# Patient Record
Sex: FEMALE | Race: WHITE | NOT HISPANIC OR LATINO | Employment: FULL TIME | URBAN - METROPOLITAN AREA
[De-identification: names, ages, dates, MRNs, and addresses within clinical notes are randomized per-mention and may not be internally consistent; named-entity substitution may affect disease eponyms.]

---

## 2017-01-03 ENCOUNTER — MYC MEDICAL ADVICE (OUTPATIENT)
Dept: ONCOLOGY | Facility: CLINIC | Age: 42
End: 2017-01-03

## 2017-01-05 DIAGNOSIS — Z01.818 PRE-OPERATIVE EXAMINATION: ICD-10-CM

## 2017-01-05 DIAGNOSIS — Z15.01 BRCA2 GENETIC CARRIER: Primary | ICD-10-CM

## 2017-01-05 DIAGNOSIS — Z15.09 BRCA2 GENETIC CARRIER: Primary | ICD-10-CM

## 2017-01-05 DIAGNOSIS — N39.3 FEMALE STRESS INCONTINENCE: Primary | ICD-10-CM

## 2017-01-05 RX ORDER — CEFAZOLIN SODIUM 1 G/50ML
2 SOLUTION INTRAVENOUS
Status: CANCELLED | OUTPATIENT
Start: 2017-01-05

## 2017-01-05 RX ORDER — PHENAZOPYRIDINE HYDROCHLORIDE 200 MG/1
200 TABLET, FILM COATED ORAL ONCE
Status: CANCELLED | OUTPATIENT
Start: 2017-01-05 | End: 2017-01-05

## 2017-01-05 RX ORDER — CEFAZOLIN SODIUM 1 G/3ML
1 INJECTION, POWDER, FOR SOLUTION INTRAMUSCULAR; INTRAVENOUS SEE ADMIN INSTRUCTIONS
Status: CANCELLED | OUTPATIENT
Start: 2017-01-05

## 2017-01-13 DIAGNOSIS — Z15.01 BRCA POSITIVE: Primary | ICD-10-CM

## 2017-01-13 DIAGNOSIS — Z15.09 BRCA POSITIVE: Primary | ICD-10-CM

## 2017-01-13 RX ORDER — PHENAZOPYRIDINE HYDROCHLORIDE 200 MG/1
200 TABLET, FILM COATED ORAL ONCE
Status: CANCELLED | OUTPATIENT
Start: 2017-01-13 | End: 2017-01-13

## 2017-02-28 RX ORDER — PHENAZOPYRIDINE HYDROCHLORIDE 200 MG/1
200 TABLET, FILM COATED ORAL ONCE
Status: CANCELLED | OUTPATIENT
Start: 2017-02-28 | End: 2017-02-28

## 2017-03-09 DIAGNOSIS — Z12.39 BREAST CANCER SCREENING, HIGH RISK PATIENT: Primary | ICD-10-CM

## 2017-03-09 DIAGNOSIS — Z15.01 BRCA2 POSITIVE: ICD-10-CM

## 2017-03-09 DIAGNOSIS — Z80.0 FAMILY HISTORY OF PANCREATIC CANCER: ICD-10-CM

## 2017-03-09 DIAGNOSIS — Z80.41 FAMILY HISTORY OF MALIGNANT NEOPLASM OF OVARY: ICD-10-CM

## 2017-03-09 DIAGNOSIS — Z80.3 FAMILY HISTORY OF MALIGNANT NEOPLASM OF BREAST: ICD-10-CM

## 2017-03-09 DIAGNOSIS — Z15.09 BRCA2 POSITIVE: ICD-10-CM

## 2017-03-10 ENCOUNTER — ONCOLOGY VISIT (OUTPATIENT)
Dept: ONCOLOGY | Facility: CLINIC | Age: 42
End: 2017-03-10
Attending: CLINICAL NURSE SPECIALIST
Payer: COMMERCIAL

## 2017-03-10 VITALS
OXYGEN SATURATION: 99 % | HEIGHT: 63 IN | DIASTOLIC BLOOD PRESSURE: 82 MMHG | SYSTOLIC BLOOD PRESSURE: 122 MMHG | TEMPERATURE: 98.6 F | WEIGHT: 136.9 LBS | BODY MASS INDEX: 24.26 KG/M2 | HEART RATE: 70 BPM

## 2017-03-10 DIAGNOSIS — Z15.01 BRCA2 POSITIVE: ICD-10-CM

## 2017-03-10 DIAGNOSIS — Z15.09 BRCA2 POSITIVE: ICD-10-CM

## 2017-03-10 DIAGNOSIS — Z15.01 BRCA2 POSITIVE: Primary | ICD-10-CM

## 2017-03-10 DIAGNOSIS — Z80.3 FAMILY HISTORY OF MALIGNANT NEOPLASM OF BREAST: ICD-10-CM

## 2017-03-10 DIAGNOSIS — N83.201 CYST OF RIGHT OVARY: ICD-10-CM

## 2017-03-10 DIAGNOSIS — Z15.09 BRCA2 POSITIVE: Primary | ICD-10-CM

## 2017-03-10 DIAGNOSIS — Z80.41 FAMILY HISTORY OF MALIGNANT NEOPLASM OF OVARY: ICD-10-CM

## 2017-03-10 LAB — CANCER AG125 SERPL-ACNC: 9 U/ML (ref 0–30)

## 2017-03-10 PROCEDURE — 86304 IMMUNOASSAY TUMOR CA 125: CPT | Performed by: CLINICAL NURSE SPECIALIST

## 2017-03-10 PROCEDURE — 36415 COLL VENOUS BLD VENIPUNCTURE: CPT | Performed by: CLINICAL NURSE SPECIALIST

## 2017-03-10 PROCEDURE — 99212 OFFICE O/P EST SF 10 MIN: CPT | Mod: ZF

## 2017-03-10 PROCEDURE — 99214 OFFICE O/P EST MOD 30 MIN: CPT | Mod: ZP | Performed by: CLINICAL NURSE SPECIALIST

## 2017-03-10 NOTE — NURSING NOTE
"Debbie Baker is a 41 year old female who presents for:  Chief Complaint   Patient presents with     Oncology Clinic Visit     BRCA2 Positive        Initial Vitals:  /82  Pulse 70  Temp 98.6  F (37  C) (Oral)  Ht 1.6 m (5' 3\")  Wt 62.1 kg (136 lb 14.4 oz)  SpO2 99%  BMI 24.25 kg/m2 Estimated body mass index is 24.25 kg/(m^2) as calculated from the following:    Height as of this encounter: 1.6 m (5' 3\").    Weight as of this encounter: 62.1 kg (136 lb 14.4 oz).. Body surface area is 1.66 meters squared. BP completed using cuff size: regular  Data Unavailable No LMP recorded. Allergies and medications reviewed.     Medications: Medication refills not needed today.  Pharmacy name entered into [employer]: [id number] - [place], [place] - [address] [place] [address] RD [address] AT [facility] [address]    Comments:     7 minutes for nursing intake (face to face time)   Candie Joy CMA        "

## 2017-03-10 NOTE — LETTER
Cancer Risk Management  Program Locations    [facility]  [facility]  [facility]  [facility]  [facility]  Mailing Address  Cancer Risk Management Program  [facility]  [address]  [facility] [address]  [place] [address]    New patient appointments  [phone]  March 10, 2017    Debbie Baker  [address]  [facility] [address]      Dear Debbie,    It was a pleasure meeting with you today.  Below is a copy of my note from our visit, outlining your surveillance plan.      I look forward to seeing you in the future to coordinate your care and reduce your health risks. Please feel free to contact me if you have any questions or concerns.        Oncology Risk Management Consultation:  Date on this visit: 3/10/2017    Debbie Baker returns to the Cancer Risk Management Program today for high risk  screening and surveillance to minimize her risk of cancer secondary to having a deleterious BRCA2 mutation.  She is considered to be at high risk for hereditary breast and ovarian cancer.    Primary Physician: Aspen Renteria     History Of Present Illness:  Ms. Baker is a 41 year old female who presents with family history of breast and ovarian cancer.    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      Pertinent history:  Menarche at age 11  First child at age 30  6-8 month history of breast feeding  Currently using the Mirena IUD (inserted in 2014)  LMP: unknown  Reports a 15-20 year history of oral contraceptive use  Her only past history with breast problems was an episode of mastitis when she was breastfeeding her children.     Breast screening history:  11/17/2014, Screening, mammogram, BiRads1.   3/11/2016 - Screening mammogram, BiRads1  9/9/2016 - Breast MRI, BiRads1.  No history  of breast biopsy    Ovarian screening history:  3/11/2016 - Pelvic US normal; :10 (normal)  2016 - Pelvic US normal; : 8 (normal)  3/10/2017 - R ovarian cyst - see details below;  pending     At this visit, she states that she feels well and denies nipple discharge, lumps, masses, skin and nipple changes and changes in symmetry. She denies fatigue, constipation, diarrhea, urinary frequency and urgency, abdominal bloating and early satiety    Past Medical/Surgical History:  Past Medical History   Diagnosis Date     At high risk for breast cancer 2016     BRCA2+     At risk for cancer 2016     at risk for ovarian/fallopian tube cancer, BRCA2+     BRCA2 positive 2016     [id number]; single site analysis through [facility]     Mastitis in female      No past surgical history on file.    Allergies:  Allergies as of 03/10/2017 - Avinash as Reviewed 03/10/2017   Allergen Reaction Noted     Dust mites Other (See Comments) 2016     No known drug allergies  2016       Current Medications:  Current Outpatient Prescriptions   Medication Sig Dispense Refill     Multiple Vitamin (M.V.I. ADULT IV) Take 1 tablet by mouth       tretinoin (RETIN-A) 0.05 % cream        zolpidem (AMBIEN) 5 MG tablet Take 5 mg by mouth       buPROPion (WELLBUTRIN) 100 MG tablet        fluticasone (FLONASE) 50 MCG/ACT nasal spray        levonorgestrel (MIRENA) 20 MCG/24HR IUD        Cholecalciferol (VITAMIN D) 2000 UNITS tablet        ibuprofen (ADVIL,MOTRIN) 800 MG tablet Reported on 3/10/2017          Family History:  Family History   Problem Relation Age of Onset     Hereditary Breast and Ovarian Cancer Syndrome Mother      BRCA2+ (same genetic mutation), no cancer     Ovarian Cancer Maternal Grandmother 59      at 61     Pancreatic Cancer Maternal Grandfather 87      at 87     Skin Cancer Paternal Grandmother 88     Bladder Cancer Paternal Grandfather 60      at 80     Hereditary  "Breast and Ovarian Cancer Syndrome Maternal Uncle      BRCA2+, no cancer     Breast Cancer Maternal Aunt 51     BRCA2+     Hereditary Breast and Ovarian Cancer Syndrome Cousin      BRCA2+       Social History:  Social History     Social History     Marital status:      Spouse name: Tavares     Number of children: 2     Years of education: N/A     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[employer]     Social History Main Topics     Smoking status: Never Smoker     Smokeless tobacco: Not on file     Alcohol use 0.0 oz/week      Comment: 1 drink/week     Drug use: No     Sexual activity: Yes     Partners: Male     Birth control/ protection: IUD      Comment: Mirena     Other Topics Concern     Caffeine Concern No     Occupational Exposure No     Hobby Hazards No     Sleep Concern No     Stress Concern Yes     Weight Concern No     Special Diet No     Back Care No     Exercise Yes     works out with , does yoga 3-4x/week     Seat Belt Yes     Self-Exams Yes     Parent/Sibling W/ Cabg, Mi Or Angioplasty Before 65f 55m? No     Social History Narrative       Physical Exam:  /82  Pulse 70  Temp 98.6  F (37  C) (Oral)  Ht 1.6 m (5' 3\")  Wt 62.1 kg (136 lb 14.4 oz)  SpO2 99%  BMI 24.25 kg/m2    GENERAL APPEARANCE: healthy, alert and no distress     HENT: Mouth without ulcers or lesions     NECK: no adenopathy, no asymmetry or masses     LYMPHATICS: No cervical, supraclavicular or axillary lymphadenopathy     RESP: lungs clear to auscultation - no rales, rhonchi or wheezes     CARDIOVASCULAR: regular rate and rhythm, normal S1 S2, no S3 or S4 and no murmur.   BREAST: A multi positional, bilateral breast exam was performed.  Fairly symmetrical -Rss>L. Right breast: no palpable dominant masses, no nipple discharge, no skin changes. Dense tissue.  Right axilla: no palpable adenopathy. Left breast: no palpable dominant masses, no nipple discharge, no skin changes. Left axilla: no palpable " "adenopathy. Dense tissue.        SKIN: no suspicious lesions or rashes    Laboratory/Imaging Studies  Results for orders placed or performed in visit on 03/10/17   US Pelvic Complete with Transvaginal    Narrative    EXAMINATION: Pelvic ultrasound, 3/10/2017 10:26 AM     COMPARISON: Ultrasound 9/9/2016    HISTORY: BRCA2 positive; high-risk for ovarian cancer    TECHNIQUE: The pelvis was scanned in standard fashion with  transabdominal and transvaginal transducer(s) using both grey scale  and color Doppler techniques.    FINDINGS:  The uterus measures 8.7 x 4.9 x 3.9 cm, and there is no evidence of a  focal fibroid.  The endometrium is within normal limits and measures 3  mm. Appropriately positioned IUD. There is no free fluid in the  pelvis.    The right ovary measures 3.1 x 3.5 x 2.2 cm and the left ovary  measures 3.2 x 3.1 x 1.7 cm. In the right ovary, there is a 1.7 x 1.7  x 1.1 cm cyst with heterogeneous avascular internal debris and  peripheral vascularity (aka \"ring of fire\"). There is no adnexal mass.  There is normal blood flow to the ovaries.        Impression    IMPRESSION:   1. Mildly complex cyst in the right ovary, appearance favors corpus  luteum cyst. Otherwise normal pelvic ultrasound.  Recommend short term  follow up ultrasound in 6-8 weeks to assess for stability or  resolution.  2. Appropriately positioned IUD.    I have personally reviewed the examination and initial interpretation  and I agree with the findings.    CARO IGLESIAS MD       ASSESSMENT  We reviewed results of Debbie's pelvis US today, which  revealed a mildly complex cyst of the R ovary, likely a corpus luteum cyst (see above).  I am ordering a follow up transvaginal ultrasound for her in 6 weeks.    At this visit, we also discussed the timing of her prophylactic salpingo oophorectomy.  NCCN makes note that BRCA2 genetic carriers may be able to wait until about age 45 to have a prophylactic oophorectomy.  We discussed that " often family history is taken into consideration when making these decisions.  At this point, Debbie says she has more concerns about the long term quality of life issues that may present if she has an oophorectomy earlier in life (cardiac issues, osteoporosis, dementia, etc.)  She says she feels comfortable waiting a bit longer to have an oophorectomy and intends to do so when she is closer to 44.    Her health practices are very good - her BMI is 24.2; she exercises regularly, manages her stress and her weight and says she feels very well. I encourage her to keep up her health promotion practices.     We discussed signs and symptoms to be watchful for in between screening visits. She verbalized understanding of signs and symptoms to address with her physicians such as unusual discharge, vaginal bleeding, unresolved bloating, pain, tenderness, early satiety and lumps, thickening, swelling, tenderness, nipple discharge or changes in skin of breasts.    INDIVIDUALIZED CANCER RISK MANAGEMENT PLAN:  Individualized Surveillance Plan for women  Hereditary Breast and/or Ovarian Cancer Syndrome   Per NCCN Guidelines Version 2.2017   Recommended screening Test or procedure Last done Next Scheduled    Breast self awareness- starting at age 18. Women should be familiar with their breasts and promptly report changes to their care provider. Periodic, consistent self exam may facilitate breast self awareness.    3/10/2017   Sept. 2017   Breast screening, starting at age 25 Clinical breast exams every 6 -12 months 3/10/2017 Sept. 2017   Breast screening   Age 25-29 Annual breast MRI screening with contrast (preferred) or mammogram if MRI is unavailable or individualized based on family history if a breast cancer diagnosis before age 30 is present.       Breast MRI is performed preferably on day 7-15 of menstrual cycle for premenopausal women.   NA   NA   Breast screening   Age >30-75 years     Annual mammogram and   annual breast  MRI, preferably on day 7-15 of menstrual cycle for premenopausal women.    Age>75 years, management should be considered on an individual basis. 3/11/2016 - [facility] mammogram, BiRads1    9/9/2016 - MRI, BiRads1 NEXT: [facility] Mammogram 3/10/2017 at 1 p.m.    NEXT MRI: 9/10 or later following exam   Ovarian cancer screening, starting at age 30-35 Consider transvaginal ultrasound and  tests. 8/9/2016 - Pelvic US, normal    8/9/2016 -  Pelvic US and   3/10/2017, then Sept. 2017   Recommendation: risk-reducing salpingo-oophorectomy, typically between 35 and 40 years old and  upon completion of child bearing.     Because ovarian cancer onset in patients with BRCA2 mutations is on average of 8-10 years later than in patients with BRCA1 mutations, it is reasonable to delay risk-reducing salpingo-oophorectomy until 40-45 years in patients with BRCA2 mutations who have already maximized their breast cancer prevention (i.e., undergone bilateral mastectomy).    Salpingectomy alone is not the standard of care for risk reduction although clinical trials are ongoing.     Discuss option of risk-reducing mastectomy.    Consider risks and benefits of risk reducing agents, such as tamoxifen and raloxifene for breast and ovarian cancer.    Consider a full body skin and eye exam for melanoma screening for both BRCA1+ and BRCA2+.     NOTE: Women with BRCA mutation who are treated for breast cancer should have screening of the remaining breast tissue with annual mammography and breast MRI.    The International Cancer of the Pancreas Screening (CAPS) Consortium recommends that individuals with a BRCA2 mutation who have at least one first-degree relative with pancreatic cancer should undergo initial screening with endoscopic ultrasonography and/or MRI/magnetic resonance cholangiopancreatography.       I will follow up on her screenings and see her in the Cancer Risk Management clinic in six months.    I spent 25 minutes with the  patient with greater that 50% of it in counseling and coordinating care as documented above.    Meliza Najera, HENRIQUE-CNS, OCN, ANG-BC  Clinical Nurse Specialist  Cancer Risk Management Program  [address]  [place] [address]    phone:  [phone]  Pager: [phone]  fax: [phone]    Cc:  Aspen Renteria MD

## 2017-03-10 NOTE — PROGRESS NOTES
Oncology Risk Management Consultation:  Date on this visit: 3/10/2017    Debbie Baker returns to the Cancer Risk Management Program today for high risk  screening and surveillance to minimize her risk of cancer secondary to having a deleterious BRCA2 mutation.  She is considered to be at high risk for hereditary breast and ovarian cancer.    Primary Physician: Aspen Renteria     History Of Present Illness:  Ms. Baker is a 41 year old female who presents with family history of breast and ovarian cancer.    Genetic testing:  BRCA2 mutation, specifically [id number], found on single site analysis genetic testing through [facility] on 1/12/16.      Pertinent history:  Menarche at age 11  First child at age 30  6-8 month history of breast feeding  Currently using the Mirena IUD (inserted in 2014)  LMP: unknown  Reports a 15-20 year history of oral contraceptive use  Her only past history with breast problems was an episode of mastitis when she was breastfeeding her children.     Breast screening history:  11/17/2014, Screening, mammogram, BiRads1.   3/11/2016 - Screening mammogram, BiRads1  9/9/2016 - Breast MRI, BiRads1.  No history of breast biopsy    Ovarian screening history:  3/11/2016 - Pelvic US normal; :10 (normal)  9/9/2016 - Pelvic US normal; : 8 (normal)  3/10/2017 - R ovarian cyst - see details below;  pending     At this visit, she states that she feels well and denies nipple discharge, lumps, masses, skin and nipple changes and changes in symmetry. She denies fatigue, constipation, diarrhea, urinary frequency and urgency, abdominal bloating and early satiety    Past Medical/Surgical History:  Past Medical History   Diagnosis Date     At high risk for breast cancer 01/12/2016     BRCA2+     At risk for cancer 01/12/2016     at risk for ovarian/fallopian tube cancer, BRCA2+     BRCA2 positive 01/12/2016     [id number]; single site analysis through [facility]     Mastitis in  female      No past surgical history on file.    Allergies:  Allergies as of 03/10/2017 - Avinash as Reviewed 03/10/2017   Allergen Reaction Noted     Dust mites Other (See Comments) 2016     No known drug allergies  2016       Current Medications:  Current Outpatient Prescriptions   Medication Sig Dispense Refill     Multiple Vitamin (M.V.I. ADULT IV) Take 1 tablet by mouth       tretinoin (RETIN-A) 0.05 % cream        zolpidem (AMBIEN) 5 MG tablet Take 5 mg by mouth       buPROPion (WELLBUTRIN) 100 MG tablet        fluticasone (FLONASE) 50 MCG/ACT nasal spray        levonorgestrel (MIRENA) 20 MCG/24HR IUD        Cholecalciferol (VITAMIN D) 2000 UNITS tablet        ibuprofen (ADVIL,MOTRIN) 800 MG tablet Reported on 3/10/2017          Family History:  Family History   Problem Relation Age of Onset     Hereditary Breast and Ovarian Cancer Syndrome Mother      BRCA2+ (same genetic mutation), no cancer     Ovarian Cancer Maternal Grandmother 59      at 61     Pancreatic Cancer Maternal Grandfather 87      at 87     Skin Cancer Paternal Grandmother 88     Bladder Cancer Paternal Grandfather 60      at 80     Hereditary Breast and Ovarian Cancer Syndrome Maternal Uncle      BRCA2+, no cancer     Breast Cancer Maternal Aunt 51     BRCA2+     Hereditary Breast and Ovarian Cancer Syndrome Cousin      BRCA2+       Social History:  Social History     Social History     Marital status:      Spouse name: Tavares     Number of children: 2     Years of education: N/A     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[employer]     Social History Main Topics     Smoking status: Never Smoker     Smokeless tobacco: Not on file     Alcohol use 0.0 oz/week      Comment: 1 drink/week     Drug use: No     Sexual activity: Yes     Partners: Male     Birth control/ protection: IUD      Comment: Mirena     Other Topics Concern     Caffeine Concern No     Occupational Exposure No     Hobby Hazards No      "Sleep Concern No     Stress Concern Yes     Weight Concern No     Special Diet No     Back Care No     Exercise Yes     works out with , does yoga 3-4x/week     Seat Belt Yes     Self-Exams Yes     Parent/Sibling W/ Cabg, Mi Or Angioplasty Before 65f 55m? No     Social History Narrative       Physical Exam:  /82  Pulse 70  Temp 98.6  F (37  C) (Oral)  Ht 1.6 m (5' 3\")  Wt 62.1 kg (136 lb 14.4 oz)  SpO2 99%  BMI 24.25 kg/m2    GENERAL APPEARANCE: healthy, alert and no distress     HENT: Mouth without ulcers or lesions     NECK: no adenopathy, no asymmetry or masses     LYMPHATICS: No cervical, supraclavicular or axillary lymphadenopathy     RESP: lungs clear to auscultation - no rales, rhonchi or wheezes     CARDIOVASCULAR: regular rate and rhythm, normal S1 S2, no S3 or S4 and no murmur.   BREAST: A multi positional, bilateral breast exam was performed.  Fairly symmetrical -Rss>L. Right breast: no palpable dominant masses, no nipple discharge, no skin changes. Dense tissue.  Right axilla: no palpable adenopathy. Left breast: no palpable dominant masses, no nipple discharge, no skin changes. Left axilla: no palpable adenopathy. Dense tissue.        SKIN: no suspicious lesions or rashes    Laboratory/Imaging Studies  Results for orders placed or performed in visit on 03/10/17   US Pelvic Complete with Transvaginal    Narrative    EXAMINATION: Pelvic ultrasound, 3/10/2017 10:26 AM     COMPARISON: Ultrasound 9/9/2016    HISTORY: BRCA2 positive; high-risk for ovarian cancer    TECHNIQUE: The pelvis was scanned in standard fashion with  transabdominal and transvaginal transducer(s) using both grey scale  and color Doppler techniques.    FINDINGS:  The uterus measures 8.7 x 4.9 x 3.9 cm, and there is no evidence of a  focal fibroid.  The endometrium is within normal limits and measures 3  mm. Appropriately positioned IUD. There is no free fluid in the  pelvis.    The right ovary measures 3.1 x 3.5 x " "2.2 cm and the left ovary  measures 3.2 x 3.1 x 1.7 cm. In the right ovary, there is a 1.7 x 1.7  x 1.1 cm cyst with heterogeneous avascular internal debris and  peripheral vascularity (aka \"ring of fire\"). There is no adnexal mass.  There is normal blood flow to the ovaries.        Impression    IMPRESSION:   1. Mildly complex cyst in the right ovary, appearance favors corpus  luteum cyst. Otherwise normal pelvic ultrasound.  Recommend short term  follow up ultrasound in 6-8 weeks to assess for stability or  resolution.  2. Appropriately positioned IUD.    I have personally reviewed the examination and initial interpretation  and I agree with the findings.    CARO IGLESIAS MD       ASSESSMENT  We reviewed results of Debbie's pelvis US today, which  revealed a mildly complex cyst of the R ovary, likely a corpus luteum cyst (see above).  I am ordering a follow up transvaginal ultrasound for her in 6 weeks.    At this visit, we also discussed the timing of her prophylactic salpingo oophorectomy.  NCCN makes note that BRCA2 genetic carriers may be able to wait until about age 45 to have a prophylactic oophorectomy.  We discussed that often family history is taken into consideration when making these decisions.  At this point, Debbie says she has more concerns about the long term quality of life issues that may present if she has an oophorectomy earlier in life (cardiac issues, osteoporosis, dementia, etc.)  She says she feels comfortable waiting a bit longer to have an oophorectomy and intends to do so when she is closer to 44.    Her health practices are very good - her BMI is 24.2; she exercises regularly, manages her stress and her weight and says she feels very well. I encourage her to keep up her health promotion practices.     We discussed signs and symptoms to be watchful for in between screening visits. She verbalized understanding of signs and symptoms to address with her physicians such as unusual discharge, " vaginal bleeding, unresolved bloating, pain, tenderness, early satiety and lumps, thickening, swelling, tenderness, nipple discharge or changes in skin of breasts.    INDIVIDUALIZED CANCER RISK MANAGEMENT PLAN:  Individualized Surveillance Plan for women  Hereditary Breast and/or Ovarian Cancer Syndrome   Per NCCN Guidelines Version 2.2017   Recommended screening Test or procedure Last done Next Scheduled    Breast self awareness- starting at age 18. Women should be familiar with their breasts and promptly report changes to their care provider. Periodic, consistent self exam may facilitate breast self awareness.    3/10/2017   Sept. 2017   Breast screening, starting at age 25 Clinical breast exams every 6 -12 months 3/10/2017 Sept. 2017   Breast screening   Age 25-29 Annual breast MRI screening with contrast (preferred) or mammogram if MRI is unavailable or individualized based on family history if a breast cancer diagnosis before age 30 is present.       Breast MRI is performed preferably on day 7-15 of menstrual cycle for premenopausal women.   NA   NA   Breast screening   Age >30-75 years     Annual mammogram and   annual breast MRI, preferably on day 7-15 of menstrual cycle for premenopausal women.    Age>75 years, management should be considered on an individual basis. 3/11/2016 - [facility] mammogram, BiRads1    9/9/2016 - MRI, BiRads1 NEXT: [facility] Mammogram 3/10/2017 at 1 p.m.    NEXT MRI: 9/10 or later following exam   Ovarian cancer screening, starting at age 30-35 Consider transvaginal ultrasound and  tests. 8/9/2016 - Pelvic US, normal    8/9/2016 -  Pelvic US and   3/10/2017, then Sept. 2017   Recommendation: risk-reducing salpingo-oophorectomy, typically between 35 and 40 years old and  upon completion of child bearing.     Because ovarian cancer onset in patients with BRCA2 mutations is on average of 8-10 years later than in patients with BRCA1 mutations, it is reasonable to delay risk-reducing  salpingo-oophorectomy until 40-45 years in patients with BRCA2 mutations who have already maximized their breast cancer prevention (i.e., undergone bilateral mastectomy).    Salpingectomy alone is not the standard of care for risk reduction although clinical trials are ongoing.     Discuss option of risk-reducing mastectomy.    Consider risks and benefits of risk reducing agents, such as tamoxifen and raloxifene for breast and ovarian cancer.    Consider a full body skin and eye exam for melanoma screening for both BRCA1+ and BRCA2+.     NOTE: Women with BRCA mutation who are treated for breast cancer should have screening of the remaining breast tissue with annual mammography and breast MRI.    The International Cancer of the Pancreas Screening (CAPS) Consortium recommends that individuals with a BRCA2 mutation who have at least one first-degree relative with pancreatic cancer should undergo initial screening with endoscopic ultrasonography and/or MRI/magnetic resonance cholangiopancreatography.       I will follow up on her screenings and see her in the Cancer Risk Management clinic in six months.    I spent 25 minutes with the patient with greater that 50% of it in counseling and coordinating care as documented above.    Meliza Najera, HENRIQUE-CNS, OCN, ANG-BC  Clinical Nurse Specialist  Cancer Risk Management Program  [address]  [place] [address]    phone:  [phone]  Pager: [phone]  fax: [phone]    Cc:  Aspen Renteria MD

## 2017-03-10 NOTE — MR AVS SNAPSHOT
After Visit Summary   3/10/2017    Debbie Baker    MRN: [id number]           Patient Information     Date Of Birth          1975        Visit Information        Provider Department      3/10/2017 11:00 AM Meliza Najera APRN [facility]        Today's Diagnoses     BRCA2 positive    -  1    Family history of malignant neoplasm of breast        Cyst of right ovary          Care Instructions    Individualized Surveillance Plan for women  Hereditary Breast and/or Ovarian Cancer Syndrome   Per NCCN Guidelines Version 2.2017   Recommended screening Test or procedure Last done Next Scheduled    Breast self awareness- starting at age 18. Women should be familiar with their breasts and promptly report changes to their care provider. Periodic, consistent self exam may facilitate breast self awareness.    3/10/2017   Sept. 2017   Breast screening, starting at age 25 Clinical breast exams every 6 -12 months 3/10/2017 Sept. 2017   Breast screening   Age 25-29 Annual breast MRI screening with contrast (preferred) or mammogram if MRI is unavailable or individualized based on family history if a breast cancer diagnosis before age 30 is present.       Breast MRI is performed preferably on day 7-15 of menstrual cycle for premenopausal women.   NA   NA   Breast screening   Age >30-75 years     Annual mammogram and   annual breast MRI, preferably on day 7-15 of menstrual cycle for premenopausal women.    Age>75 years, management should be considered on an individual basis. 3/11/2016 - [facility] mammogram, BiRads1    9/9/2016 - MRI, BiRads1 NEXT: [facility] Mammogram 3/10/2017 at 1 p.m.    NEXT MRI: 9/10 or later following exam   Ovarian cancer screening, starting at age 30-35 Consider transvaginal ultrasound and  tests. 8/9/2016 - Pelvic US, normal    8/9/2016 -  Pelvic US and   3/10/2017, then Sept. 2017   Recommendation: risk-reducing salpingo-oophorectomy, typically between 35  and 40 years old and  upon completion of child bearing.     Because ovarian cancer onset in patients with BRCA2 mutations is on average of 8-10 years later than in patients with BRCA1 mutations, it is reasonable to delay risk-reducing salpingo-oophorectomy until 40-45 years in patients with BRCA2 mutations who have already maximized their breast cancer prevention (i.e., undergone bilateral mastectomy).    Salpingectomy alone is not the standard of care for risk reduction although clinical trials are ongoing.     Discuss option of risk-reducing mastectomy.    Consider risks and benefits of risk reducing agents, such as tamoxifen and raloxifene for breast and ovarian cancer.    Consider a full body skin and eye exam for melanoma screening for both BRCA1+ and BRCA2+.     NOTE: Women with BRCA mutation who are treated for breast cancer should have screening of the remaining breast tissue with annual mammography and breast MRI.    The International Cancer of the Pancreas Screening (CAPS) Consortium recommends that individuals with a BRCA2 mutation who have at least one first-degree relative with pancreatic cancer should undergo initial screening with endoscopic ultrasonography and/or MRI/magnetic resonance cholangiopancreatography.               Follow-ups after your visit        Follow-up notes from your care team     Return in about 6 months (around 9/10/2017) for Physical Exam.      Future tests that were ordered for you today     Open Future Orders        Priority Expected Expires Ordered    US Pelvic Complete with Transvaginal Routine 4/14/2017 3/10/2018 3/10/2017    MR Breast Bilateral w Contrast Routine 9/10/2017 3/10/2018 3/9/2017    US Transvaginal Non OB Routine 9/1/2017 3/10/2018 3/9/2017     Routine 9/1/2017 3/9/2018 3/9/2017            Who to contact     If you have questions or need follow up information about today's clinic visit or your schedule please contact [facility] directly  "at [phone].  Normal or non-critical lab and imaging results will be communicated to you by MyChart, letter or phone within 4 business days after the clinic has received the results. If you do not hear from us within 7 days, please contact the clinic through [employer] or phone. If you have a critical or abnormal lab result, we will notify you by phone as soon as possible.  Submit refill requests through [employer] or call your pharmacy and they will forward the refill request to us. Please allow 3 business days for your refill to be completed.          Additional Information About Your Visit        [employer] Information     [employer] gives you secure access to your electronic health record. If you see a primary care provider, you can also send messages to your care team and make appointments. If you have questions, please call your primary care clinic.  If you do not have a primary care provider, please call [phone] and they will assist you.        Care EveryWhere ID     This is your Care EveryWhere ID. This could be used by other organizations to access your [facility] medical records  [id number]        Your Vitals Were     Pulse Temperature Height Pulse Oximetry BMI (Body Mass Index)       70 98.6  F (37  C) (Oral) 1.6 m (5' 3\") 99% 24.25 kg/m2        Blood Pressure from Last 3 Encounters:   03/10/17 122/82   11/16/16 (!) 132/93   09/29/16 106/58    Weight from Last 3 Encounters:   03/10/17 62.1 kg (136 lb 14.4 oz)   11/16/16 63.5 kg (140 lb)   09/29/16 63.4 kg (139 lb 11.2 oz)               Primary Care Provider Office Phone # Fax #    Aspen Renteria [phone]       [facility] [address] [place] [address]        Thank you!     Thank you for choosing [facility]  for your care. Our goal is always to provide you with excellent care. Hearing back from our patients is one way we can continue to improve our services. Please take a few minutes to complete the " written survey that you may receive in the mail after your visit with us. Thank you!             Your Updated Medication List - Protect others around you: Learn how to safely use, store and throw away your medicines at www.disposemymeds.org.          This list is accurate as of: 3/10/17  2:09 PM.  Always use your most recent med list.                   Brand Name Dispense Instructions for use    buPROPion 100 MG tablet    WELLBUTRIN         fluticasone 50 MCG/ACT spray    FLONASE         ibuprofen 800 MG tablet    ADVIL/MOTRIN     Reported on 3/10/2017       levonorgestrel 20 MCG/24HR IUD    MIRENA         M.V.I. ADULT IV      Take 1 tablet by mouth       tretinoin 0.05 % cream    RETIN-A         vitamin D 2000 UNITS tablet          zolpidem 5 MG tablet    AMBIEN     Take 5 mg by mouth

## 2017-07-22 ENCOUNTER — HEALTH MAINTENANCE LETTER (OUTPATIENT)
Age: 42
End: 2017-07-22

## 2017-09-15 DIAGNOSIS — Z80.41 FAMILY HISTORY OF MALIGNANT NEOPLASM OF OVARY: ICD-10-CM

## 2017-09-15 DIAGNOSIS — Z15.01 BRCA2 POSITIVE: ICD-10-CM

## 2017-09-15 DIAGNOSIS — Z15.09 BRCA2 POSITIVE: ICD-10-CM

## 2017-09-15 DIAGNOSIS — R31.29 MICROHEMATURIA: ICD-10-CM

## 2017-09-15 LAB — CANCER AG125 SERPL-ACNC: 8 U/ML (ref 0–30)

## 2017-09-29 ENCOUNTER — ONCOLOGY VISIT (OUTPATIENT)
Dept: ONCOLOGY | Facility: CLINIC | Age: 42
End: 2017-09-29
Attending: CLINICAL NURSE SPECIALIST
Payer: COMMERCIAL

## 2017-09-29 VITALS
WEIGHT: 137.8 LBS | RESPIRATION RATE: 16 BRPM | DIASTOLIC BLOOD PRESSURE: 80 MMHG | TEMPERATURE: 99.3 F | SYSTOLIC BLOOD PRESSURE: 119 MMHG | HEIGHT: 63 IN | BODY MASS INDEX: 24.41 KG/M2 | HEART RATE: 71 BPM | OXYGEN SATURATION: 96 %

## 2017-09-29 DIAGNOSIS — N83.209 OVARIAN CYST: Primary | ICD-10-CM

## 2017-09-29 DIAGNOSIS — Z15.09 BRCA2 POSITIVE: ICD-10-CM

## 2017-09-29 DIAGNOSIS — Z91.89 AT RISK FOR CANCER: ICD-10-CM

## 2017-09-29 DIAGNOSIS — Z15.01 BRCA2 POSITIVE: ICD-10-CM

## 2017-09-29 DIAGNOSIS — Z80.41 FAMILY HISTORY OF MALIGNANT NEOPLASM OF OVARY: ICD-10-CM

## 2017-09-29 DIAGNOSIS — Z12.39 BREAST CANCER SCREENING, HIGH RISK PATIENT: ICD-10-CM

## 2017-09-29 DIAGNOSIS — Z12.39 ENCOUNTER FOR BREAST CANCER SCREENING OTHER THAN MAMMOGRAM: ICD-10-CM

## 2017-09-29 DIAGNOSIS — Z80.3 FAMILY HISTORY OF MALIGNANT NEOPLASM OF BREAST: ICD-10-CM

## 2017-09-29 PROCEDURE — 99214 OFFICE O/P EST MOD 30 MIN: CPT | Mod: ZP | Performed by: CLINICAL NURSE SPECIALIST

## 2017-09-29 PROCEDURE — 99212 OFFICE O/P EST SF 10 MIN: CPT | Mod: ZF

## 2017-09-29 ASSESSMENT — PAIN SCALES - GENERAL: PAINLEVEL: NO PAIN (0)

## 2017-09-29 NOTE — LETTER
Cancer Risk Management  Program Locations    [facility]  [facility]  [facility]  Mailing Address  Cancer Risk Management Program  [facility]  [address]  [facility] [address]  [place] [address]    New patient appointments  [phone]  2017    Debbie Baker  [address]  [facility] [address]      Dear Debbie,    It was good seeing you doing so well  today.  Below is a copy of my note from our visit, outlining your surveillance plan.  I look forward to following up with you on your screenings.  Please feel free to contact me if you have any questions or concerns.      Oncology Risk Management Consultation:  Date on this visit: 2017    Debbie Baker returns to the [facility] for a follow up oncology risk management consultation. She requires screening and surveillance to minimize her risk of cancer secondary to having a deleterious BRCA2 mutation.  She is considered to be at high risk for hereditary breast and ovarian cancer.    Primary Physician: Aspen Renteria MD    History Of Present Illness:  Ms. Baker is a very pleasant, healthy 42 year old female who presents with family history of breast and ovarian cancer     Genetic testin2016 - POSITIVE for a BRCA2 mutation, specifically [id number], found on single site analysis genetic testing through [employer]. The testing was done because of a known genetic mutation in the family.    Pertinent history:  Menarche at age 11  First child at age 30  Hx of breastfeeding x 6-8 month  Currently using the Mirena IUD (inserted in )  LMP: unknown  Reports a 15-20 year history of oral contraceptive use  Her only past history with breast problems was an episode of mastitis when she was breastfeeding her children  No history of breast biopsy, dysplasia, hyperplasia  or malignancy.    Screening history:  11/17/2014, Screening, mammogram, BiRads1.   3/11/2016 - Screening mammogram, BiRads1  9/9/2016 - Breast MRI, BiRads1.  3/10/2017 - Screening tomosynthesis mammogram, BiRads1.  9/15/2017 - Breast MRI, BiRads1.    Ovarian screening history:  3/11/2016 - Pelvic US normal; :10 (normal)  9/9/2016 - Pelvic US normal; : 8 (normal)  3/10/2017 - R ovarian cyst   4/13/2017 - Pelvic US, R hemorrhagic cyst resolved.   9/15/2017 - Pelvic US, Simple cyst on L ovary,  Measuring up to 4 cm.    At this visit, she states she feels well and denies fatigue, pain, nipple discharge, lumps masses, skin and nipple changes and changes in symmetry. She denies constipation, diarrhea, urinary symptoms, abdominal bloating and early satiety.    Past Medical/Surgical History:  Past Medical History:   Diagnosis Date     At high risk for breast cancer 01/12/2016    BRCA2+     At risk for cancer 01/12/2016    at risk for ovarian/fallopian tube cancer, BRCA2+     BRCA2 positive 01/12/2016    [id number]; single site analysis through [employer]     Mastitis in female 2010     No past surgical history on file.    Allergies:  Allergies as of 09/29/2017 - Avinash as Reviewed 03/10/2017   Allergen Reaction Noted     Dust mites Other (See Comments) 03/04/2016     No known drug allergies  09/09/2016       Current Medications:  Current Outpatient Prescriptions   Medication Sig Dispense Refill     ibuprofen (ADVIL,MOTRIN) 800 MG tablet Reported on 3/10/2017       Multiple Vitamin (M.V.I. ADULT IV) Take 1 tablet by mouth       tretinoin (RETIN-A) 0.05 % cream        zolpidem (AMBIEN) 5 MG tablet Take 5 mg by mouth       buPROPion (WELLBUTRIN) 100 MG tablet        fluticasone (FLONASE) 50 MCG/ACT nasal spray        levonorgestrel (MIRENA) 20 MCG/24HR IUD        Cholecalciferol (VITAMIN D) 2000 UNITS tablet           Family History:  Family History   Problem Relation Age of Onset     Hereditary Breast and  Ovarian Cancer Syndrome Mother      BRCA2+ (same genetic mutation), no cancer     Ovarian Cancer Maternal Grandmother 59      at 61     Pancreatic Cancer Maternal Grandfather 87      at 87     Skin Cancer Paternal Grandmother 88     Bladder Cancer Paternal Grandfather 60      at 80     Hereditary Breast and Ovarian Cancer Syndrome Maternal Uncle      BRCA2+, no cancer     Breast Cancer Maternal Aunt 51     BRCA2+     Hereditary Breast and Ovarian Cancer Syndrome Cousin      BRCA2+       Social History:  Social History     Social History     Marital status:      Spouse name: Tavares     Number of children: 2     Years of education: N/A     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[employer]     Social History Main Topics     Smoking status: Never Smoker     Smokeless tobacco: Not on file     Alcohol use 0.0 oz/week      Comment: 1 drink/week     Drug use: No     Sexual activity: Yes     Partners: Male     Birth control/ protection: IUD      Comment: Mirena     Other Topics Concern     Caffeine Concern No     Occupational Exposure No     Hobby Hazards No     Sleep Concern No     Stress Concern Yes     Weight Concern No     Special Diet No     Back Care No     Exercise Yes     works out with , does yoga 3-4x/week     Seat Belt Yes     Self-Exams Yes     Parent/Sibling W/ Cabg, Mi Or Angioplasty Before 65f 55m? No     Social History Narrative       Physical Exam:  There were no vitals taken for this visit.  Physical exam deferred.    Laboratory/Imaging Studies  Results for orders placed or performed in visit on 09/15/17   MR Breast Bilateral w Contrast    Narrative    Exam: Breast MRI, with and without Contrast, and with color encoding     History/Indication: High risk screening. BRCA2 mutation.    Comparison: 2016    Technique: Precontrast gradient recall axial nonfat sat T1.Precontrast  gradient recall axial fat-sat T1. Precontrast STIR axial fat  sat.Postcontrast  gradient recall axial fat-sat T1 with dynamic  postcontrast acquisitions at 2, 4 and 6 minutes with subtractions.  Delayed high-resolution gradient recall sagittal fat-sat T1. MIP of  subtractions, axial coronal and sagittal. Color encoding of post  contrast dynamic acquisitions. IV contrast: 6mL Gadavist    Breast composition: Scattered fibroglandular tissue    Background parenchymal enhancement 1st post C image: Minimal    Findings: No suspicious enhancement in either breast. No  lymphadenopathy.      Impression    Impression: BI-RADS CATEGORY: 1 -  NEGATIVE.    Recommendation: Annual breast cancer screening.    I have personally reviewed the examination and initial interpretation  and I agree with the findings.    FLORI LUA MD       ASSESSMENT  Because of the larger cyst on her L ovary, I am ordering Debbie a follow up US in mid to late October to reimage.    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  She is managing her stress well and continues to exercise, not smoke, maintain a healthy weight and limit alcohol.     We revisited the estimations of her risk, according to Gloria et al 2007, her risk for breast cancer is about 8% right now and may rise to 25.79% by 59, ultimately at 49.86% by 77 years old. It does decrease with a RRSO, but some estimates show only a 10-20% reduction, while others estimate it to be up to 50%. She recognizes that risk estimations do not take into account health promotion practices or individual phenotypes correlated with specific genetic mutations within BRCA2. At this point, she is planning to review her risk in 2 years and consider when to have an RRSO.      INDIVIDUALIZED CANCER RISK MANAGEMENT PLAN:  Individualized Surveillance Plan for women  Hereditary Breast and/or Ovarian Cancer Syndrome   Per NCCN Guidelines Version 2.2017   Recommended screening Test or procedure Last done Next Scheduled     Breast self awareness- starting at age 18. Women should be familiar with their breasts and promptly report changes to their care provider. Periodic, consistent self exam may facilitate breast self awareness.    9/29/2017 March 2018   Breast screening, starting at age 25 Clinical breast exams every 6 -12 months 3/10/ 2017 March 2018   Breast screening   Age 25-29 Annual breast MRI screening with contrast (preferred) or mammogram if MRI is unavailable or individualized based on family history if a breast cancer diagnosis before age 30 is present.       Breast MRI is performed preferably on day 7-15 of menstrual cycle for premenopausal women.     NA     NA   Breast screening   Age >30-75 years     Annual mammogram and   annual breast MRI, preferably on day 7-15 of menstrual cycle for premenopausal women.    Age>75 years, management should be considered on an individual basis. 3/10/2017 - Screening 3D mammogram, BiRads1    9/15/2017 - Breast MRI, BiRads1 NEXT: Exam in March followed by tomosynthesis mammogram    Next MRI: Sept. 2018   Ovarian cancer screening, starting at age 30-35 Consider transvaginal ultrasound and  tests. 9/15/2017 - Pelvic US, simple L ovarian cyst, 4 cm    9/15/2017 -  (8) normal Next: Follow up pelvic US in mid to late Oct.     Next lab: March 2018   Recommendation: risk-reducing salpingo-oophorectomy, typically between 35 and 40 years old and  upon completion of child bearing.     Because ovarian cancer onset in patients with BRCA2 mutations is on average of 8-10 years later than in patients with BRCA1 mutations, it is reasonable to delay risk-reducing salpingo-oophorectomy until 40-45 years in patients with BRCA2 mutations who have already maximized their breast cancer prevention (i.e., undergone bilateral mastectomy).    Salpingectomy alone is not the standard of care for risk reduction although clinical trials are ongoing.     Discuss option of risk-reducing  mastectomy.    Consider risks and benefits of risk reducing agents, such as tamoxifen and raloxifene for breast and ovarian cancer.    Consider a full body skin and eye exam for melanoma screening for both BRCA1+ and BRCA2+.     NOTE: Women with BRCA mutation who are treated for breast cancer should have screening of the remaining breast tissue with annual mammography and breast MRI.    The International Cancer of the Pancreas Screening (CAPS) Consortium recommends that individuals with a BRCA2 mutation who have at least one first-degree relative with pancreatic cancer should undergo initial screening with endoscopic ultrasonography and/or MRI/magnetic resonance cholangiopancreatography.     I will follow up on her screenings and see her in the Cancer Risk Management clinic in six months.    I spent 27 minutes with the patient with greater that 50% of it in counseling and coordinating care as documented above.    Meliza Najera, HENRIQUE-CNS, OCN, ANG-BC  Clinical Nurse Specialist  Cancer Risk Management Program  [address]  [place] [address]    phone:  [phone]  Pager: [phone]  fax: [phone]    Cc: Aspen Renteria MD

## 2017-09-29 NOTE — PATIENT INSTRUCTIONS
Individualized Surveillance Plan for women  Hereditary Breast and/or Ovarian Cancer Syndrome   Per NCCN Guidelines Version 2.2017   Recommended screening Test or procedure Last done Next Scheduled    Breast self awareness- starting at age 18. Women should be familiar with their breasts and promptly report changes to their care provider. Periodic, consistent self exam may facilitate breast self awareness.    9/29/2017 March 2018   Breast screening, starting at age 25 Clinical breast exams every 6 -12 months 3/10/ 2017 March 2018   Breast screening   Age 25-29 Annual breast MRI screening with contrast (preferred) or mammogram if MRI is unavailable or individualized based on family history if a breast cancer diagnosis before age 30 is present.       Breast MRI is performed preferably on day 7-15 of menstrual cycle for premenopausal women.     NA     NA   Breast screening   Age >30-75 years     Annual mammogram and   annual breast MRI, preferably on day 7-15 of menstrual cycle for premenopausal women.    Age>75 years, management should be considered on an individual basis. 3/10/2017 - Screening 3D mammogram, BiRads1    9/15/2017 - Breast MRI, BiRads1 NEXT: Exam in March followed by tomosynthesis mammogram    Next MRI: Sept. 2018   Ovarian cancer screening, starting at age 30-35 Consider transvaginal ultrasound and  tests. 9/15/2017 - Pelvic US, simple L ovarian cyst, 4 cm    9/15/2017 -  (8) normal Next: Follow up pelvic US in mid to late Oct.     Next lab: March 2018   Recommendation: risk-reducing salpingo-oophorectomy, typically between 35 and 40 years old and  upon completion of child bearing.     Because ovarian cancer onset in patients with BRCA2 mutations is on average of 8-10 years later than in patients with BRCA1 mutations, it is reasonable to delay risk-reducing salpingo-oophorectomy until 40-45 years in patients with BRCA2 mutations who have already maximized their breast cancer prevention (i.e.,  undergone bilateral mastectomy).    Salpingectomy alone is not the standard of care for risk reduction although clinical trials are ongoing.     Discuss option of risk-reducing mastectomy.    Consider risks and benefits of risk reducing agents, such as tamoxifen and raloxifene for breast and ovarian cancer.    Consider a full body skin and eye exam for melanoma screening for both BRCA1+ and BRCA2+.     NOTE: Women with BRCA mutation who are treated for breast cancer should have screening of the remaining breast tissue with annual mammography and breast MRI.    The International Cancer of the Pancreas Screening (CAPS) Consortium recommends that individuals with a BRCA2 mutation who have at least one first-degree relative with pancreatic cancer should undergo initial screening with endoscopic ultrasonography and/or MRI/magnetic resonance cholangiopancreatography.

## 2017-09-29 NOTE — MR AVS SNAPSHOT
After Visit Summary   9/29/2017    Debbie Baker    MRN: [id number]           Patient Information     Date Of Birth          1975        Visit Information        Provider Department      9/29/2017 8:30 AM Meliza Najera APRN [facility]        Today's Diagnoses     Ovarian cyst    -  1    At risk for cancer        BRCA2 positive        Family history of malignant neoplasm of ovary        Breast cancer screening, high risk patient        Family history of malignant neoplasm of breast        Encounter for breast cancer screening other than mammogram          Care Instructions    Individualized Surveillance Plan for women  Hereditary Breast and/or Ovarian Cancer Syndrome   Per NCCN Guidelines Version 2.2017   Recommended screening Test or procedure Last done Next Scheduled    Breast self awareness- starting at age 18. Women should be familiar with their breasts and promptly report changes to their care provider. Periodic, consistent self exam may facilitate breast self awareness.    9/29/2017 March 2018   Breast screening, starting at age 25 Clinical breast exams every 6 -12 months 3/10/ 2017 March 2018   Breast screening   Age 25-29 Annual breast MRI screening with contrast (preferred) or mammogram if MRI is unavailable or individualized based on family history if a breast cancer diagnosis before age 30 is present.       Breast MRI is performed preferably on day 7-15 of menstrual cycle for premenopausal women.     NA     NA   Breast screening   Age >30-75 years     Annual mammogram and   annual breast MRI, preferably on day 7-15 of menstrual cycle for premenopausal women.    Age>75 years, management should be considered on an individual basis. 3/10/2017 - Screening 3D mammogram, BiRads1    9/15/2017 - Breast MRI, BiRads1 NEXT: Exam in March followed by tomosynthesis mammogram    Next MRI: Sept. 2018   Ovarian cancer screening, starting at age 30-35 Consider  transvaginal ultrasound and  tests. 9/15/2017 - Pelvic US, simple L ovarian cyst, 4 cm    9/15/2017 -  (8) normal Next: Follow up pelvic US in mid to late Oct.     Next lab: March 2018   Recommendation: risk-reducing salpingo-oophorectomy, typically between 35 and 40 years old and  upon completion of child bearing.     Because ovarian cancer onset in patients with BRCA2 mutations is on average of 8-10 years later than in patients with BRCA1 mutations, it is reasonable to delay risk-reducing salpingo-oophorectomy until 40-45 years in patients with BRCA2 mutations who have already maximized their breast cancer prevention (i.e., undergone bilateral mastectomy).    Salpingectomy alone is not the standard of care for risk reduction although clinical trials are ongoing.     Discuss option of risk-reducing mastectomy.    Consider risks and benefits of risk reducing agents, such as tamoxifen and raloxifene for breast and ovarian cancer.    Consider a full body skin and eye exam for melanoma screening for both BRCA1+ and BRCA2+.     NOTE: Women with BRCA mutation who are treated for breast cancer should have screening of the remaining breast tissue with annual mammography and breast MRI.    The International Cancer of the Pancreas Screening (CAPS) Consortium recommends that individuals with a BRCA2 mutation who have at least one first-degree relative with pancreatic cancer should undergo initial screening with endoscopic ultrasonography and/or MRI/magnetic resonance cholangiopancreatography.                 Follow-ups after your visit        Follow-up notes from your care team     Return in about 6 months (around 3/29/2018) for Physical Exam, Lab Work.      Your next 10 appointments already scheduled     Oct 27, 2017  8:30 AM CDT   US PELVIC COMPLETE W TRANSVAGINAL with UCUS1   [facility] ([facility])    [address]  [facility] [address]    [phone]           Please bring a list of your medicines (including vitamins, minerals and over-the-counter drugs). Also, tell your doctor about any allergies you may have. Wear comfortable clothes and leave your valuables at home.  Adults: Drink six 8-ounce glasses of fluid one hour before your exam. Do NOT empty your bladder.  If you need to empty your bladder before your exam, try to release only a little bit of urine. Then, drink another 8oz glass of fluid.  Children: Children who are potty trained should drink at least 4 cups (32 oz) of liquid 45 minutes to one hour prior to the exam. The child s bladder must be full in order to achieve a diagnostic exam. If your child is very uncomfortable or has an urgent need to pee, please notify a technologist; they will try to find out how much longer the wait may be and provide instructions to help relieve the pressure. Occasionally it is medically necessary to insert a urinary catheter to fill the bladder.  Please call the Imaging Department at your exam site with any questions.            Mar 30, 2018  8:30 AM CDT   US PELVIC COMPLETE W TRANSVAGINAL with UCUS1   [facility] ([facility])    [address] [address] [phone]           Please bring a list of your medicines (including vitamins, minerals and over-the-counter drugs). Also, tell your doctor about any allergies you may have. Wear comfortable clothes and leave your valuables at home.  Adults: Drink six 8-ounce glasses of fluid one hour before your exam. Do NOT empty your bladder.  If you need to empty your bladder before your exam, try to release only a little bit of urine. Then, drink another 8oz glass of fluid.  Children: Children who are potty trained should drink at least 4 cups (32 oz) of liquid 45 minutes to one hour prior to the exam. The child s bladder must be full in order to achieve a diagnostic exam. If your child  "is very uncomfortable or has an urgent need to pee, please notify a technologist; they will try to find out how much longer the wait may be and provide instructions to help relieve the pressure. Occasionally it is medically necessary to insert a urinary catheter to fill the bladder.  Please call the Imaging Department at your exam site with any questions.            Mar 30, 2018  9:30 AM CDT   Lab with K[facility]ER[facility] LAB   [facility] ([facility])    [address] [place] [address]  1st Floor  [facility] [address]   [phone]            Mar 30, 2018 10:00 AM CDT   (Arrive by 9:45 AM)   Return Visit with HENRIQUE Angela   [facility] ([facility])    [address] [place] [address]   [phone]            Mar 30, 2018 10:30 AM CDT   (Arrive by 10:15 AM)   MA SCREENING BILATERAL W/ CALEB with [email or web address]   [facility] ([facility])    [address] [place] [id number] [phone]           Three-dimensional (3D) mammograms are available at [facility] locations in [facility], [place], [place], [facility], [place], [place], and [place]. [facility] locations include [place] and [facility] & [facility] Center in [place]. Benefits of 3D mammograms include: - Improved rate of cancer detection - Decreases your chance of having to go back for more tests, which means fewer: - \"False-positive\" results (This means that there is an abnormal area but it isn't cancer.) - Invasive testing procedures, such as a biopsy or surgery - Can provide clearer images of the breast if you have dense breast tissue. 3D mammography is an optional exam that anyone can have with a 2D mammogram. It doesn't replace or take the place of a 2D mammogram. 2D mammograms remain an effective screening test for all women.  Not all insurance companies cover the cost of a 3D mammogram. " Check with your insurance.              Future tests that were ordered for you today     Open Standing Orders        Priority Remaining Interval Expires Ordered    US Pelvic Complete with Transvaginal Routine 2/2 6 9/29/2018 9/29/2017          Open Future Orders        Priority Expected Expires Ordered    US Pelvic Complete with Transvaginal Routine  9/29/2018 9/29/2017    US Pelvic Complete with Transvaginal Routine  9/29/2018 9/29/2017    MA Screen Bilateral w/Kemar Routine 3/11/2018 9/30/2018 9/29/2017    MR Breast Bilateral w/o & w Contrast Routine 9/1/2018 9/1/2018 9/29/2017     Routine  9/29/2018 9/29/2017            Who to contact     If you have questions or need follow up information about today's clinic visit or your schedule please contact [facility] directly at [phone].  Normal or non-critical lab and imaging results will be communicated to you by MyChart, letter or phone within 4 business days after the clinic has received the results. If you do not hear from us within 7 days, please contact the clinic through [employer] or phone. If you have a critical or abnormal lab result, we will notify you by phone as soon as possible.  Submit refill requests through [employer] or call your pharmacy and they will forward the refill request to us. Please allow 3 business days for your refill to be completed.          Additional Information About Your Visit        MyChart Information     [employer] gives you secure access to your electronic health record. If you see a primary care provider, you can also send messages to your care team and make appointments. If you have questions, please call your primary care clinic.  If you do not have a primary care provider, please call [phone] and they will assist you.        Care EveryWhere ID     This is your Care EveryWhere ID. This could be used by other organizations to access your [facility] medical records  [id number]        Your Vitals Were      "Pulse Temperature Respirations Height Pulse Oximetry BMI (Body Mass Index)    71 99.3  F (37.4  C) (Oral) 16 1.6 m (5' 2.99\") 96% 24.42 kg/m2       Blood Pressure from Last 3 Encounters:   09/29/17 119/80   03/10/17 122/82   11/16/16 (!) 132/93    Weight from Last 3 Encounters:   09/29/17 62.5 kg (137 lb 12.8 oz)   03/10/17 62.1 kg (136 lb 14.4 oz)   11/16/16 63.5 kg (140 lb)               Primary Care Provider Office Phone # Fax #    Aspen Renteria [phone]       [facility] [address] [place] [address]        Equal Access to Services     AMANDA [facility] : Hadii aad ku hadasho Somarycruz, waaxda luqadaha, qaybta kaalmada adeegyada, priya mccullough . So [facility] [phone].    ATENCIÓN: Si habla español, tiene a mclaughlin disposición servicios gratuitos de asistencia lingüística. Yissel al [phone].    We comply with applicable federal civil rights laws and [place] laws. We do not discriminate on the basis of race, color, national origin, age, disability sex, sexual orientation or gender identity.            Thank you!     Thank you for choosing [facility]  for your care. Our goal is always to provide you with excellent care. Hearing back from our patients is one way we can continue to improve our services. Please take a few minutes to complete the written survey that you may receive in the mail after your visit with us. Thank you!             Your Updated Medication List - Protect others around you: Learn how to safely use, store and throw away your medicines at www.disposemymeds.org.          This list is accurate as of: 9/29/17  9:19 AM.  Always use your most recent med list.                   Brand Name Dispense Instructions for use Diagnosis    buPROPion 100 MG tablet    WELLBUTRIN     Take 100 mg by mouth 2 times daily        fluticasone 50 MCG/ACT spray    FLONASE     Spray 1 spray into both nostrils daily        ibuprofen 800 MG " tablet    ADVIL/MOTRIN     Take by mouth as needed Reported on 3/10/2017        levonorgestrel 20 MCG/24HR IUD    MIRENA          M.V.I. ADULT IV      Take 1 tablet by mouth daily        tretinoin 0.05 % cream    RETIN-A     Apply topically twice a week        vitamin D 2000 UNITS tablet      Take 2,000 Units by mouth daily        zolpidem 5 MG tablet    AMBIEN     Take 5 mg by mouth nightly as needed

## 2017-09-29 NOTE — NURSING NOTE
"Oncology Rooming Note    September 29, 2017 8:38 AM   Debbie Baker is a 42 year old female who presents for:    Chief Complaint   Patient presents with     Oncology Clinic Visit     BRCA2 Positive, F/U Visit.     Initial Vitals: /80  Pulse 71  Temp 99.3  F (37.4  C) (Oral)  Resp 16  Ht 1.6 m (5' 2.99\")  Wt 62.5 kg (137 lb 12.8 oz)  SpO2 96%  BMI 24.42 kg/m2 Estimated body mass index is 24.42 kg/(m^2) as calculated from the following:    Height as of this encounter: 1.6 m (5' 2.99\").    Weight as of this encounter: 62.5 kg (137 lb 12.8 oz). Body surface area is 1.67 meters squared.  No Pain (0) Comment: Data Unavailable   No LMP recorded. Patient is not currently having periods (Reason: IUD).  Allergies reviewed: Yes  Medications reviewed: Yes    Medications: Medication refills not needed today.  Pharmacy name entered into [employer]: [id number] - [place], [place] - [address] AT [facility]    Clinical concerns: nONE Meliza Najera was NOT notified.    7 minutes for nursing intake (face to face time)     Alison Schreiber LPN              "

## 2017-09-29 NOTE — PROGRESS NOTES
Oncology Risk Management Consultation:  Date on this visit: 2017    Debbie Baker returns to the [facility] for a follow up oncology risk management consultation. She requires screening and surveillance to minimize her risk of cancer secondary to having a deleterious BRCA2 mutation.  She is considered to be at high risk for hereditary breast and ovarian cancer.    Primary Physician: Aspen Renteria MD    History Of Present Illness:  Ms. Baker is a very pleasant, healthy 42 year old female who presents with family history of breast and ovarian cancer     Genetic testin2016 - POSITIVE for a BRCA2 mutation, specifically [id number], found on single site analysis genetic testing through [facility]. The testing was done because of a known genetic mutation in the family.    Pertinent history:  Menarche at age 11  First child at age 30  Hx of breastfeeding x 6-8 month  Currently using the Mirena IUD (inserted in )  LMP: unknown  Reports a 15-20 year history of oral contraceptive use  Her only past history with breast problems was an episode of mastitis when she was breastfeeding her children  No history of breast biopsy, dysplasia, hyperplasia or malignancy.    Screening history:  2014, Screening, mammogram, BiRads1.   3/11/2016 - Screening mammogram, BiRads1  2016 - Breast MRI, BiRads1.  3/10/2017 - Screening tomosynthesis mammogram, BiRads1.  9/15/2017 - Breast MRI, BiRads1.    Ovarian screening history:  3/11/2016 - Pelvic US normal; :10 (normal)  2016 - Pelvic US normal; : 8 (normal)  3/10/2017 - R ovarian cyst   2017 - Pelvic US, R hemorrhagic cyst resolved.   9/15/2017 - Pelvic US, Simple cyst on L ovary,  Measuring up to 4 cm.    At this visit, she states she feels well and denies fatigue, pain, nipple discharge, lumps masses, skin and nipple changes and changes in symmetry. She denies constipation, diarrhea, urinary symptoms, abdominal bloating and early  satiety.    Past Medical/Surgical History:  Past Medical History:   Diagnosis Date     At high risk for breast cancer 2016    BRCA2+     At risk for cancer 2016    at risk for ovarian/fallopian tube cancer, BRCA2+     BRCA2 positive 2016    [id number]; single site analysis through [facility]     Mastitis in female      No past surgical history on file.    Allergies:  Allergies as of 2017 - Avinash as Reviewed 03/10/2017   Allergen Reaction Noted     Dust mites Other (See Comments) 2016     No known drug allergies  2016       Current Medications:  Current Outpatient Prescriptions   Medication Sig Dispense Refill     ibuprofen (ADVIL,MOTRIN) 800 MG tablet Reported on 3/10/2017       Multiple Vitamin (M.V.I. ADULT IV) Take 1 tablet by mouth       tretinoin (RETIN-A) 0.05 % cream        zolpidem (AMBIEN) 5 MG tablet Take 5 mg by mouth       buPROPion (WELLBUTRIN) 100 MG tablet        fluticasone (FLONASE) 50 MCG/ACT nasal spray        levonorgestrel (MIRENA) 20 MCG/24HR IUD        Cholecalciferol (VITAMIN D) 2000 UNITS tablet           Family History:  Family History   Problem Relation Age of Onset     Hereditary Breast and Ovarian Cancer Syndrome Mother      BRCA2+ (same genetic mutation), no cancer     Ovarian Cancer Maternal Grandmother 59      at 61     Pancreatic Cancer Maternal Grandfather 87      at 87     Skin Cancer Paternal Grandmother 88     Bladder Cancer Paternal Grandfather 60      at 80     Hereditary Breast and Ovarian Cancer Syndrome Maternal Uncle      BRCA2+, no cancer     Breast Cancer Maternal Aunt 51     BRCA2+     Hereditary Breast and Ovarian Cancer Syndrome Cousin      BRCA2+       Social History:  Social History     Social History     Marital status:      Spouse name: Tavares     Number of children: 2     Years of education: N/A     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[employer]     Social History Main Topics      Smoking status: Never Smoker     Smokeless tobacco: Not on file     Alcohol use 0.0 oz/week      Comment: 1 drink/week     Drug use: No     Sexual activity: Yes     Partners: Male     Birth control/ protection: IUD      Comment: Mirena     Other Topics Concern     Caffeine Concern No     Occupational Exposure No     Hobby Hazards No     Sleep Concern No     Stress Concern Yes     Weight Concern No     Special Diet No     Back Care No     Exercise Yes     works out with , does yoga 3-4x/week     Seat Belt Yes     Self-Exams Yes     Parent/Sibling W/ Cabg, Mi Or Angioplasty Before 65f 55m? No     Social History Narrative       Physical Exam:  There were no vitals taken for this visit.  Physical exam deferred.    Laboratory/Imaging Studies  Results for orders placed or performed in visit on 09/15/17   MR Breast Bilateral w Contrast    Narrative    Exam: Breast MRI, with and without Contrast, and with color encoding     History/Indication: High risk screening. BRCA2 mutation.    Comparison: 9/9/2016    Technique: Precontrast gradient recall axial nonfat sat T1.Precontrast  gradient recall axial fat-sat T1. Precontrast STIR axial fat  sat.Postcontrast gradient recall axial fat-sat T1 with dynamic  postcontrast acquisitions at 2, 4 and 6 minutes with subtractions.  Delayed high-resolution gradient recall sagittal fat-sat T1. MIP of  subtractions, axial coronal and sagittal. Color encoding of post  contrast dynamic acquisitions. IV contrast: 6mL Gadavist    Breast composition: Scattered fibroglandular tissue    Background parenchymal enhancement 1st post C image: Minimal    Findings: No suspicious enhancement in either breast. No  lymphadenopathy.      Impression    Impression: BI-RADS CATEGORY: 1 -  NEGATIVE.    Recommendation: Annual breast cancer screening.    I have personally reviewed the examination and initial interpretation  and I agree with the findings.    FLORI LUA MD       ASSESSMENT  Because  of the larger cyst on her L ovary, I am ordering Debbie a follow up US in mid to late October to blanche.    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  She is managing her stress well and continues to exercise, not smoke, maintain a healthy weight and limit alcohol.     We revisited the estimations of her risk, according to Gloria et al 2007, her risk for breast cancer is about 8% right now and may rise to 25.79% by 59, ultimately at 49.86% by 78 years old. It does decrease with a RRSO, but some estimates show only a 10-20% reduction, while others estimate it to be up to 50%. She recognizes that risk estimations do not take into account health promotion practices or individual phenotypes correlated with specific genetic mutations within BRCA2. At this point, she is planning to review her risk in 2 years and consider when to have an RRSO.      INDIVIDUALIZED CANCER RISK MANAGEMENT PLAN:  Individualized Surveillance Plan for women  Hereditary Breast and/or Ovarian Cancer Syndrome   Per NCCN Guidelines Version 2.2017   Recommended screening Test or procedure Last done Next Scheduled    Breast self awareness- starting at age 18. Women should be familiar with their breasts and promptly report changes to their care provider. Periodic, consistent self exam may facilitate breast self awareness.    9/29/2017 March 2018   Breast screening, starting at age 25 Clinical breast exams every 6 -12 months 3/10/ 2017 March 2018   Breast screening   Age 25-29 Annual breast MRI screening with contrast (preferred) or mammogram if MRI is unavailable or individualized based on family history if a breast cancer diagnosis before age 30 is present.       Breast MRI is performed preferably on day 7-15 of menstrual cycle for premenopausal women.     NA     NA   Breast screening   Age >30-75 years     Annual mammogram and   annual breast MRI, preferably on day  7-15 of menstrual cycle for premenopausal women.    Age>75 years, management should be considered on an individual basis. 3/10/2017 - Screening 3D mammogram, BiRads1    9/15/2017 - Breast MRI, BiRads1 NEXT: Exam in March followed by tomosynthesis mammogram    Next MRI: Sept. 2018   Ovarian cancer screening, starting at age 30-35 Consider transvaginal ultrasound and  tests. 9/15/2017 - Pelvic US, simple L ovarian cyst, 4 cm    9/15/2017 -  (8) normal Next: Follow up pelvic US in mid to late Oct.     Next lab: March 2018   Recommendation: risk-reducing salpingo-oophorectomy, typically between 35 and 40 years old and  upon completion of child bearing.     Because ovarian cancer onset in patients with BRCA2 mutations is on average of 8-10 years later than in patients with BRCA1 mutations, it is reasonable to delay risk-reducing salpingo-oophorectomy until 40-45 years in patients with BRCA2 mutations who have already maximized their breast cancer prevention (i.e., undergone bilateral mastectomy).    Salpingectomy alone is not the standard of care for risk reduction although clinical trials are ongoing.     Discuss option of risk-reducing mastectomy.    Consider risks and benefits of risk reducing agents, such as tamoxifen and raloxifene for breast and ovarian cancer.    Consider a full body skin and eye exam for melanoma screening for both BRCA1+ and BRCA2+.     NOTE: Women with BRCA mutation who are treated for breast cancer should have screening of the remaining breast tissue with annual mammography and breast MRI.    The International Cancer of the Pancreas Screening (CAPS) Consortium recommends that individuals with a BRCA2 mutation who have at least one first-degree relative with pancreatic cancer should undergo initial screening with endoscopic ultrasonography and/or MRI/magnetic resonance cholangiopancreatography.     I will follow up on her screenings and see her in the Cancer Risk Management clinic in  six months.    I spent 27 minutes with the patient with greater that 50% of it in counseling and coordinating care as documented above.    Meliza Najera, APRN-CNS, OCN, ANG-BC  Clinical Nurse Specialist  Cancer Risk Management Program  [address]  [place] [address]    phone:  [phone]  Pager: [phone]  fax: [phone]    Cc: Aspen Renteria MD

## 2017-12-20 ENCOUNTER — RADIANT APPOINTMENT (OUTPATIENT)
Dept: ULTRASOUND IMAGING | Facility: CLINIC | Age: 42
End: 2017-12-20
Attending: CLINICAL NURSE SPECIALIST
Payer: COMMERCIAL

## 2017-12-20 DIAGNOSIS — Z15.01 BRCA2 POSITIVE: ICD-10-CM

## 2017-12-20 DIAGNOSIS — Z15.09 BRCA2 POSITIVE: ICD-10-CM

## 2017-12-20 DIAGNOSIS — Z80.41 FAMILY HISTORY OF MALIGNANT NEOPLASM OF OVARY: ICD-10-CM

## 2017-12-20 DIAGNOSIS — Z91.89 AT RISK FOR CANCER: ICD-10-CM

## 2017-12-20 DIAGNOSIS — N83.209 OVARIAN CYST: ICD-10-CM

## 2018-03-30 ENCOUNTER — RADIANT APPOINTMENT (OUTPATIENT)
Dept: MAMMOGRAPHY | Facility: CLINIC | Age: 43
End: 2018-03-30
Payer: COMMERCIAL

## 2018-03-30 ENCOUNTER — RADIANT APPOINTMENT (OUTPATIENT)
Dept: ULTRASOUND IMAGING | Facility: CLINIC | Age: 43
End: 2018-03-30
Attending: CLINICAL NURSE SPECIALIST
Payer: COMMERCIAL

## 2018-03-30 ENCOUNTER — ONCOLOGY VISIT (OUTPATIENT)
Dept: ONCOLOGY | Facility: CLINIC | Age: 43
End: 2018-03-30
Attending: CLINICAL NURSE SPECIALIST
Payer: COMMERCIAL

## 2018-03-30 VITALS
SYSTOLIC BLOOD PRESSURE: 114 MMHG | TEMPERATURE: 98.2 F | WEIGHT: 142 LBS | RESPIRATION RATE: 16 BRPM | HEIGHT: 63 IN | BODY MASS INDEX: 25.16 KG/M2 | OXYGEN SATURATION: 99 % | HEART RATE: 68 BPM | DIASTOLIC BLOOD PRESSURE: 80 MMHG

## 2018-03-30 DIAGNOSIS — Z15.01 BRCA2 POSITIVE: ICD-10-CM

## 2018-03-30 DIAGNOSIS — Z15.01 BRCA2 POSITIVE: Primary | ICD-10-CM

## 2018-03-30 DIAGNOSIS — Z80.3 FAMILY HISTORY OF MALIGNANT NEOPLASM OF BREAST: ICD-10-CM

## 2018-03-30 DIAGNOSIS — N83.209 OVARIAN CYST: ICD-10-CM

## 2018-03-30 DIAGNOSIS — Z80.41 FAMILY HISTORY OF MALIGNANT NEOPLASM OF OVARY: ICD-10-CM

## 2018-03-30 DIAGNOSIS — Z15.09 BRCA2 POSITIVE: Primary | ICD-10-CM

## 2018-03-30 DIAGNOSIS — Z91.89 AT RISK FOR CANCER: ICD-10-CM

## 2018-03-30 DIAGNOSIS — Z15.09 BRCA2 POSITIVE: ICD-10-CM

## 2018-03-30 DIAGNOSIS — Z12.39 BREAST CANCER SCREENING, HIGH RISK PATIENT: ICD-10-CM

## 2018-03-30 LAB — CANCER AG125 SERPL-ACNC: 6 U/ML (ref 0–30)

## 2018-03-30 PROCEDURE — 36415 COLL VENOUS BLD VENIPUNCTURE: CPT | Performed by: CLINICAL NURSE SPECIALIST

## 2018-03-30 PROCEDURE — 86304 IMMUNOASSAY TUMOR CA 125: CPT | Performed by: CLINICAL NURSE SPECIALIST

## 2018-03-30 PROCEDURE — G0463 HOSPITAL OUTPT CLINIC VISIT: HCPCS | Mod: ZF

## 2018-03-30 PROCEDURE — 99213 OFFICE O/P EST LOW 20 MIN: CPT | Mod: ZP | Performed by: CLINICAL NURSE SPECIALIST

## 2018-03-30 ASSESSMENT — PAIN SCALES - GENERAL: PAINLEVEL: NO PAIN (0)

## 2018-03-30 NOTE — PROGRESS NOTES
Oncology Risk Management Consultation:  Date on this visit: 3/30/2018    Debbie Baker returns to the [facility] today for a follow up oncology risk management consultation. She requires screening and surveillance to minimize her risk of cancer secondary to having a deleterious BRCA2 mutation.  She is considered to be at high risk for hereditary breast and ovarian cancer.    Primary Physician: HENRIQUE Kaye-NP    History Of Present Illness:  Ms. Baker is a very pleasant, healthy 42 year old female who presents with family history of breast and ovarian cancer and a personal diagnosis of a BRCA2 mutation.     Genetic testin2016 - POSITIVE for a BRCA2 mutation, specifically [id number], found on single site analysis genetic testing through [employer] [facility]. The testing was done because of a known genetic mutation in the family.    Pertinent history:  Menarche at age 11  First child at age 30  Hx of breastfeeding x 6-8 month  Currently using the Mirena IUD (inserted in )  LMP: unknown  Reports a 15-20 year history of oral contraceptive use  Her only past history with breast problems was an episode of mastitis when she was breastfeeding her children  No history of breast biopsy, dysplasia, hyperplasia or malignancy.    Screening history:  2014, Screening, mammogram, BiRads1.   3/11/2016 - Screening mammogram, BiRads1  2016 - Breast MRI, BiRads1.  3/10/2017 - Screening tomosynthesis mammogram, BiRads1.  9/15/2017 - Breast MRI, BiRads1.    Ovarian screening history:  3/11/2016 - Pelvic US normal; :10 (normal)  2016 - Pelvic US normal; : 8 (normal)  3/10/ 2017 - R ovarian cyst   2017 - Pelvic US, R hemorrhagic cyst resolved.   9/15/2017 - Pelvic US, Simple cyst on L ovary,  Measuring up to 4 cm.  2017 - Pelvic US, Simple left ovarian cyst measuring up to 2.6 cm reduced in size compared to last examination indicating a physiologic dominant follicle.     At this visit,  she reports feeling well. She denies abdominal pain, bloating, early satiety, unusual vaginal discharge/bleeding, urinary symptoms. She denies breast pain, new fatigue, lumps, masses, thickenings, nipple discharge, dimpling and changes in symmetry or skin of her breasts.    Past Medical/Surgical History:  Past Medical History:   Diagnosis Date     At high risk for breast cancer 2016    BRCA2+     At risk for cancer 2016    at risk for ovarian/fallopian tube cancer, BRCA2+     BRCA2 positive 2016    [id number]; single site analysis through [facility]     Mastitis in female 2010     No past surgical history on file.    Allergies:  Allergies as of 2018 - Avinash as Reviewed 2018   Allergen Reaction Noted     Dust mites Other (See Comments) 2016       Current Medications:  Current Outpatient Prescriptions   Medication Sig Dispense Refill     ibuprofen (ADVIL,MOTRIN) 800 MG tablet Take by mouth as needed Reported on 3/10/2017       Multiple Vitamin (M.V.I. ADULT IV) Take 1 tablet by mouth daily        tretinoin (RETIN-A) 0.05 % cream Apply topically twice a week        buPROPion (WELLBUTRIN) 100 MG tablet Take 100 mg by mouth 2 times daily        fluticasone (FLONASE) 50 MCG/ACT nasal spray Spray 1 spray into both nostrils daily        levonorgestrel (MIRENA) 20 MCG/24HR IUD        Cholecalciferol (VITAMIN D) 2000 UNITS tablet Take 2,000 Units by mouth daily        zolpidem (AMBIEN) 5 MG tablet Take 5 mg by mouth nightly as needed           Family History:  Family History   Problem Relation Age of Onset     Hereditary Breast and Ovarian Cancer Syndrome Mother      BRCA2+ (same genetic mutation), no cancer     Ovarian Cancer Maternal Grandmother 59      at 61     Pancreatic Cancer Maternal Grandfather 87      at 87     Skin Cancer Paternal Grandmother 88     Bladder Cancer Paternal Grandfather 60      at 80     Hereditary Breast and Ovarian Cancer Syndrome Maternal  "Uncle      BRCA2+, no cancer     Breast Cancer Maternal Aunt 51     BRCA2+     Hereditary Breast and Ovarian Cancer Syndrome Cousin      BRCA2+       Social History:  Social History     Social History     Marital status:      Spouse name: Tavares     Number of children: 2     Years of education: N/A     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     Social History Main Topics     Smoking status: Never Smoker     Smokeless tobacco: Never Used     Alcohol use 0.0 oz/week      Comment: 1 drink/week     Drug use: No     Sexual activity: Yes     Partners: Male     Birth control/ protection: IUD      Comment: Mirena     Other Topics Concern     Caffeine Concern No     Occupational Exposure No     Hobby Hazards No     Sleep Concern No     Stress Concern Yes     Weight Concern No     Special Diet No     Back Care No     Exercise Yes     works out with , does yoga 3-4x/week     Seat Belt Yes     Self-Exams Yes     Parent/Sibling W/ Cabg, Mi Or Angioplasty Before 65f 55m? No     Social History Narrative       Physical Exam:  /80  Pulse 68  Temp 98.2  F (36.8  C) (Oral)  Resp 16  Ht 1.6 m (5' 2.99\")  Wt 64.4 kg (142 lb)  SpO2 99%  BMI 25.16 kg/m2  Physical exam deferred.    Laboratory/Imaging Studies  Results for orders placed or performed in visit on 03/30/18   US Pelvic Complete with Transvaginal    Narrative    EXAMINATION: TEMPORARY, 3/30/2018 8:54 AM     COMPARISON: Pelvic ultrasound 12/20/2017    HISTORY: Ovarian cyst, BRCA2 positive    TECHNIQUE: The pelvis was scanned in standard fashion with  transabdominal and transvaginal transducer(s) using both grey scale  and color Doppler techniques.    FINDINGS:  The uterus measures 9.6 x 5.9 x 3.6 cm, and there is no evidence of a  focal fibroid.  Intrauterine device is appropriately positioned in the  endometrial canal. The endometrium is within normal limits and  measures 2.8 mm. There is no free fluid in the " pelvis.    The right ovary measures 2.0 x 4.3 x 0.6 cm and the left ovary  measures 2.2 x 3.3 x 3.4 cm. Unchanged anechoic fluid-filled 2.5 cm  left ovarian cyst. There is no other adnexal mass. There is normal  blood flow to the ovaries.      Impression    IMPRESSION:   1.  Unchanged 2.5 cm left ovarian cyst with no septations, vascularity  or solid components.    2.  Otherwise normal pelvic ultrasound with IUD in appropriate  position.    I have personally reviewed the examination and initial interpretation  and I agree with the findings.    RENUKA MARIA MD       ASSESSMENT  We discussed her interval history and the preliminary results of her mammogram and her pelvic ultrasound (see above). Her preliminary results of her mammogram are normal.    We reviewed her options for a salpingo oophorectomy; her mother is  wanting her to have her ovaries out but she has concerns for long term repercussions of early surgical menopause including cardiac issues, osteoporosis and dementia. We discussed that she could consider the WISP Clinical trial and she was given information about the trial involving a salpingectomy now and deferring an oophorectomy. The ovarian cancers in her families were her grandmother at age 61 and her maternal aunt Aspen in her early 70s.     We updated her family pedigree to add:  One male maternal first cousin once removed (son of her great aunt, Aspen), who  of pancreatic cancer at age 67.    We discussed that as both her maternal grandfather and her maternal second cousin  of pancreatic cancer, she could consider a consultation with Dr. Cheatham when she is closer to age 49 if she is interested in a discussion of pancreatic screening. Excluding the BRCA2+ mutation, she has no other risk factors for pancreatic cancer, such as smoking, alcohol abuse, diabetes, etc. She will consider this at a later point in time.    She did have a visit with a dermatologist during the interval with no  concerns.   I will follow up on her screenings and see her in the Cancer Risk Management clinic in one year.     INDIVIDUALIZED CANCER RISK MANAGEMENT PLAN:  Individualized Surveillance Plan for women  Hereditary Breast and/or Ovarian Cancer Syndrome   Per NCCN Guidelines Version 1.2018   Recommended screening Test or procedure Last done Next Scheduled    Breast self awareness- starting at age 18. Women should be familiar with their breasts and promptly report changes to their care provider. Periodic, consistent self exam may facilitate breast self awareness.    3/30/2018   March 2019   Breast screening, starting at age 25 Clinical breast exams every 6 -12 months 3/30/2018 March 2019   Breast screening   Age 25-29 Annual breast MRI screening with contrast (preferred) or mammogram if MRI is unavailable or individualized based on family history if a breast cancer diagnosis before age 30 is present.       Breast MRI is performed preferably on day 7-15 of menstrual cycle for premenopausal women.     NA     NA   Breast screening   Age >30-75 years     Annual mammogram and   annual breast MRI, preferably on day 7-15 of menstrual cycle for premenopausal women.    Age>75 years, management should be considered on an individual basis. 9/15/2017 - Breast MRI, BiRads1    3/10/2017- Screening tomosynthesis mammogram, BiRads1 NEXT: Tomosynthesis mammogram after appt today    NEXT: Breast MRI in Sept (no sooner than 9/16)   Ovarian cancer screening, starting at age 30-35 Consider transvaginal ultrasound and  tests. 9/15/2017- Pelvic US, simple cyst of left ovary    3/30/2018 - Pelvic US and  today NEXT:  Sept. for both screenings   Recommendation: risk-reducing salpingo-oophorectomy, typically between 35 and 40 years old and  upon completion of child bearing.     Because ovarian cancer onset in patients with BRCA2 mutations is on average of 8-10 years later than in patients with BRCA1 mutations, it is reasonable to delay  risk-reducing salpingo-oophorectomy until 40-45 years in patients with BRCA2 mutations who have already maximized their breast cancer prevention (i.e., undergone bilateral mastectomy).    Salpingectomy alone is not the standard of care for risk reduction although clinical trials are ongoing.     Discuss option of risk-reducing mastectomy.    Consider risks and benefits of risk reducing agents, such as tamoxifen and raloxifene for breast and ovarian cancer.    Consider a full body skin and eye exam for melanoma screening for both BRCA1+ and BRCA2+.     NOTE: Women with BRCA mutation who are treated for breast cancer should have screening of the remaining breast tissue with annual mammography and breast MRI.    The International Cancer of the Pancreas Screening (CAPS) Consortium recommends that individuals with a BRCA2 mutation who have at least one first-degree relative with pancreatic cancer should undergo initial screening with endoscopic ultrasonography and/or MRI/magnetic resonance cholangiopancreatography.   I spent 20 minutes with the patient with greater that 50% of it in counseling and coordinating care as documented above.    HENRIQUE Angela-CNS, OCN, ANG-BC  Clinical Nurse Specialist  Cancer Risk Management Program  [address]  [place] [address]    phone:  [phone]  Pager: [phone]  fax: [phone]    Cc:  SASHA Kaye

## 2018-03-30 NOTE — NURSING NOTE
"Oncology Rooming Note    March 30, 2018 9:56 AM   Debbie Baker is a 42 year old female who presents for:    Chief Complaint   Patient presents with     Oncology Clinic Visit     6 month f/u BRACA2 positive     Initial Vitals: /80  Pulse 68  Temp 98.2  F (36.8  C) (Oral)  Resp 16  Ht 1.6 m (5' 2.99\")  Wt 64.4 kg (142 lb)  SpO2 99%  BMI 25.16 kg/m2 Estimated body mass index is 25.16 kg/(m^2) as calculated from the following:    Height as of this encounter: 1.6 m (5' 2.99\").    Weight as of this encounter: 64.4 kg (142 lb). Body surface area is 1.69 meters squared.  No Pain (0) Comment: Data Unavailable   No LMP recorded. Patient is not currently having periods (Reason: IUD).  Allergies reviewed: Yes  Medications reviewed: Yes    Medications: Medication refills not needed today.  Pharmacy name entered into Pin digital: [id number] - [place], [place] - [address] AT [facility] & [facility]    Clinical concerns: none Meliza was NOT notified.    10 minutes for nursing intake (face to face time)     MARAL LEONE LPN            "

## 2018-03-30 NOTE — LETTER
Cancer Risk Management  Program Locations    [facility]  [facility]  [facility]  [facility]  Mailing Address  Cancer Risk Management Program  [facility]  [address]  [facility] [address]  [place] [address]    New patient appointments  [phone]  2018    Debbie Baker  [address]  [facility] [address]      Dear Debbie,    As always, a pleasure seeing you doing so well today.  Below is a copy of my note from our visit, outlining your surveillance plan.      I look forward to seeing you in the future to coordinate your care and reduce your health risks. Please feel free to contact me if you have any questions or concerns.      Oncology Risk Management Consultation:  Date on this visit: 3/30/2018    Debbie Baker returns to the [facility] today for a follow up oncology risk management consultation. She requires screening and surveillance to minimize her risk of cancer secondary to having a deleterious BRCA2 mutation.  She is considered to be at high risk for hereditary breast and ovarian cancer.    Primary Physician: HENRIQUE Kaye-NP    History Of Present Illness:  Ms. Baker is a very pleasant, healthy 42 year old female who presents with family history of breast and ovarian cancer and a personal diagnosis of a BRCA2 mutation.     Genetic testin2016 - POSITIVE for a BRCA2 mutation, specifically [id number], found on single site analysis genetic testing through [facility]. The testing was done because of a known genetic mutation in the family.    Pertinent history:  Menarche at age 11  First child at age 30  Hx of breastfeeding x 6-8 month  Currently using the Mirena IUD (inserted in )  LMP: unknown  Reports a 15-20 year history of oral contraceptive use  Her only past history with breast problems was an episode of  mastitis when she was breastfeeding her children  No history of breast biopsy, dysplasia, hyperplasia or malignancy.    Screening history:  11/17/2014, Screening, mammogram, [email or web address].   3/11/2016 - Screening mammogram, BiRads1  9/9/2016 - Breast MRI, BiRads1.  3/10/2017 - Screening tomosynthesis mammogram, BiRads1.  9/15/2017 - Breast MRI, BiRads1.    Ovarian screening history:  3/11/2016 - Pelvic US normal; :10 (normal)  9/9/2016 - Pelvic US normal; : 8 (normal)  3/10/ 2017 - R ovarian cyst   4/13/2017 - Pelvic US, R hemorrhagic cyst resolved.   9/15/2017 - Pelvic US, Simple cyst on L ovary,  Measuring up to 4 cm.  12/20/2017 - Pelvic US, Simple left ovarian cyst measuring up to 2.6 cm reduced in size compared to last examination indicating a physiologic dominant follicle.     At this visit, she reports feeling well. She denies abdominal pain, bloating, early satiety, unusual vaginal discharge/bleeding, urinary symptoms. She denies breast pain, new fatigue, lumps, masses, thickenings, nipple discharge, dimpling and changes in symmetry or skin of her breasts.    Past Medical/Surgical History:  Past Medical History:   Diagnosis Date     At high risk for breast cancer 01/12/2016    BRCA2+     At risk for cancer 01/12/2016    at risk for ovarian/fallopian tube cancer, BRCA2+     BRCA2 positive 01/12/2016    [id number]; single site analysis through [facility]     Mastitis in female 2010     No past surgical history on file.    Allergies:  Allergies as of 03/30/2018 - Avinash as Reviewed 03/30/2018   Allergen Reaction Noted     Dust mites Other (See Comments) 03/04/2016       Current Medications:  Current Outpatient Prescriptions   Medication Sig Dispense Refill     ibuprofen (ADVIL,MOTRIN) 800 MG tablet Take by mouth as needed Reported on 3/10/2017       Multiple Vitamin (M.V.I. ADULT IV) Take 1 tablet by mouth daily        tretinoin (RETIN-A) 0.05 % cream Apply topically twice a week        buPROPion  (WELLBUTRIN) 100 MG tablet Take 100 mg by mouth 2 times daily        fluticasone (FLONASE) 50 MCG/ACT nasal spray Spray 1 spray into both nostrils daily        levonorgestrel (MIRENA) 20 MCG/24HR IUD        Cholecalciferol (VITAMIN D) 2000 UNITS tablet Take 2,000 Units by mouth daily        zolpidem (AMBIEN) 5 MG tablet Take 5 mg by mouth nightly as needed           Family History:  Family History   Problem Relation Age of Onset     Hereditary Breast and Ovarian Cancer Syndrome Mother      BRCA2+ (same genetic mutation), no cancer     Ovarian Cancer Maternal Grandmother 59      at 61     Pancreatic Cancer Maternal Grandfather 87      at 87     Skin Cancer Paternal Grandmother 88     Bladder Cancer Paternal Grandfather 60      at 80     Hereditary Breast and Ovarian Cancer Syndrome Maternal Uncle      BRCA2+, no cancer     Breast Cancer Maternal Aunt 51     BRCA2+     Hereditary Breast and Ovarian Cancer Syndrome Cousin      BRCA2+       Social History:  Social History     Social History     Marital status:      Spouse name: Tavares     Number of children: 2     Years of education: N/A     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[employer]     Social History Main Topics     Smoking status: Never Smoker     Smokeless tobacco: Never Used     Alcohol use 0.0 oz/week      Comment: 1 drink/week     Drug use: No     Sexual activity: Yes     Partners: Male     Birth control/ protection: IUD      Comment: Mirena     Other Topics Concern     Caffeine Concern No     Occupational Exposure No     Hobby Hazards No     Sleep Concern No     Stress Concern Yes     Weight Concern No     Special Diet No     Back Care No     Exercise Yes     works out with , does yoga 3-4x/week     Seat Belt Yes     Self-Exams Yes     Parent/Sibling W/ Cabg, Mi Or Angioplasty Before 65f 55m? No     Social History Narrative       Physical Exam:  /80  Pulse 68  Temp 98.2  F (36.8  C) (Oral)  Resp 16   "Ht 1.6 m (5' 2.99\")  Wt 64.4 kg (142 lb)  SpO2 99%  BMI 25.16 kg/m2  Physical exam deferred.    Laboratory/Imaging Studies  Results for orders placed or performed in visit on 03/30/18   US Pelvic Complete with Transvaginal    Narrative    EXAMINATION: TEMPORARY, 3/30/2018 8:54 AM     COMPARISON: Pelvic ultrasound 12/20/2017    HISTORY: Ovarian cyst, BRCA2 positive    TECHNIQUE: The pelvis was scanned in standard fashion with  transabdominal and transvaginal transducer(s) using both grey scale  and color Doppler techniques.    FINDINGS:  The uterus measures 9.6 x 5.9 x 3.6 cm, and there is no evidence of a  focal fibroid.  Intrauterine device is appropriately positioned in the  endometrial canal. The endometrium is within normal limits and  measures 2.8 mm. There is no free fluid in the pelvis.    The right ovary measures 2.0 x 4.3 x 0.6 cm and the left ovary  measures 2.2 x 3.3 x 3.4 cm. Unchanged anechoic fluid-filled 2.5 cm  left ovarian cyst. There is no other adnexal mass. There is normal  blood flow to the ovaries.      Impression    IMPRESSION:   1.  Unchanged 2.5 cm left ovarian cyst with no septations, vascularity  or solid components.    2.  Otherwise normal pelvic ultrasound with IUD in appropriate  position.    I have personally reviewed the examination and initial interpretation  and I agree with the findings.    RENUKA MARIA MD       ASSESSMENT  We discussed her interval history and the preliminary results of her mammogram and her pelvic ultrasound (see above). Her preliminary results of her mammogram are normal.    We reviewed her options for a salpingo oophorectomy; her mother is  wanting her to have her ovaries out but she has concerns for long term repercussions of early surgical menopause including cardiac issues, osteoporosis and dementia. We discussed that she could consider the WISP Clinical trial and she was given information about the trial involving a salpingectomy now and deferring " an oophorectomy. The ovarian cancers in her families were her grandmother at age 61 and her maternal aunt Aspen in her early 70s.     We updated her family pedigree to add:  One male maternal first cousin once removed (son of her great aunt, Aspen), who  of pancreatic cancer at age 67.    We discussed that as both her maternal grandfather and her maternal second cousin  of pancreatic cancer, she could consider a consultation with Dr. Cheatham when she is closer to age 49 if she is interested in a discussion of pancreatic screening. Excluding the BRCA2+ mutation, she has no other risk factors for pancreatic cancer, such as smoking, alcohol abuse, diabetes, etc. She will consider this at a later point in time.    She did have a visit with a dermatologist during the interval with no concerns.   I will follow up on her screenings and see her in the Cancer Risk Management clinic in one year.     INDIVIDUALIZED CANCER RISK MANAGEMENT PLAN:  Individualized Surveillance Plan for women  Hereditary Breast and/or Ovarian Cancer Syndrome   Per NCCN Guidelines Version 1.2018   Recommended screening Test or procedure Last done Next Scheduled    Breast self awareness- starting at age 18. Women should be familiar with their breasts and promptly report changes to their care provider. Periodic, consistent self exam may facilitate breast self awareness.    3/30/2018   2019   Breast screening, starting at age 25 Clinical breast exams every 6 -12 months 3/30/2018 2019   Breast screening   Age 25-29 Annual breast MRI screening with contrast (preferred) or mammogram if MRI is unavailable or individualized based on family history if a breast cancer diagnosis before age 30 is present.       Breast MRI is performed preferably on day 7-15 of menstrual cycle for premenopausal women.     NA     NA   Breast screening   Age >30-75 years     Annual mammogram and   annual breast MRI, preferably on day 7-15 of menstrual cycle for  premenopausal women.    Age>75 years, management should be considered on an individual basis. 9/15/2017 - Breast MRI, BiRads1    3/10/2017- Screening tomosynthesis mammogram, BiRads1 NEXT: Tomosynthesis mammogram after appt today    NEXT: Breast MRI in Sept (no sooner than 9/16)   Ovarian cancer screening, starting at age 30-35 Consider transvaginal ultrasound and  tests. 9/15/2017- Pelvic US, simple cyst of left ovary    3/30/2018 - Pelvic US and  today NEXT:  Sept. for both screenings   Recommendation: risk-reducing salpingo-oophorectomy, typically between 35 and 40 years old and  upon completion of child bearing.     Because ovarian cancer onset in patients with BRCA2 mutations is on average of 8-10 years later than in patients with BRCA1 mutations, it is reasonable to delay risk-reducing salpingo-oophorectomy until 40-45 years in patients with BRCA2 mutations who have already maximized their breast cancer prevention (i.e., undergone bilateral mastectomy).    Salpingectomy alone is not the standard of care for risk reduction although clinical trials are ongoing.     Discuss option of risk-reducing mastectomy.    Consider risks and benefits of risk reducing agents, such as tamoxifen and raloxifene for breast and ovarian cancer.    Consider a full body skin and eye exam for melanoma screening for both BRCA1+ and BRCA2+.     NOTE: Women with BRCA mutation who are treated for breast cancer should have screening of the remaining breast tissue with annual mammography and breast MRI.    The International Cancer of the Pancreas Screening (CAPS) Consortium recommends that individuals with a BRCA2 mutation who have at least one first-degree relative with pancreatic cancer should undergo initial screening with endoscopic ultrasonography and/or MRI/magnetic resonance cholangiopancreatography.   I spent 20 minutes with the patient with greater that 50% of it in counseling and coordinating care as documented  above.    HENRIQUE Angela-CNS, OCN, ANG-BC  Clinical Nurse Specialist  Cancer Risk Management Program  [address]  [place] [address]    phone:  [phone]  Pager: [phone]  fax: [phone]    Cc:  HONORIO KayeNP

## 2018-03-30 NOTE — PATIENT INSTRUCTIONS
Individualized Surveillance Plan for women  Hereditary Breast and/or Ovarian Cancer Syndrome   Per NCCN Guidelines Version 1.2018   Recommended screening Test or procedure Last done Next Scheduled    Breast self awareness- starting at age 18. Women should be familiar with their breasts and promptly report changes to their care provider. Periodic, consistent self exam may facilitate breast self awareness.    3/30/2018   March 2019   Breast screening, starting at age 25 Clinical breast exams every 6 -12 months 3/30/2018 March 2019   Breast screening   Age 25-29 Annual breast MRI screening with contrast (preferred) or mammogram if MRI is unavailable or individualized based on family history if a breast cancer diagnosis before age 30 is present.       Breast MRI is performed preferably on day 7-15 of menstrual cycle for premenopausal women.     NA     NA   Breast screening   Age >30-75 years     Annual mammogram and   annual breast MRI, preferably on day 7-15 of menstrual cycle for premenopausal women.    Age>75 years, management should be considered on an individual basis. 9/15/2017 - Breast MRI, BiRads1    3/10/2017- Screening tomosynthesis mammogram, BiRads1 NEXT: Tomosynthesis mammogram after appt today    NEXT: Breast MRI in Sept (no sooner than 9/16)   Ovarian cancer screening, starting at age 30-35 Consider transvaginal ultrasound and  tests. 9/15/2017- Pelvic US, simple cyst of left ovary    3/30/2018 - Pelvic US and  today NEXT:  Sept. for both screenings   Recommendation: risk-reducing salpingo-oophorectomy, typically between 35 and 40 years old and  upon completion of child bearing.     Because ovarian cancer onset in patients with BRCA2 mutations is on average of 8-10 years later than in patients with BRCA1 mutations, it is reasonable to delay risk-reducing salpingo-oophorectomy until 40-45 years in patients with BRCA2 mutations who have already maximized their breast cancer prevention (i.e.,  undergone bilateral mastectomy).    Salpingectomy alone is not the standard of care for risk reduction although clinical trials are ongoing.     Discuss option of risk-reducing mastectomy.    Consider risks and benefits of risk reducing agents, such as tamoxifen and raloxifene for breast and ovarian cancer.    Consider a full body skin and eye exam for melanoma screening for both BRCA1+ and BRCA2+.     NOTE: Women with BRCA mutation who are treated for breast cancer should have screening of the remaining breast tissue with annual mammography and breast MRI.    The International Cancer of the Pancreas Screening (CAPS) Consortium recommends that individuals with a BRCA2 mutation who have at least one first-degree relative with pancreatic cancer should undergo initial screening with endoscopic ultrasonography and/or MRI/magnetic resonance cholangiopancreatography.

## 2018-03-30 NOTE — MR AVS SNAPSHOT
After Visit Summary   3/30/2018    Debbie Baker    MRN: [id number]           Patient Information     Date Of Birth          1975        Visit Information        Provider Department      3/30/2018 10:00 AM Meliza Najera APRN [facility]        Today's Diagnoses     BRCA2 positive    -  1    Family history of malignant neoplasm of ovary          Care Instructions    Individualized Surveillance Plan for women  Hereditary Breast and/or Ovarian Cancer Syndrome   Per NCCN Guidelines Version 1.2018   Recommended screening Test or procedure Last done Next Scheduled    Breast self awareness- starting at age 18. Women should be familiar with their breasts and promptly report changes to their care provider. Periodic, consistent self exam may facilitate breast self awareness.    3/30/2018   March 2019   Breast screening, starting at age 25 Clinical breast exams every 6 -12 months 3/30/2018 March 2019   Breast screening   Age 25-29 Annual breast MRI screening with contrast (preferred) or mammogram if MRI is unavailable or individualized based on family history if a breast cancer diagnosis before age 30 is present.       Breast MRI is performed preferably on day 7-15 of menstrual cycle for premenopausal women.     NA     NA   Breast screening   Age >30-75 years     Annual mammogram and   annual breast MRI, preferably on day 7-15 of menstrual cycle for premenopausal women.    Age>75 years, management should be considered on an individual basis. 9/15/2017 - Breast MRI, BiRads1    3/10/2017- Screening tomosynthesis mammogram, BiRads1 NEXT: Tomosynthesis mammogram after appt today    NEXT: Breast MRI in Sept (no sooner than 9/16)   Ovarian cancer screening, starting at age 30-35 Consider transvaginal ultrasound and  tests. 9/15/2017- Pelvic US, simple cyst of left ovary    3/30/2018 - Pelvic US and  today NEXT:  Sept. for both screenings   Recommendation: risk-reducing  salpingo-oophorectomy, typically between 35 and 40 years old and  upon completion of child bearing.     Because ovarian cancer onset in patients with BRCA2 mutations is on average of 8-10 years later than in patients with BRCA1 mutations, it is reasonable to delay risk-reducing salpingo-oophorectomy until 40-45 years in patients with BRCA2 mutations who have already maximized their breast cancer prevention (i.e., undergone bilateral mastectomy).    Salpingectomy alone is not the standard of care for risk reduction although clinical trials are ongoing.     Discuss option of risk-reducing mastectomy.    Consider risks and benefits of risk reducing agents, such as tamoxifen and raloxifene for breast and ovarian cancer.    Consider a full body skin and eye exam for melanoma screening for both BRCA1+ and BRCA2+.     NOTE: Women with BRCA mutation who are treated for breast cancer should have screening of the remaining breast tissue with annual mammography and breast MRI.    The International Cancer of the Pancreas Screening (CAPS) Consortium recommends that individuals with a BRCA2 mutation who have at least one first-degree relative with pancreatic cancer should undergo initial screening with endoscopic ultrasonography and/or MRI/magnetic resonance cholangiopancreatography.                 Follow-ups after your visit        Follow-up notes from your care team     Return in about 1 year (around 3/30/2019) for Physical Exam.      Your next 10 appointments already scheduled     Sep 28, 2018  8:15 AM CDT   Lab with  LAB   [facility] ([facility])    [address] [place] [address] [id number] [phone]            Sep 28, 2018  8:30 AM CDT   (Arrive by 8:15 AM)   MR BREAST BILATERAL W/O & W CONTRAST with [facility] ([facility])    [address] [place] [address] [id number] [phone]           Take your  medicines as usual, unless your doctor tells you not to. Bring a list of your current medicines to your exam (including vitamins, minerals and over-the-counter drugs).  The timing of your exam may depend on the start of your last period. If you re in menopause, you may have the exam anytime.  Please bring any previous mammograms or breast MRIs from other facilities to the MRI dept. Do not mail these items to us.   You will have IV contrast for this exam.  You do not need to do anything special to prepare.  The MRI machine uses a strong magnet. Please wear clothes without metal (snaps, zippers). A sweatsuit works well, or we may give you a hospital gown.  Please remove any body piercings and hair extensions before you arrive. You will also remove watches, jewelry, hairpins, wallets, dentures, partial dental plates and hearing aids. You may wear contact lenses, and you may be able to wear your rings. We have a safe place to keep your personal items, but it is safer to leave them at home.  **IMPORTANT** THE INSTRUCTIONS BELOW ARE ONLY FOR THOSE PATIENTS WHO HAVE BEEN PRESCRIBED SEDATION OR GENERAL ANESTHESIA DURING THEIR MRI PROCEDURE:  IF YOUR DOCTOR PRESCRIBED ORAL SEDATION (take medicine to help you relax during your exam):   You must get the medicine from your doctor (oral medication) before you arrive. Bring the medicine to the exam. Do not take it at home. You ll be told when to take it upon arriving for your exam.   Arrive one hour early. Bring someone who can take you home after the test. Your medicine will make you sleepy. After the exam, you may not drive, take a bus or take a taxi by yourself.  IF YOUR DOCTOR PRESCRIBED IV SEDATION:   Arrive one hour early. Bring someone who can take you home after the test. Your medicine will make you sleepy. After the exam, you may not drive, take a bus or take a taxi by yourself.   No eating 6 hours before your exam. You may have clear liquids up until 4 hours before your  exam. (Clear liquids include water, clear tea, black coffee and fruit juice without pulp.)  IF YOUR DOCTOR PRESCRIBED ANESTHESIA (be asleep for your exam):   Arrive 1 1/2 hours early. Bring someone who can take you home after the test. You may not drive, take a bus or take a taxi by yourself.   No eating 8 hours before your exam. You may have clear liquids up until 4 hours before your exam. (Clear liquids include water, clear tea, black coffee and fruit juice without pulp.)   You will spend four to five hours in the recovery room.  If you have any questions, please contact your Imaging Department exam site.            Sep 28, 2018  9:15 AM CDT   US PELVIC COMPLETE W TRANSVAGINAL with UCUS1   [facility]  ([facility])    [address] [address] [phone]           Please bring a list of your medicines (including vitamins, minerals and over-the-counter drugs). Also, tell your doctor about any allergies you may have. Wear comfortable clothes and leave your valuables at home.  Adults: Drink six 8-ounce glasses of fluid one hour before your exam. Do NOT empty your bladder.  If you need to empty your bladder before your exam, try to release only a little bit of urine. Then, drink another 8oz glass of fluid.  Children: Children who are potty trained should drink at least 4 cups (32 oz) of liquid 45 minutes to one hour prior to the exam. The child s bladder must be full in order to achieve a diagnostic exam. If your child is very uncomfortable or has an urgent need to pee, please notify a technologist; they will try to find out how much longer the wait may be and provide instructions to help relieve the pressure. Occasionally it is medically necessary to insert a urinary catheter to fill the bladder.  Please call the Imaging Department at your exam site with any questions.            Mar 29, 2019 10:00 AM CDT   (Arrive by 9:45 AM)   Return Visit  "with HENRIQUE Angela   [facility] ([facility])    [address]  [address]  [facility] [address] [phone]              Future tests that were ordered for you today     Open Standing Orders        Priority Remaining Interval Expires Ordered    US Pelvic Complete with Transvaginal Routine 3/3 6 3/30/2019 3/30/2018            Who to contact     If you have questions or need follow up information about today's clinic visit or your schedule please contact [facility] directly at [phone].  Normal or non-critical lab and imaging results will be communicated to you by [employer], letter or phone within 4 business days after the clinic has received the results. If you do not hear from us within 7 days, please contact the clinic through "[employer]t or phone. If you have a critical or abnormal lab result, we will notify you by phone as soon as possible.  Submit refill requests through [employer] or call your pharmacy and they will forward the refill request to us. Please allow 3 business days for your refill to be completed.          Additional Information About Your Visit        [employer] Information     [employer] gives you secure access to your electronic health record. If you see a primary care provider, you can also send messages to your care team and make appointments. If you have questions, please call your primary care clinic.  If you do not have a primary care provider, please call [phone] and they will assist you.        Care EveryWhere ID     This is your Care EveryWhere ID. This could be used by other organizations to access your [place] medical records  [id number]        Your Vitals Were     Pulse Temperature Respirations Height Pulse Oximetry BMI (Body Mass Index)    68 98.2  F (36.8  C) (Oral) 16 1.6 m (5' 2.99\") 99% 25.16 kg/m2       Blood Pressure from Last 3 Encounters:   03/30/18 114/80   09/29/17 119/80 "   03/10/17 122/82    Weight from Last 3 Encounters:   03/30/18 64.4 kg (142 lb)   09/29/17 62.5 kg (137 lb 12.8 oz)   03/10/17 62.1 kg (136 lb 14.4 oz)               Primary Care Provider Office Phone # Fax #    Aspen Renteria [phone]       [facility] [address] [place] [facility] [address]        Equal Access to Services     AMANDA [place] : Hadii aad ku hadasho Soomaali, waaxda luqadaha, qaybta kaalmada adeegyada, waxay idiin hayaan adeeg kharash latalya kwon. So [facility] [phone].    ATENCIÓN: Si habla español, tiene a mclaughlin disposición servicios gratuitos de asistencia lingüística. [facility] [phone].    We comply with applicable federal civil rights laws and [place] laws. We do not discriminate on the basis of race, color, national origin, age, disability, sex, sexual orientation, or gender identity.            Thank you!     Thank you for choosing [facility]  for your care. Our goal is always to provide you with excellent care. Hearing back from our patients is one way we can continue to improve our services. Please take a few minutes to complete the written survey that you may receive in the mail after your visit with us. Thank you!             Your Updated Medication List - Protect others around you: Learn how to safely use, store and throw away your medicines at www.disposemymeds.org.          This list is accurate as of 3/30/18 10:41 AM.  Always use your most recent med list.                   Brand Name Dispense Instructions for use Diagnosis    buPROPion 100 MG tablet    WELLBUTRIN     Take 100 mg by mouth 2 times daily        fluticasone 50 MCG/ACT spray    FLONASE     Spray 1 spray into both nostrils daily        ibuprofen 800 MG tablet    ADVIL/MOTRIN     Take by mouth as needed Reported on 3/10/2017        levonorgestrel 20 MCG/24HR IUD    MIRENA          M.V.I. ADULT IV      Take 1 tablet by mouth daily        tretinoin 0.05 % cream    RETIN-A     Apply  topically twice a week        vitamin D 2000 UNITS tablet      Take 2,000 Units by mouth daily        zolpidem 5 MG tablet    AMBIEN     Take 5 mg by mouth nightly as needed

## 2018-09-27 DIAGNOSIS — Z15.09 BRCA2 POSITIVE: Primary | ICD-10-CM

## 2018-09-27 DIAGNOSIS — Z80.41 FAMILY HISTORY OF MALIGNANT NEOPLASM OF OVARY: ICD-10-CM

## 2018-09-27 DIAGNOSIS — Z15.01 BRCA2 POSITIVE: Primary | ICD-10-CM

## 2018-09-27 DIAGNOSIS — Z91.89 AT RISK FOR CANCER: ICD-10-CM

## 2018-09-28 ENCOUNTER — RADIANT APPOINTMENT (OUTPATIENT)
Dept: ULTRASOUND IMAGING | Facility: CLINIC | Age: 43
End: 2018-09-28
Attending: CLINICAL NURSE SPECIALIST
Payer: COMMERCIAL

## 2018-09-28 ENCOUNTER — HOSPITAL ENCOUNTER (OUTPATIENT)
Facility: CLINIC | Age: 43
Setting detail: SPECIMEN
Discharge: HOME | End: 2018-09-28
Admitting: CLINICAL NURSE SPECIALIST
Payer: COMMERCIAL

## 2018-09-28 ENCOUNTER — RADIANT APPOINTMENT (OUTPATIENT)
Dept: MRI IMAGING | Facility: CLINIC | Age: 43
End: 2018-09-28
Attending: CLINICAL NURSE SPECIALIST
Payer: COMMERCIAL

## 2018-09-28 DIAGNOSIS — Z80.3 FAMILY HISTORY OF MALIGNANT NEOPLASM OF BREAST: ICD-10-CM

## 2018-09-28 DIAGNOSIS — Z12.39 ENCOUNTER FOR BREAST CANCER SCREENING OTHER THAN MAMMOGRAM: ICD-10-CM

## 2018-09-28 DIAGNOSIS — Z80.41 FAMILY HISTORY OF MALIGNANT NEOPLASM OF OVARY: ICD-10-CM

## 2018-09-28 DIAGNOSIS — Z15.09 BRCA2 POSITIVE: ICD-10-CM

## 2018-09-28 DIAGNOSIS — Z15.01 BRCA2 POSITIVE: ICD-10-CM

## 2018-09-28 DIAGNOSIS — Z91.89 AT RISK FOR CANCER: ICD-10-CM

## 2018-09-28 LAB — CANCER AG125 SERPL-ACNC: 8 U/ML (ref 0–30)

## 2018-09-28 PROCEDURE — 86304 IMMUNOASSAY TUMOR CA 125: CPT | Performed by: CLINICAL NURSE SPECIALIST

## 2018-09-28 PROCEDURE — 36415 COLL VENOUS BLD VENIPUNCTURE: CPT | Performed by: CLINICAL NURSE SPECIALIST

## 2018-09-28 RX ORDER — GADOBUTROL 604.72 MG/ML
7.5 INJECTION INTRAVENOUS ONCE
Status: COMPLETED | OUTPATIENT
Start: 2018-09-28 | End: 2018-09-28

## 2018-09-28 RX ADMIN — GADOBUTROL 7.5 ML: 604.72 INJECTION INTRAVENOUS at 08:53

## 2018-09-28 NOTE — DISCHARGE INSTRUCTIONS
MRI Contrast Discharge Instructions    The IV contrast you received today will pass out of your body in your  urine. This will happen in the next 24 hours. You will not feel this process.  Your urine will not change color.    Drink at least 4 extra glasses of water or juice today (unless your doctor  has restricted your fluids). This reduces the stress on your kidneys.  You may take your regular medicines.    If you are on dialysis: It is best to have dialysis today.    If you have a reaction: Most reactions happen right away. If you have  any new symptoms after leaving the hospital (such as hives or swelling),  call your hospital at the correct number below. Or call your family doctor.  If you have breathing distress or wheezing, call 911.    Special instructions: ***    I have read and understand the above information.    Signature:______________________________________ Date:___________    Staff:__________________________________________ Date:___________     Time:__________    [place] [facility] Departments:    ___[place]: [phone]  ___[facility]: [phone]  ___[place]: [phone] ___[facility]: [phone]  ___[facility]: [phone]  ___[facility]: [phone]  ___[phone]  ___[facility]: [phone]  ___[phone]

## 2018-11-01 DIAGNOSIS — Z15.09 BRCA2 POSITIVE: Primary | ICD-10-CM

## 2018-11-01 DIAGNOSIS — Z12.39 BREAST CANCER SCREENING, HIGH RISK PATIENT: ICD-10-CM

## 2018-11-01 DIAGNOSIS — Z15.01 BRCA2 POSITIVE: Primary | ICD-10-CM

## 2019-04-10 NOTE — PROGRESS NOTES
Oncology Risk Management Consultation:  Date on this visit: 2019    [facility] follow up oncology risk management consultation.     She requires screening and surveillance to minimize her risk of cancer secondary to having a deleterious BRCA2 mutation.  She is considered to be at high risk for hereditary breast and ovarian cancer.    Primary Physician: HENRIQUE Kaye-NP    History Of Present Illness:  Ms. Baker is a very pleasant, healthy 43 year old female who presents with family history of breast and ovarian cancer and a personal diagnosis of a BRCA2 mutation.     Genetic testin2016 - POSITIVE for a BRCA2 mutation, specifically [id number], found on single site analysis genetic testing through [employer]. The testing was done because of a known genetic mutation in the family.     Pertinent history:  Menarche at age 11  First child at age 30  Hx of breastfeeding x 6-8 months  Breast density: heterogeneously fibroglandular tissue.  Currently using the Mirena IUD (inserted in , due for exchange in )  LMP: unknown  Reports a 15-20 year history of oral contraceptive use and short term use of Nuvaring and Provera.  Her only past history with breast problems was an episode of mastitis when she was breastfeeding her children  No history of breast biopsy, dysplasia, hyperplasia or malignancy.    Screening history:  2014, Screening, mammogram, [email or web address].   3/11/2016 - Screening mammogram, BiRads1  2016 - Breast MRI, BiRads1.  3/10/2017 - Screening tomosynthesis mammogram, BiRads1.  9/15/2017 - Breast MRI, BiRads1.  3/30/2018 - Screening tomosynthesis mammogram, BiRads1.  2018 - Breast MRI, BiRads1.    Ovarian screening history:  3/11/2016 - Pelvic US normal; :10 (normal)  2016 - Pelvic US normal; : 8 (normal)  3/10/ 2017 - R ovarian cyst   2017 - Pelvic US, R hemorrhagic cyst resolved.   9/15/2017 - Pelvic US, Simple cyst  on L ovary,  Measuring up to 4 cm.  12/20/2017 - Pelvic US, Simple left ovarian cyst measuring up to 2.6 cm reduced in size compared to last examination indicating a physiologic dominant follicle.   3/30/2018 - Pelvic US, Unchanged 2.5 cm left ovarian cyst with no septations, vascularity or solid components.  Otherwise normal pelvic ultrasound with IUD in appropriate  position.  9/28/2018 - Pelvic US, non enlarged L ovary not well visualized, otherwise, normal.     At this visit, she reports feeling well. She denies abdominal pain, bloating, early satiety, unusual vaginal discharge/bleeding, urinary symptoms. She denies breast pain, new fatigue, lumps, masses, thickenings, nipple discharge, dimpling and changes in symmetry or skin of her breasts.    Past Medical/Surgical History:  Past Medical History:   Diagnosis Date     At high risk for breast cancer 01/12/2016    BRCA2+     At risk for cancer 01/12/2016    at risk for ovarian/fallopian tube cancer, BRCA2+     BRCA2 positive 01/12/2016    [id number]; single site analysis through [employer]     Mastitis in female 2010     History reviewed. No pertinent surgical history.    Allergies:  Allergies as of 04/12/2019 - Reviewed 04/12/2019   Allergen Reaction Noted     Dust mites Other (See Comments) 03/04/2016       Current Medications:  Current Outpatient Medications   Medication Sig Dispense Refill     buPROPion (WELLBUTRIN) 100 MG tablet Take 100 mg by mouth 2 times daily        Cholecalciferol (VITAMIN D) 2000 UNITS tablet Take 2,000 Units by mouth daily        fluticasone (FLONASE) 50 MCG/ACT nasal spray Spray 1 spray into both nostrils daily        ibuprofen (ADVIL,MOTRIN) 800 MG tablet Take by mouth as needed Reported on 3/10/2017       levonorgestrel (MIRENA) 20 MCG/24HR IUD        Multiple Vitamin (M.V.I. ADULT IV) Take 1 tablet by mouth daily        tretinoin (RETIN-A) 0.05 % cream Apply topically twice a week           Family History:  Family  History   Problem Relation Age of Onset     Hereditary Breast and Ovarian Cancer Syndrome Mother         BRCA2+ (same genetic mutation), no cancer     Ovarian Cancer Maternal Grandmother 59         at 61     Pancreatic Cancer Maternal Grandfather 87         at 87     Skin Cancer Paternal Grandmother 88     Bladder Cancer Paternal Grandfather 60         at 80     Hereditary Breast and Ovarian Cancer Syndrome Maternal Uncle         BRCA2+, no cancer     Breast Cancer Maternal Aunt 51        BRCA2+     Hereditary Breast and Ovarian Cancer Syndrome Cousin         BRCA2+       Social History:  Social History     Socioeconomic History     Marital status:      Spouse name: Tavares     Number of children: 2     Years of education: Not on file     Highest education level: Not on file   Occupational History     Occupation: Human resources     Employer: [employer]   Social Needs     Financial resource strain: Not on file     Food insecurity:     Worry: Not on file     Inability: Not on file     Transportation needs:     Medical: Not on file     Non-medical: Not on file   Tobacco Use     Smoking status: Never Smoker     Smokeless tobacco: Never Used   Substance and Sexual Activity     Alcohol use: Yes     Alcohol/week: 0.0 oz     Comment: 1 drink/week     Drug use: No     Sexual activity: Yes     Partners: Male     Birth control/protection: IUD     Comment: Mirena   Lifestyle     Physical activity:     Days per week: Not on file     Minutes per session: Not on file     Stress: Not on file   Relationships     Social connections:     Talks on phone: Not on file     Gets together: Not on file     Attends Worship service: Not on file     Active member of club or organization: Not on file     Attends meetings of clubs or organizations: Not on file     Relationship status: Not on file     Intimate partner violence:     Fear of current or ex partner: Not on file     Emotionally abused: Not on file      "Physically abused: Not on file     Forced sexual activity: Not on file   Other Topics Concern      Service Not Asked     Blood Transfusions Not Asked     Caffeine Concern No     Occupational Exposure No     Hobby Hazards No     Sleep Concern No     Stress Concern Yes     Weight Concern No     Special Diet No     Back Care No     Exercise Yes     Comment: works out with , does yoga 3-4x/week     Bike Helmet Not Asked     Seat Belt Yes     Self-Exams Yes     Parent/sibling w/ CABG, MI or angioplasty before 65F 55M? No   Social History Narrative     Not on file       Physical Exam:  /80 (BP Location: Left arm, Patient Position: Chair, Cuff Size: Adult Regular)   Pulse 66   Temp 98  F (36.7  C) (Oral)   Resp 14   Ht 1.6 m (5' 2.99\")   Wt 64.1 kg (141 lb 4.8 oz)   SpO2 99%   BMI 25.04 kg/m    Physical exam deferred.    Laboratory/Imaging Studies  Results for orders placed or performed in visit on 04/12/19   US Pelvic Complete with Transvaginal    Narrative    EXAMINATION: US PELVIC COMPLETE WITH TRANSVAGINAL, 4/12/2019 9:19 AM     COMPARISON: Ultrasound from 9/28/2018    HISTORY: BRCA Positive    TECHNIQUE: The pelvis was scanned in standard fashion with  transabdominal and transvaginal transducer(s) using both grey scale  and color Doppler techniques.    FINDINGS  The uterus measures 8.9 x 4.6 x 4.5 cm, and there is no evidence of a  focal fibroid.  The endometrium is within normal limits and measures  3.8 mm. IUD in place. There is no free fluid in the pelvis.    The right ovary measures 4.8 x 3.2 x 4.8 and the left ovary measures  1.5 x 1.2 x 1.9. The left ovary is only well visualized on the  transabdominal view likely given the cranial positioning, however is  small in size without visualized mass. There are 2 discrete cysts in  the right ovary, increased from prior, one measuring up to 3.9 cm, and  the other measuring up to 2.7 cm. Previously adjacent cysts in a  similar " location measured up to 1.5, and 1.2 cm, respectively. No  appreciable mural nodularity. There is no adnexal mass. There is  normal blood flow to the ovaries.        Impression    IMPRESSION:   1.  Right ovarian simple appearing cystic structures measuring up to  3.9 cm, potentially dominant follicles/functional cysts, though  adjacent smaller cysts in a similar location were seen on 9/28/2018.  Given this patient's BRCA positivity, follow-up ultrasound in 4-6  weeks should be considered.  2.  Well positioned intrauterine device.    I have personally reviewed the examination and initial interpretation  and I agree with the findings.    CHERY WILLIAM, DO       ASSESSMENT  We reviewed her pelvic ultrasound and looked at her pelvic ultrasound records since 2014 from [facility]. She has a long term history of right ovarian cysts ranging from complex cysts to simple and has had multiple pelvic ultrasounds to follow up. We discussed that she could have a follow up ultrasound in 6 weeks; she declined, and elected to proceed with her standard follow up in October.    We spent the course of our time reviewing the recommendation for a bilateral salpingo oophorectomy, as she will be 44 in September.  She is interested in having the mesh bladder repair procedure in conjunction with an RRSO rather than the option of a salpingectomy at this time and deferring the oophorectomy. She has concerns about mortality and early menopause. Elkin et al 2015 notes that estrogen therapy may have a mitigating effect on the loss of estrogen at least for women through the age of 50, and they did not experience increased risk for dementia.   Edwin et al 2018 reviewed four studies in a meta analysis  relevant to the use of HRT in BRCA1/2 carriers and found that HRT was not associated with a significant risk increase for breast cancer among BRCA carriers who had undergone bilateral salpingo oophorectomy in women who had taken estrogen or  progesterone and estrogen.   We discussed that she could work with Dr. Murillo regarding this and that she could likely have short term use of HRT (until about age 5) but that non-hormonal options may be the most useful first-line therapy for menopausal symptoms and osteoporosis prevention and treatment in women who have undergone risk reducing bilateral salpingo oophorectomy. Hormone replacement is a reasonable option for women who are not adequately treated with non-hormonal approaches and who are willing to accept a potentially increased risk of breast cancer. It may be that she could take hormone replacement therapy after surgery with  a plan  to taper it to stop at age 50- 51 years old, the average age of natural menopause.     At this point, she will proceed with the following screening plan and revisit the timing of an RRSO in the Fall.    INDIVIDUALIZED CANCER RISK MANAGEMENT PLAN:  Individualized Surveillance Plan for women  Hereditary Breast and/or Ovarian Cancer Syndrome   Per NCCN Guidelines Version 3.2019   Recommended screening Test or procedure Last done Next Scheduled    Breast self awareness- starting at age 18. Women should be familiar with their breasts and promptly report changes to their care provider. Periodic, consistent self exam may facilitate breast self awareness. Premenopausal women may find self exams to be most informative when performed at the end of menses.   4/12/2019 April 2020   Breast screening, starting at age 25 Clinical breast exams every 6 -12 months 4/12/2019 April 2020   Breast screening   Age 25-29 Annual breast MRI screening with contrast (or mammogram if MRI is unavailable) or individualized based on family history if a breast cancer diagnosis before age 30 is present.       Breast MRI is performed preferably on day 7-15 of menstrual cycle for premenopausal women. See below See below   Breast screening   Age >30-75 years     Annual mammogram (consider tomosynthesis  mammogram) and annual screening MRI.     Breast MRI is performed preferably on day 7-15 of menstrual cycle for premenopausal women.    Age>75 years, management should be considered on an individual basis. 3/30/2018 - Screening tomosynthesis mammogram, BiRads1.    9/28/2018 - Breast MRI, BiRads1. Mammogram after appt today.     Breast MRI: October 2019    Next exam: April 2020 followed by tomosynthesis mammogram.    Ovarian cancer screening, starting at age 30-35 Consider transvaginal ultrasound and  tests. 4/12/2019 -  Pelvic US and  October 2019, then again in April 2020   Recommendation: risk-reducing salpingo-oophorectomy(RRSO), typically between 35 and 40 years old and  upon completion of child bearing.     Because ovarian cancer onset in patients with BRCA2 mutations is on average of 8-10 years later than in patients with BRCA1 mutations, it is reasonable to delay RRSO until 40-45 years in patients with BRCA2 mutations  unless age at diagnosis in the family warrants earlier age for consideration for prophylactic surgery.    Limited data suggest that there may be a slightly increased risk of serous uterine cancer among women with BRCA1+ pathogenic variants. The provider and the patient should discuss the risks and benefits of a concurrent hysterectomy at the time of RRSO for women with a BRCA1+ pathogenic variant /like pathogenic variant prior to surgery.     Salpingectomy alone is not the standard of care for risk reduction although clinical trials are ongoing.     Discuss option of risk-reducing mastectomy.    Consider risks and benefits of risk reducing agents, such as tamoxifen and raloxifene for breast and ovarian cancer.    Consider a full body skin and eye exam for melanoma screening for both BRCA1+ and BRCA2+.     NOTE: Women with BRCA mutation who are treated for breast cancer should have screening of the remaining breast tissue with annual mammography and breast MRI.    The International  Cancer of the Pancreas Screening (CAPS) Consortium recommends that individuals with a BRCA2 mutation who have at least one first-degree relative with pancreatic cancer should undergo initial screening with endoscopic ultrasonography and/or MRI/magnetic resonance cholangiopancreatography.       I spent 26 minutes with the patient with greater that 50% of it in counseling and coordinating care as documented above.    Meliza Najera, APRN-CNS, OCN, AGN-BC  Clinical Nurse Specialist  Cancer Risk Management Program  [address]  [place] [address]    phone:  [phone]  Pager: [phone]  fax: [phone]    CC:  MD Celeste Kaye MD

## 2019-04-12 ENCOUNTER — ONCOLOGY VISIT (OUTPATIENT)
Dept: ONCOLOGY | Facility: CLINIC | Age: 44
End: 2019-04-12
Attending: CLINICAL NURSE SPECIALIST
Payer: COMMERCIAL

## 2019-04-12 ENCOUNTER — ANCILLARY PROCEDURE (OUTPATIENT)
Dept: MAMMOGRAPHY | Facility: CLINIC | Age: 44
End: 2019-04-12
Attending: CLINICAL NURSE SPECIALIST
Payer: COMMERCIAL

## 2019-04-12 ENCOUNTER — ANCILLARY PROCEDURE (OUTPATIENT)
Dept: ULTRASOUND IMAGING | Facility: CLINIC | Age: 44
End: 2019-04-12
Attending: CLINICAL NURSE SPECIALIST
Payer: COMMERCIAL

## 2019-04-12 ENCOUNTER — APPOINTMENT (OUTPATIENT)
Dept: LAB | Facility: CLINIC | Age: 44
End: 2019-04-12
Attending: CLINICAL NURSE SPECIALIST
Payer: COMMERCIAL

## 2019-04-12 VITALS
RESPIRATION RATE: 14 BRPM | SYSTOLIC BLOOD PRESSURE: 148 MMHG | BODY MASS INDEX: 25.04 KG/M2 | TEMPERATURE: 98 F | WEIGHT: 141.3 LBS | DIASTOLIC BLOOD PRESSURE: 80 MMHG | OXYGEN SATURATION: 99 % | HEART RATE: 66 BPM | HEIGHT: 63 IN

## 2019-04-12 DIAGNOSIS — Z91.89 AT RISK FOR CANCER: ICD-10-CM

## 2019-04-12 DIAGNOSIS — Z15.09 BRCA2 POSITIVE: ICD-10-CM

## 2019-04-12 DIAGNOSIS — R92.30 DENSE BREAST: ICD-10-CM

## 2019-04-12 DIAGNOSIS — N83.201 CYST OF RIGHT OVARY: Primary | ICD-10-CM

## 2019-04-12 DIAGNOSIS — Z15.01 BRCA2 POSITIVE: ICD-10-CM

## 2019-04-12 DIAGNOSIS — Z12.39 BREAST CANCER SCREENING, HIGH RISK PATIENT: ICD-10-CM

## 2019-04-12 DIAGNOSIS — Z80.41 FAMILY HISTORY OF MALIGNANT NEOPLASM OF OVARY: ICD-10-CM

## 2019-04-12 PROBLEM — G56.00 CTS (CARPAL TUNNEL SYNDROME): Status: ACTIVE | Noted: 2018-10-07

## 2019-04-12 LAB — CANCER AG125 SERPL-ACNC: 9 U/ML (ref 0–30)

## 2019-04-12 PROCEDURE — 99214 OFFICE O/P EST MOD 30 MIN: CPT | Mod: ZP | Performed by: CLINICAL NURSE SPECIALIST

## 2019-04-12 PROCEDURE — G0463 HOSPITAL OUTPT CLINIC VISIT: HCPCS | Mod: ZF

## 2019-04-12 PROCEDURE — 86304 IMMUNOASSAY TUMOR CA 125: CPT | Performed by: CLINICAL NURSE SPECIALIST

## 2019-04-12 PROCEDURE — 36415 COLL VENOUS BLD VENIPUNCTURE: CPT

## 2019-04-12 ASSESSMENT — MIFFLIN-ST. JEOR: SCORE: 1259.93

## 2019-04-12 ASSESSMENT — PAIN SCALES - GENERAL: PAINLEVEL: NO PAIN (0)

## 2019-04-12 NOTE — PATIENT INSTRUCTIONS
Individualized Surveillance Plan for women  Hereditary Breast and/or Ovarian Cancer Syndrome   Per NCCN Guidelines Version 3.2019   Recommended screening Test or procedure Last done Next Scheduled    Breast self awareness- starting at age 18. Women should be familiar with their breasts and promptly report changes to their care provider. Periodic, consistent self exam may facilitate breast self awareness. Premenopausal women may find self exams to be most informative when performed at the end of menses.   4/12/2019 April 2020   Breast screening, starting at age 25 Clinical breast exams every 6 -12 months 4/12/2019 April 2020   Breast screening   Age 25-29 Annual breast MRI screening with contrast (or mammogram if MRI is unavailable) or individualized based on family history if a breast cancer diagnosis before age 30 is present.       Breast MRI is performed preferably on day 7-15 of menstrual cycle for premenopausal women. See below See below   Breast screening   Age >30-75 years     Annual mammogram (consider tomosynthesis mammogram) and annual screening MRI.     Breast MRI is performed preferably on day 7-15 of menstrual cycle for premenopausal women.    Age>75 years, management should be considered on an individual basis. 3/30/2018 - Screening tomosynthesis mammogram, [email or web address].    9/28/2018 - Breast MRI, BiRads1. Mammogram after appt today.     Breast MRI: October 2019    Next exam: April 2020 followed by tomosynthesis mammogram.    Ovarian cancer screening, starting at age 30-35 Consider transvaginal ultrasound and  tests. 4/12/2019 -  Pelvic US and  October 2019, then again in April 2020   Recommendation: risk-reducing salpingo-oophorectomy(RRSO), typically between 35 and 40 years old and  upon completion of child bearing.     Because ovarian cancer onset in patients with BRCA2 mutations is on average of 8-10 years later than in patients with BRCA1 mutations, it is reasonable to delay RRSO until 40-45  years in patients with BRCA2 mutations  unless age at diagnosis in the family warrants earlier age for consideration for prophylactic surgery.    Limited data suggest that there may be a slightly increased risk of serous uterine cancer among women with BRCA1+ pathogenic variants. The provider and the patient should discuss the risks and benefits of a concurrent hysterectomy at the time of RRSO for women with a BRCA1+ pathogenic variant /like pathogenic variant prior to surgery.     Salpingectomy alone is not the standard of care for risk reduction although clinical trials are ongoing.     Discuss option of risk-reducing mastectomy.    Consider risks and benefits of risk reducing agents, such as tamoxifen and raloxifene for breast and ovarian cancer.    Consider a full body skin and eye exam for melanoma screening for both BRCA1+ and BRCA2+.     NOTE: Women with BRCA mutation who are treated for breast cancer should have screening of the remaining breast tissue with annual mammography and breast MRI.    The International Cancer of the Pancreas Screening (CAPS) Consortium recommends that individuals with a BRCA2 mutation who have at least one first-degree relative with pancreatic cancer should undergo initial screening with endoscopic ultrasonography and/or MRI/magnetic resonance cholangiopancreatography.

## 2019-04-12 NOTE — LETTER
Cancer Risk Management  Program Locations    [facility]  [facility]  [facility]  [facility]  Mailing Address  Cancer Risk Management Program  [facility]  [address] [facility] [address]  [place] [address]    New patient appointments  [phone]  2019    Debbie Baker  [address]  [facility] [address]      Dear Debbie,    It was a pleasure meeting with you today.  Below is a copy of my note from our visit, outlining your surveillance plan.      I look forward to seeing you in the future to coordinate your care and reduce your health risks. Please feel free to contact me if you have any questions or concerns.      Oncology Risk Management Consultation:  Date on this visit: 2019    Debbie Baker to the [facility] today for a follow up oncology risk management consultation.     She requires screening and surveillance to minimize her risk of cancer secondary to having a deleterious BRCA2 mutation.  She is considered to be at high risk for hereditary breast and ovarian cancer.    Primary Physician: HENRIQUE Kaye-NP    History Of Present Illness:  Ms. Baker is a very pleasant, healthy 43 year old female who presents with family history of breast and ovarian cancer and a personal diagnosis of a BRCA2 mutation.     Genetic testin2016 - POSITIVE for a BRCA2 mutation, specifically [id number], found on single site analysis genetic testing through [employer]. The testing was done because of a known genetic mutation in the family.     Pertinent history:  Menarche at age 11  First child at age 30  Hx of breastfeeding x 6-8 months  Breast density: heterogeneously fibroglandular tissue.  Currently using the Mirena IUD (inserted in , due for exchange in )  LMP: unknown  Reports a 15-20 year history of oral contraceptive use  and short term use of Nuvaring and Provera.  Her only past history with breast problems was an episode of mastitis when she was breastfeeding her children  No history of breast biopsy, dysplasia, hyperplasia or malignancy.    Screening history:  11/17/2014, Screening, mammogram, BiRads1.   3/11/2016 - Screening mammogram, BiRads1  9/9/2016 - Breast MRI, BiRads1.  3/10/2017 - Screening tomosynthesis mammogram, BiRads1.  9/15/2017 - Breast MRI, BiRads1.  3/30/2018 - Screening tomosynthesis mammogram, BiRads1.  9/28/2018 - Breast MRI, BiRads1.    Ovarian screening history:  3/11/2016 - Pelvic US normal; :10 (normal)  9/9/2016 - Pelvic US normal; : 8 (normal)  3/10/ 2017 - R ovarian cyst   4/13/2017 - Pelvic US, R hemorrhagic cyst resolved.   9/15/2017 - Pelvic US, Simple cyst on L ovary,  Measuring up to 4 cm.  12/20/2017 - Pelvic US, Simple left ovarian cyst measuring up to 2.6 cm reduced in size compared to last examination indicating a physiologic dominant follicle.   3/30/2018 - Pelvic US, Unchanged 2.5 cm left ovarian cyst with no septations, vascularity or solid components.  Otherwise normal pelvic ultrasound with IUD in appropriate  position.  9/28/2018 - Pelvic US, non enlarged L ovary not well visualized, otherwise, normal.     At this visit, she reports feeling well. She denies abdominal pain, bloating, early satiety, unusual vaginal discharge/bleeding, urinary symptoms. She denies breast pain, new fatigue, lumps, masses, thickenings, nipple discharge, dimpling and changes in symmetry or skin of her breasts.    Past Medical/Surgical History:  Past Medical History:   Diagnosis Date     At high risk for breast cancer 01/12/2016    BRCA2+     At risk for cancer 01/12/2016    at risk for ovarian/fallopian tube cancer, BRCA2+     BRCA2 positive 01/12/2016    [id number]; single site analysis through [employer]     Mastitis in female 2010     History reviewed. No pertinent surgical  history.    Allergies:  Allergies as of 2019 - Reviewed 2019   Allergen Reaction Noted     Dust mites Other (See Comments) 2016       Current Medications:  Current Outpatient Medications   Medication Sig Dispense Refill     buPROPion (WELLBUTRIN) 100 MG tablet Take 100 mg by mouth 2 times daily        Cholecalciferol (VITAMIN D) 2000 UNITS tablet Take 2,000 Units by mouth daily        fluticasone (FLONASE) 50 MCG/ACT nasal spray Spray 1 spray into both nostrils daily        ibuprofen (ADVIL,MOTRIN) 800 MG tablet Take by mouth as needed Reported on 3/10/2017       levonorgestrel (MIRENA) 20 MCG/24HR IUD        Multiple Vitamin (M.V.I. ADULT IV) Take 1 tablet by mouth daily        tretinoin (RETIN-A) 0.05 % cream Apply topically twice a week           Family History:  Family History   Problem Relation Age of Onset     Hereditary Breast and Ovarian Cancer Syndrome Mother         BRCA2+ (same genetic mutation), no cancer     Ovarian Cancer Maternal Grandmother 59         at 61     Pancreatic Cancer Maternal Grandfather 87         at 87     Skin Cancer Paternal Grandmother 88     Bladder Cancer Paternal Grandfather 60         at 80     Hereditary Breast and Ovarian Cancer Syndrome Maternal Uncle         BRCA2+, no cancer     Breast Cancer Maternal Aunt 51        BRCA2+     Hereditary Breast and Ovarian Cancer Syndrome Cousin         BRCA2+       Social History:  Social History     Socioeconomic History     Marital status:      Spouse name: Tavares     Number of children: 2     Years of education: Not on file     Highest education level: Not on file   Occupational History     Occupation: Human resources     Employer: [employer]   Social Needs     Financial resource strain: Not on file     Food insecurity:     Worry: Not on file     Inability: Not on file     Transportation needs:     Medical: Not on file     Non-medical: Not on file   Tobacco Use     Smoking status: Never  "Smoker     Smokeless tobacco: Never Used   Substance and Sexual Activity     Alcohol use: Yes     Alcohol/week: 0.0 oz     Comment: 1 drink/week     Drug use: No     Sexual activity: Yes     Partners: Male     Birth control/protection: IUD     Comment: Mirena   Lifestyle     Physical activity:     Days per week: Not on file     Minutes per session: Not on file     Stress: Not on file   Relationships     Social connections:     Talks on phone: Not on file     Gets together: Not on file     Attends Advent service: Not on file     Active member of club or organization: Not on file     Attends meetings of clubs or organizations: Not on file     Relationship status: Not on file     Intimate partner violence:     Fear of current or ex partner: Not on file     Emotionally abused: Not on file     Physically abused: Not on file     Forced sexual activity: Not on file   Other Topics Concern      Service Not Asked     Blood Transfusions Not Asked     Caffeine Concern No     Occupational Exposure No     Hobby Hazards No     Sleep Concern No     Stress Concern Yes     Weight Concern No     Special Diet No     Back Care No     Exercise Yes     Comment: works out with , does yoga 3-4x/week     Bike Helmet Not Asked     Seat Belt Yes     Self-Exams Yes     Parent/sibling w/ CABG, MI or angioplasty before 65F 55M? No   Social History Narrative     Not on file       Physical Exam:  /80 (BP Location: Left arm, Patient Position: Chair, Cuff Size: Adult Regular)   Pulse 66   Temp 98  F (36.7  C) (Oral)   Resp 14   Ht 1.6 m (5' 2.99\")   Wt 64.1 kg (141 lb 4.8 oz)   SpO2 99%   BMI 25.04 kg/m     Physical exam deferred.    Laboratory/Imaging Studies  Results for orders placed or performed in visit on 04/12/19   US Pelvic Complete with Transvaginal    Narrative    EXAMINATION: US PELVIC COMPLETE WITH TRANSVAGINAL, 4/12/2019 9:19 AM     COMPARISON: Ultrasound from 9/28/2018    HISTORY: BRCA " Positive    TECHNIQUE: The pelvis was scanned in standard fashion with  transabdominal and transvaginal transducer(s) using both grey scale  and color Doppler techniques.    FINDINGS  The uterus measures 8.9 x 4.6 x 4.5 cm, and there is no evidence of a  focal fibroid.  The endometrium is within normal limits and measures  3.8 mm. IUD in place. There is no free fluid in the pelvis.    The right ovary measures 4.8 x 3.2 x 4.8 and the left ovary measures  1.5 x 1.2 x 1.9. The left ovary is only well visualized on the  transabdominal view likely given the cranial positioning, however is  small in size without visualized mass. There are 2 discrete cysts in  the right ovary, increased from prior, one measuring up to 3.9 cm, and  the other measuring up to 2.7 cm. Previously adjacent cysts in a  similar location measured up to 1.5, and 1.2 cm, respectively. No  appreciable mural nodularity. There is no adnexal mass. There is  normal blood flow to the ovaries.        Impression    IMPRESSION:   1.  Right ovarian simple appearing cystic structures measuring up to  3.9 cm, potentially dominant follicles/functional cysts, though  adjacent smaller cysts in a similar location were seen on 9/28/2018.  Given this patient's BRCA positivity, follow-up ultrasound in 4-6  weeks should be considered.  2.  Well positioned intrauterine device.    I have personally reviewed the examination and initial interpretation  and I agree with the findings.    CHERY WILLIAM, DO       ASSESSMENT  We reviewed her pelvic ultrasound and looked at her pelvic ultrasound records since 2014 from [facility]. She has a long term history of right ovarian cysts ranging from complex cysts to simple and has had multiple pelvic ultrasounds to follow up. We discussed that she could have a follow up ultrasound in 6 weeks; she declined, and elected to proceed with her standard follow up in October.    We spent the course of our time reviewing the  recommendation for a bilateral salpingo oophorectomy, as she will be 44 in September.  She is interested in having the mesh bladder repair procedure in conjunction with an RRSO rather than the option of a salpingectomy at this time and deferring the oophorectomy. She has concerns about mortality and early menopause. Elkin et al 2015 notes that estrogen therapy may have a mitigating effect on the loss of estrogen at least for women through the age of 50, and they did not experience increased risk for dementia.   Edwin et al 2018 reviewed four studies in a meta analysis  relevant to the use of HRT in BRCA1/2 carriers and found that HRT was not associated with a significant risk increase for breast cancer among BRCA carriers who had undergone bilateral salpingo oophorectomy in women who had taken estrogen or progesterone and estrogen.   We discussed that she could work with Dr. Murillo regarding this and that she could likely have short term use of HRT (until about age 5) but that non-hormonal options may be the most useful first-line therapy for menopausal symptoms and osteoporosis prevention and treatment in women who have undergone risk reducing bilateral salpingo oophorectomy. Hormone replacement is a reasonable option for women who are not adequately treated with non-hormonal approaches and who are willing to accept a potentially increased risk of breast cancer. It may be that she could take hormone replacement therapy after surgery with  a plan  to taper it to stop at age 50- 51 years old, the average age of natural menopause.     At this point, she will proceed with the following screening plan and revisit the timing of an RRSO in the Fall.    INDIVIDUALIZED CANCER RISK MANAGEMENT PLAN:  Individualized Surveillance Plan for women  Hereditary Breast and/or Ovarian Cancer Syndrome   Per NCCN Guidelines Version 3.2019   Recommended screening Test or procedure Last done Next Scheduled    Breast self awareness-  starting at age 18. Women should be familiar with their breasts and promptly report changes to their care provider. Periodic, consistent self exam may facilitate breast self awareness. Premenopausal women may find self exams to be most informative when performed at the end of menses.   4/12/2019 April 2020   Breast screening, starting at age 25 Clinical breast exams every 6 -12 months 4/12/2019 April 2020   Breast screening   Age 25-29 Annual breast MRI screening with contrast (or mammogram if MRI is unavailable) or individualized based on family history if a breast cancer diagnosis before age 30 is present.       Breast MRI is performed preferably on day 7-15 of menstrual cycle for premenopausal women. See below See below   Breast screening   Age >30-75 years     Annual mammogram (consider tomosynthesis mammogram) and annual screening MRI.     Breast MRI is performed preferably on day 7-15 of menstrual cycle for premenopausal women.    Age>75 years, management should be considered on an individual basis. 3/30/2018 - Screening tomosynthesis mammogram, BiRads1.    9/28/2018 - Breast MRI, BiRads1. Mammogram after appt today.     Breast MRI: October 2019    Next exam: April 2020 followed by tomosynthesis mammogram.    Ovarian cancer screening, starting at age 30-35 Consider transvaginal ultrasound and  tests. 4/12/2019 -  Pelvic US and  October 2019, then again in April 2020   Recommendation: risk-reducing salpingo-oophorectomy(RRSO), typically between 35 and 40 years old and  upon completion of child bearing.     Because ovarian cancer onset in patients with BRCA2 mutations is on average of 8-10 years later than in patients with BRCA1 mutations, it is reasonable to delay RRSO until 40-45 years in patients with BRCA2 mutations  unless age at diagnosis in the family warrants earlier age for consideration for prophylactic surgery.    Limited data suggest that there may be a slightly increased risk of serous  uterine cancer among women with BRCA1+ pathogenic variants. The provider and the patient should discuss the risks and benefits of a concurrent hysterectomy at the time of RRSO for women with a BRCA1+ pathogenic variant /like pathogenic variant prior to surgery.     Salpingectomy alone is not the standard of care for risk reduction although clinical trials are ongoing.     Discuss option of risk-reducing mastectomy.    Consider risks and benefits of risk reducing agents, such as tamoxifen and raloxifene for breast and ovarian cancer.    Consider a full body skin and eye exam for melanoma screening for both BRCA1+ and BRCA2+.     NOTE: Women with BRCA mutation who are treated for breast cancer should have screening of the remaining breast tissue with annual mammography and breast MRI.    The International Cancer of the Pancreas Screening (CAPS) Consortium recommends that individuals with a BRCA2 mutation who have at least one first-degree relative with pancreatic cancer should undergo initial screening with endoscopic ultrasonography and/or MRI/magnetic resonance cholangiopancreatography.       I spent 26 minutes with the patient with greater that 50% of it in counseling and coordinating care as documented above.    Meliza Najera, APRN-CNS, OCN, AGN-BC  Clinical Nurse Specialist  Cancer Risk Management Program  [address]  [place] [address]    phone:  [phone]  Pager: [phone]  fax: [phone]    CC:  MD Celeste Kaye MD

## 2019-04-12 NOTE — NURSING NOTE
"Oncology Rooming Note    April 12, 2019 10:30 AM   Debbie Baker is a 43 year old female who presents for:    Chief Complaint   Patient presents with     Blood Draw     Vitals taken, Labs drawn- purple and green JIC tubes draw, checked in for next appt by LPN     Oncology Clinic Visit     Breast Ca 2 Pos 1 Yr f\u     Initial Vitals: /80 (BP Location: Left arm, Patient Position: Chair, Cuff Size: Adult Regular)   Pulse 66   Temp 98  F (36.7  C) (Oral)   Resp 14   Ht 1.6 m (5' 2.99\")   Wt 64.1 kg (141 lb 4.8 oz)   SpO2 99%   BMI 25.04 kg/m   Estimated body mass index is 25.04 kg/m  as calculated from the following:    Height as of this encounter: 1.6 m (5' 2.99\").    Weight as of this encounter: 64.1 kg (141 lb 4.8 oz). Body surface area is 1.69 meters squared.  No Pain (0) Comment: Data Unavailable   No LMP recorded. (Menstrual status: IUD).  Allergies reviewed: Yes  Medications reviewed: Yes    Medications: Medication refills not needed today.  Pharmacy name entered into [id number] - [place], [place] - [address] [place] [place] [address] RD NE AT [facility]    Clinical concerns: No concerns  Dania was notified.      Nicole Scott MA              "

## 2019-04-12 NOTE — NURSING NOTE
Vitals taken, labs drawn- green and purple JIC tube, and checked in for next appt by KEIKO.    Orestes Lopez LPN

## 2019-05-21 ENCOUNTER — PRE VISIT (OUTPATIENT)
Dept: UROLOGY | Facility: CLINIC | Age: 44
End: 2019-05-21

## 2019-05-27 ASSESSMENT — ENCOUNTER SYMPTOMS
ABDOMINAL PAIN: 0
HEARTBURN: 0
BOWEL INCONTINENCE: 0
VOMITING: 0
BLOATING: 1
BLOOD IN STOOL: 0
JAUNDICE: 0
DIARRHEA: 0
NAUSEA: 0
CONSTIPATION: 1
RECTAL PAIN: 0

## 2019-05-29 ENCOUNTER — OFFICE VISIT (OUTPATIENT)
Dept: UROLOGY | Facility: CLINIC | Age: 44
End: 2019-05-29
Payer: COMMERCIAL

## 2019-05-29 ENCOUNTER — RESULTS ONLY (OUTPATIENT)
Dept: LAB | Age: 44
End: 2019-05-29

## 2019-05-29 ENCOUNTER — APPOINTMENT (OUTPATIENT)
Dept: LAB | Facility: CLINIC | Age: 44
End: 2019-05-29
Payer: COMMERCIAL

## 2019-05-29 VITALS — BODY MASS INDEX: 24.8 KG/M2 | WEIGHT: 140 LBS | HEIGHT: 63 IN

## 2019-05-29 DIAGNOSIS — N39.3 STRESS INCONTINENCE: Primary | ICD-10-CM

## 2019-05-29 LAB — HCG UR QL: NEGATIVE

## 2019-05-29 ASSESSMENT — PATIENT HEALTH QUESTIONNAIRE - PHQ9
SUM OF ALL RESPONSES TO PHQ QUESTIONS 1-9: 1
SUM OF ALL RESPONSES TO PHQ QUESTIONS 1-9: 1

## 2019-05-29 ASSESSMENT — MIFFLIN-ST. JEOR: SCORE: 1254.01

## 2019-05-29 ASSESSMENT — PAIN SCALES - GENERAL: PAINLEVEL: NO PAIN (0)

## 2019-05-29 NOTE — PROGRESS NOTES
"CC:stress incontinence    HPI:  Debbie Baker is a 41 year old female asked to be seen in consultation by Dr. Molina for urinary incontinence.  This problem has been going on since  and has been getting worse.  It is associated with laughing, sneezing, and any bending.  She has 2x episodes of incontinence per day and has been using a pessary for 2 years, she does not need a pads if she is using a pessary. She not tried Kegel exercises and has not tried pelvic floor PT in the past.     She has nocturia x 1.     The patient denies any frequency, urgency, or urge incontinence.  She denies any dysuria, , hematuria, pyuria, hesitency, intermittency, feeling of incomplete emptying, or any recent hx of UTI's.     The patient has no constipation or splinting.  She takes daily fiber.  She is sexually active and denies any dyspareunia or pelvic pain.   She denies any vaginal bulge.  She has Neurological or balance problems.      Obstetric Hx:  She is .  The weight of her largest baby was 7 lbs 8oz.  Babies were delivered by Vaginal. Vaginal tear in skin with first baby. 30 Hr labor with first baby     Periods are regular. She is on IUD. She is planning for bilateral oopherectomy with Dr. Molina for BRCA 2 positive. She has a family history of breast cancer and ovarian cancer.          Past Medical History:   Diagnosis Date     At high risk for breast cancer 2016    BRCA2+     At risk for cancer 2016    at risk for ovarian/fallopian tube cancer, BRCA2+     BRCA2 positive 2016    [id number]; single site analysis through [employer]     Mastitis in female        History reviewed. No pertinent surgical history.    Meds, Allergies, FHx and SHx reviewed per nurse's intake note.    ROS is below.  All other positive and pertinent information is mentioned in the HPI.    PEx:   Height 1.6 m (5' 2.99\"), weight 63.5 kg (140 lb), not currently breastfeeding.  5' 2.99\", Body mass index is 24.81 " kg/m ., 140 lbs 0 oz  Gen appearance:  Well groomed  HEENT:  EOMI, AT NC  Psych:  Normal Affect  Neuro:  A/O X 3  Skin:  Warm to touch  Resp:  No increased respiratory effort  Vasc:  RRR  lymph:  No LE edema  Abd:  Soft/NT, ND, no palpable masses  Musk:  Full ROM in extremities  :  Normal external genitalia and introitus, no atrophic changes          Urethra with hypermobility          Stress incontinence was not demonstrated with coughing          no cystocele and no rectocele          Pelvic floor muscles are of normal tonicity, non tender          Weakly able to localize pelvic floor muscles.  Perineum WNL.    UA: UA RESULTS:  Recent Labs   Lab Test 11/16/16  1000 11/16/16   COLOR Straw Yellow   APPEARANCE Clear Clear   URINEGLC Negative Neg   URINEBILI Negative Neg   URINEKETONE Negative Neg   SG 1.003 1.005   UBLD Moderate* Small   URINEPH 7.0 6.5   PROTEIN Negative Neg   UROBILINOGEN  --  0.2   NITRITE Negative Neg   LEUKEST Trace* Neg   RBCU 4*  --    WBCU 3*  --        Oncology Visit on 04/12/2019   Component Date Value Ref Range Status      04/12/2019 9  0 - 30 U/mL Final    Assay Method:  Chemiluminescence using Siemens Centaur XP     ASSESSMENT and PLAN:  This is a 43 year old female with stress urinary incontinence.  Different management options were discussed with the patient including observation, PT, pelvital study and midurethral sling.  The patient has elected to proceed with pelvital study  -enroll pt. Into study  -f/u per protocol.    Thank you for allowing me to participate in Ms. Baker's care.  I will keep you updated on her progress.    Pamela Chilel MD    Answers for HPI/ROS submitted by the patient on 5/27/2019   General Symptoms: No  Skin Symptoms: No  HENT Symptoms: No  EYE SYMPTOMS: No  HEART SYMPTOMS: No  LUNG SYMPTOMS: No  INTESTINAL SYMPTOMS: Yes  URINARY SYMPTOMS: No  GYNECOLOGIC SYMPTOMS: No  BREAST SYMPTOMS: No  SKELETAL SYMPTOMS: No  BLOOD SYMPTOMS: No  NERVOUS SYSTEM  SYMPTOMS: No  MENTAL HEALTH SYMPTOMS: No  Heart burn or indigestion: No  Nausea: No  Vomiting: No  Abdominal pain: No  Bloating: Yes  Constipation: Yes  Diarrhea: No  Blood in stool: No  Black stools: No  Rectal or Anal pain: No  Fecal incontinence: No  Yellowing of skin or eyes: No  Vomit with blood: No  Change in stools: No  PHQ9 TOTAL SCORE: 1

## 2019-05-29 NOTE — LETTER
2019       RE: Debbie Baker  [address]  [facility] [address]     Dear Colleague,    Thank you for referring your patient, Debbie Baker, to the [facility] UROLOGY AND INST FOR PROSTATE AND UROLOGIC CANCERS at [facility]. Please see a copy of my visit note below.    CC:stress incontinence    HPI:  Debbie Baker is a 41 year old female asked to be seen in consultation by Dr. Molina for urinary incontinence.  This problem has been going on since  and has been getting worse.  It is associated with laughing, sneezing, and any bending.  She has 2x episodes of incontinence per day and has been using a pessary for 2 years, she does not need a pads if she is using a pessary. She not tried Kegel exercises and has not tried pelvic floor PT in the past.     She has nocturia x 1.     The patient denies any frequency, urgency, or urge incontinence.  She denies any dysuria, , hematuria, pyuria, hesitency, intermittency, feeling of incomplete emptying, or any recent hx of UTI's.     The patient has no constipation or splinting.  She takes daily fiber.  She is sexually active and denies any dyspareunia or pelvic pain.   She denies any vaginal bulge.  She has Neurological or balance problems.      Obstetric Hx:  She is .  The weight of her largest baby was 7 lbs 8oz.  Babies were delivered by Vaginal. Vaginal tear in skin with first baby. 30 Hr labor with first baby     Periods are regular. She is on IUD. She is planning for bilateral oopherectomy with Dr. Molina for BRCA 2 positive. She has a family history of breast cancer and ovarian cancer.          Past Medical History:   Diagnosis Date     At high risk for breast cancer 2016    BRCA2+     At risk for cancer 2016    at risk for ovarian/fallopian tube cancer, BRCA2+     BRCA2 positive 2016    c.[id number]; single site analysis through [facility]     Mastitis in female        History reviewed. No  "pertinent surgical history.    Meds, Allergies, FHx and SHx reviewed per nurse's intake note.    ROS is below.  All other positive and pertinent information is mentioned in the HPI.    PEx:   Height 1.6 m (5' 2.99\"), weight 63.5 kg (140 lb), not currently breastfeeding.  5' 2.99\", Body mass index is 24.81 kg/m ., 140 lbs 0 oz  Gen appearance:  Well groomed  HEENT:  EOMI, AT NC  Psych:  Normal Affect  Neuro:  A/O X 3  Skin:  Warm to touch  Resp:  No increased respiratory effort  Vasc:  RRR  lymph:  No LE edema  Abd:  Soft/NT, ND, no palpable masses  Musk:  Full ROM in extremities  :  Normal external genitalia and introitus, no atrophic changes          Urethra with hypermobility          Stress incontinence was not demonstrated with coughing          no cystocele and no rectocele          Pelvic floor muscles are of normal tonicity, non tender          Weakly able to localize pelvic floor muscles.  Perineum WNL.    UA: UA RESULTS:  Recent Labs   Lab Test 11/16/16  1000 11/16/16   COLOR Straw Yellow   APPEARANCE Clear Clear   URINEGLC Negative Neg   URINEBILI Negative Neg   URINEKETONE Negative Neg   SG 1.003 1.005   UBLD Moderate* Small   URINEPH 7.0 6.5   PROTEIN Negative Neg   UROBILINOGEN  --  0.2   NITRITE Negative Neg   LEUKEST Trace* Neg   RBCU 4*  --    WBCU 3*  --        Oncology Visit on 04/12/2019   Component Date Value Ref Range Status      04/12/2019 9  0 - 30 U/mL Final    Assay Method:  Chemiluminescence using Siemens Centaur XP     ASSESSMENT and PLAN:  This is a 43 year old female with stress urinary incontinence.  Different management options were discussed with the patient including observation, PT, pelvital study and midurethral sling.  The patient has elected to proceed with pelvital study  -enroll pt. Into study  -f/u per protocol.    Thank you for allowing me to participate in Ms. Baker's care.  I will keep you updated on her progress.    Pamela Chilel MD    Answers for HPI/ROS " submitted by the patient on 5/27/2019   General Symptoms: No  Skin Symptoms: No  HENT Symptoms: No  EYE SYMPTOMS: No  HEART SYMPTOMS: No  LUNG SYMPTOMS: No  INTESTINAL SYMPTOMS: Yes  URINARY SYMPTOMS: No  GYNECOLOGIC SYMPTOMS: No  BREAST SYMPTOMS: No  SKELETAL SYMPTOMS: No  BLOOD SYMPTOMS: No  NERVOUS SYSTEM SYMPTOMS: No  MENTAL HEALTH SYMPTOMS: No  Heart burn or indigestion: No  Nausea: No  Vomiting: No  Abdominal pain: No  Bloating: Yes  Constipation: Yes  Diarrhea: No  Blood in stool: No  Black stools: No  Rectal or Anal pain: No  Fecal incontinence: No  Yellowing of skin or eyes: No  Vomit with blood: No  Change in stools: No  PHQ9 TOTAL SCORE: 1      Again, thank you for allowing me to participate in the care of your patient.      Sincerely,    Pamela Chilel MD

## 2019-05-29 NOTE — NURSING NOTE
"Chief Complaint   Patient presents with     Consult     LIZETH, Pelvital study       Height 1.6 m (5' 2.99\"), weight 63.5 kg (140 lb), not currently breastfeeding. Body mass index is 24.81 kg/m .    Patient Active Problem List   Diagnosis     Family history of malignant neoplasm of breast     BRCA2 positive     Allergic rhinitis     Seasonal affective disorder (H)     Family history of ovarian cancer     Family history of pancreatic cancer     At high risk for breast cancer     At risk for cancer     Depressive disorder     CTS (carpal tunnel syndrome)       Allergies   Allergen Reactions     Dust Mites Other (See Comments)       Current Outpatient Medications   Medication Sig Dispense Refill     buPROPion (WELLBUTRIN) 100 MG tablet Take 100 mg by mouth 2 times daily        Cholecalciferol (VITAMIN D) 2000 UNITS tablet Take 2,000 Units by mouth daily        fluticasone (FLONASE) 50 MCG/ACT nasal spray Spray 1 spray into both nostrils daily        ibuprofen (ADVIL,MOTRIN) 800 MG tablet Take by mouth as needed Reported on 3/10/2017       levonorgestrel (MIRENA) 20 MCG/24HR IUD        Multiple Vitamin (M.V.I. ADULT IV) Take 1 tablet by mouth daily        tretinoin (RETIN-A) 0.05 % cream Apply topically twice a week          Social History     Tobacco Use     Smoking status: Never Smoker     Smokeless tobacco: Never Used   Substance Use Topics     Alcohol use: Yes     Alcohol/week: 0.0 oz     Comment: 1 drink/week     Drug use: No       Fabiola Maher LPN  5/29/2019  2:10 PM  "

## 2019-05-30 ASSESSMENT — PATIENT HEALTH QUESTIONNAIRE - PHQ9: SUM OF ALL RESPONSES TO PHQ QUESTIONS 1-9: 1

## 2019-06-03 ENCOUNTER — APPOINTMENT (OUTPATIENT)
Dept: UROLOGY | Facility: CLINIC | Age: 44
End: 2019-06-03
Payer: COMMERCIAL

## 2019-06-17 ENCOUNTER — APPOINTMENT (OUTPATIENT)
Dept: UROLOGY | Facility: CLINIC | Age: 44
End: 2019-06-17

## 2019-07-01 ENCOUNTER — APPOINTMENT (OUTPATIENT)
Dept: UROLOGY | Facility: CLINIC | Age: 44
End: 2019-07-01

## 2019-07-11 ENCOUNTER — PRE VISIT (OUTPATIENT)
Dept: UROLOGY | Facility: CLINIC | Age: 44
End: 2019-07-11

## 2019-07-11 NOTE — TELEPHONE ENCOUNTER
Reason for Visit: Pelvital study follow up    Diagnosis: LIZETH    Orders/Procedures/Records: n/a    Contact Patient: n/a    Rooming Requirements: normal      Fabiola Maher LPN  07/11/19  2:27 PM

## 2019-07-15 ENCOUNTER — APPOINTMENT (OUTPATIENT)
Dept: UROLOGY | Facility: CLINIC | Age: 44
End: 2019-07-15
Payer: COMMERCIAL

## 2019-07-29 ENCOUNTER — APPOINTMENT (OUTPATIENT)
Dept: UROLOGY | Facility: CLINIC | Age: 44
End: 2019-07-29

## 2019-08-12 ENCOUNTER — APPOINTMENT (OUTPATIENT)
Dept: UROLOGY | Facility: CLINIC | Age: 44
End: 2019-08-12

## 2019-08-26 ENCOUNTER — APPOINTMENT (OUTPATIENT)
Dept: UROLOGY | Facility: CLINIC | Age: 44
End: 2019-08-26
Payer: COMMERCIAL

## 2019-09-24 ENCOUNTER — PRE VISIT (OUTPATIENT)
Dept: UROLOGY | Facility: CLINIC | Age: 44
End: 2019-09-24

## 2019-09-24 NOTE — TELEPHONE ENCOUNTER
Reason for Visit: Pelvital study follow up     Diagnosis: LIZETH     Orders/Procedures/Records: n/a     Contact Patient: n/a     Rooming Requirements: normal    Fabiola Maher LPN  09/24/19  11:36 AM

## 2019-10-02 ENCOUNTER — VIRTUAL VISIT (OUTPATIENT)
Dept: UROLOGY | Facility: CLINIC | Age: 44
End: 2019-10-02
Payer: COMMERCIAL

## 2019-10-02 DIAGNOSIS — N39.3 STRESS INCONTINENCE: Primary | ICD-10-CM

## 2019-10-24 ENCOUNTER — ANCILLARY PROCEDURE (OUTPATIENT)
Dept: ULTRASOUND IMAGING | Facility: CLINIC | Age: 44
End: 2019-10-24
Attending: CLINICAL NURSE SPECIALIST
Payer: COMMERCIAL

## 2019-10-24 ENCOUNTER — ANCILLARY PROCEDURE (OUTPATIENT)
Dept: MRI IMAGING | Facility: CLINIC | Age: 44
End: 2019-10-24
Attending: CLINICAL NURSE SPECIALIST
Payer: COMMERCIAL

## 2019-10-24 ENCOUNTER — HOSPITAL ENCOUNTER (OUTPATIENT)
Facility: CLINIC | Age: 44
Setting detail: SPECIMEN
Discharge: HOME | End: 2019-10-24
Admitting: CLINICAL NURSE SPECIALIST
Payer: COMMERCIAL

## 2019-10-24 DIAGNOSIS — Z15.09 BRCA2 POSITIVE: ICD-10-CM

## 2019-10-24 DIAGNOSIS — Z91.89 AT RISK FOR CANCER: ICD-10-CM

## 2019-10-24 DIAGNOSIS — R92.30 DENSE BREAST: ICD-10-CM

## 2019-10-24 DIAGNOSIS — Z15.01 BRCA2 POSITIVE: ICD-10-CM

## 2019-10-24 DIAGNOSIS — Z80.41 FAMILY HISTORY OF MALIGNANT NEOPLASM OF OVARY: ICD-10-CM

## 2019-10-24 DIAGNOSIS — N83.201 CYST OF RIGHT OVARY: ICD-10-CM

## 2019-10-24 DIAGNOSIS — Z12.39 BREAST CANCER SCREENING, HIGH RISK PATIENT: ICD-10-CM

## 2019-10-24 LAB — CANCER AG125 SERPL-ACNC: 39 U/ML (ref 0–30)

## 2019-10-24 PROCEDURE — 86304 IMMUNOASSAY TUMOR CA 125: CPT | Performed by: CLINICAL NURSE SPECIALIST

## 2019-10-24 PROCEDURE — 36415 COLL VENOUS BLD VENIPUNCTURE: CPT | Performed by: CLINICAL NURSE SPECIALIST

## 2019-10-24 RX ORDER — GADOBUTROL 604.72 MG/ML
7.5 INJECTION INTRAVENOUS ONCE
Status: COMPLETED | OUTPATIENT
Start: 2019-10-24 | End: 2019-10-24

## 2019-10-24 RX ADMIN — GADOBUTROL 6 ML: 604.72 INJECTION INTRAVENOUS at 16:30

## 2019-10-24 NOTE — DISCHARGE INSTRUCTIONS
MRI Contrast Discharge Instructions    The IV contrast you received today will pass out of your body in your  urine. This will happen in the next 24 hours. You will not feel this process.  Your urine will not change color.    Drink at least 4 extra glasses of water or juice today (unless your doctor  has restricted your fluids). This reduces the stress on your kidneys.  You may take your regular medicines.    If you are on dialysis: It is best to have dialysis today.    If you have a reaction: Most reactions happen right away. If you have  any new symptoms after leaving the hospital (such as hives or swelling),  call your hospital at the correct number below. Or call your family doctor.  If you have breathing distress or wheezing, call 911.    Special instructions: ***    I have read and understand the above information.    Signature:______________________________________ Date:___________    Staff:__________________________________________ Date:___________     Time:__________    [place] [facility] Departments:    ___[place]: [phone]  ___[facility]: [phone]  ___[place]: [phone] ___[facility]: [phone]  ___[facility]: [phone]  ___[facility]: [phone]  ___[phone]  ___[facility]: [phone]  ___[phone]

## 2019-10-25 DIAGNOSIS — Z15.09 BRCA2 POSITIVE: Primary | ICD-10-CM

## 2019-10-25 DIAGNOSIS — Z15.01 BRCA2 POSITIVE: Primary | ICD-10-CM

## 2019-10-28 ENCOUNTER — TELEPHONE (OUTPATIENT)
Dept: MAMMOGRAPHY | Facility: CLINIC | Age: 44
End: 2019-10-28

## 2019-10-28 NOTE — TELEPHONE ENCOUNTER
Left a message for Debbie regarding the need for a follow up imaging from her breast MRI done October 24th, 2019.  Awaiting return phone call.  Call back number left was [phone].

## 2019-10-29 ENCOUNTER — TELEPHONE (OUTPATIENT)
Dept: ONCOLOGY | Facility: CLINIC | Age: 44
End: 2019-10-29

## 2019-10-31 ENCOUNTER — ANCILLARY PROCEDURE (OUTPATIENT)
Dept: MAMMOGRAPHY | Facility: CLINIC | Age: 44
End: 2019-10-31
Attending: CLINICAL NURSE SPECIALIST
Payer: COMMERCIAL

## 2019-10-31 DIAGNOSIS — N63.0 BREAST MASS: Primary | ICD-10-CM

## 2019-10-31 DIAGNOSIS — R92.8 ABNORMAL FINDING ON BREAST IMAGING: ICD-10-CM

## 2019-10-31 RX ORDER — LIDOCAINE HYDROCHLORIDE 10 MG/ML
10 INJECTION, SOLUTION EPIDURAL; INFILTRATION; INTRACAUDAL; PERINEURAL ONCE
Status: COMPLETED | OUTPATIENT
Start: 2019-10-31 | End: 2019-10-31

## 2019-10-31 RX ADMIN — LIDOCAINE HYDROCHLORIDE 10 ML: 10 INJECTION, SOLUTION EPIDURAL; INFILTRATION; INTRACAUDAL; PERINEURAL at 09:20

## 2019-11-01 ENCOUNTER — TELEPHONE (OUTPATIENT)
Dept: GENERAL RADIOLOGY | Facility: CLINIC | Age: 44
End: 2019-11-01

## 2019-11-01 LAB — COPATH REPORT: NORMAL

## 2019-11-01 NOTE — TELEPHONE ENCOUNTER
Spoke with patient regarding right breast biopsy results, which indicate a benign fibroadenoma. Notified pt that the radiologist recommendation is a six month follow-up breast MRI. Pt verbalized understanding of these results and all questions answered to her satisfaction.

## 2019-11-03 DIAGNOSIS — Z15.01 BRCA2 POSITIVE: ICD-10-CM

## 2019-11-03 DIAGNOSIS — Z15.09 BRCA2 POSITIVE: ICD-10-CM

## 2019-11-03 DIAGNOSIS — R92.8 ABNORMAL FINDING ON BREAST IMAGING: Primary | ICD-10-CM

## 2019-11-04 ENCOUNTER — HEALTH MAINTENANCE LETTER (OUTPATIENT)
Age: 44
End: 2019-11-04

## 2019-12-06 DIAGNOSIS — Z15.09 BRCA2 POSITIVE: ICD-10-CM

## 2019-12-06 DIAGNOSIS — Z15.01 BRCA2 POSITIVE: ICD-10-CM

## 2019-12-06 LAB — CANCER AG125 SERPL-ACNC: 6 U/ML (ref 0–30)

## 2020-01-21 DIAGNOSIS — Z91.89 AT RISK FOR CANCER: ICD-10-CM

## 2020-01-21 DIAGNOSIS — Z15.01 BRCA2 POSITIVE: Primary | ICD-10-CM

## 2020-01-21 DIAGNOSIS — Z80.41 FAMILY HISTORY OF MALIGNANT NEOPLASM OF OVARY: ICD-10-CM

## 2020-01-21 DIAGNOSIS — Z15.09 BRCA2 POSITIVE: Primary | ICD-10-CM

## 2020-05-28 ENCOUNTER — DOCUMENTATION ONLY (OUTPATIENT)
Dept: ONCOLOGY | Facility: CLINIC | Age: 45
End: 2020-05-28

## 2020-05-28 DIAGNOSIS — Z11.59 ENCOUNTER FOR SCREENING FOR OTHER VIRAL DISEASES: Primary | ICD-10-CM

## 2020-05-28 DIAGNOSIS — Z15.02 BRCA2 GENE MUTATION POSITIVE IN FEMALE: Primary | ICD-10-CM

## 2020-05-28 DIAGNOSIS — Z15.01 BRCA2 GENE MUTATION POSITIVE IN FEMALE: Primary | ICD-10-CM

## 2020-05-28 DIAGNOSIS — Z15.09 BRCA2 GENE MUTATION POSITIVE IN FEMALE: Primary | ICD-10-CM

## 2020-05-28 RX ORDER — ACETAMINOPHEN 325 MG/1
975 TABLET ORAL ONCE
Status: CANCELLED | OUTPATIENT
Start: 2020-05-28 | End: 2020-05-28

## 2020-05-28 NOTE — PROGRESS NOTES
Surgery is scheduled with Dr. Murillo and Dr. Chilel on 6/23 at [place].  Scheduled per availability.    Pt has an appointment with Dr. Murillo on 6/15 to discuss further.    Per communication - I did not need to call the patient to provide any extra additional information. I will follow up if anything changes.

## 2020-06-01 ENCOUNTER — TELEPHONE (OUTPATIENT)
Dept: UROLOGY | Facility: CLINIC | Age: 45
End: 2020-06-01

## 2020-06-01 NOTE — TELEPHONE ENCOUNTER
Left message for pt to call and schedule a virtual visit with Dr. Chilel to discuss surgery, next available.

## 2020-06-01 NOTE — TELEPHONE ENCOUNTER
----- Message from Linh Arriaza RN sent at 6/1/2020  4:17 PM CDT -----  Regarding: FW: orders  Please schedule a virtual visit with Ranjana to discuss next step, next available  Please call to schedule   thanks  ----- Message -----  From: Pamela Chilel MD  Sent: 6/1/2020  12:51 PM CDT  To: Linh Arriaza RN, Elva Arias  Subject: RE: orders                                       We should arrange for a visit so I can talk to her about the surgery.  It can be virtual.  ----- Message -----  From: Elva Arias  Sent: 5/28/2020  11:47 AM CDT  To: Pamela Chilel MD, Linh Arriaza RN  Subject: orders                                           Dr Chilel    Are you doing a joint surgery with Dr Murillo?  You saw this patient in October. They are wanting to schedule a joint surgery on 6/23/20. Do you need to see patient prior to surgery?    Thanks  Elva

## 2020-06-08 ENCOUNTER — TRANSFERRED RECORDS (OUTPATIENT)
Dept: MULTI SPECIALTY CLINIC | Facility: CLINIC | Age: 45
End: 2020-06-08

## 2020-06-08 LAB
HPV ABSTRACT: NORMAL
PAP-ABSTRACT: NORMAL

## 2020-06-15 ENCOUNTER — VIRTUAL VISIT (OUTPATIENT)
Dept: ONCOLOGY | Facility: CLINIC | Age: 45
End: 2020-06-15
Attending: OBSTETRICS & GYNECOLOGY
Payer: COMMERCIAL

## 2020-06-15 ENCOUNTER — PRE VISIT (OUTPATIENT)
Dept: UROLOGY | Facility: CLINIC | Age: 45
End: 2020-06-15

## 2020-06-15 DIAGNOSIS — Z15.09 BRCA2 GENE MUTATION POSITIVE IN FEMALE: Primary | ICD-10-CM

## 2020-06-15 DIAGNOSIS — Z15.02 BRCA2 GENE MUTATION POSITIVE IN FEMALE: Primary | ICD-10-CM

## 2020-06-15 DIAGNOSIS — Z15.01 BRCA2 GENE MUTATION POSITIVE IN FEMALE: Primary | ICD-10-CM

## 2020-06-15 PROCEDURE — 40000114 ZZH STATISTIC NO CHARGE CLINIC VISIT

## 2020-06-15 PROCEDURE — 99213 OFFICE O/P EST LOW 20 MIN: CPT | Mod: GT | Performed by: OBSTETRICS & GYNECOLOGY

## 2020-06-15 NOTE — LETTER
"    6/15/2020         RE: Debbie Baker  [address]  [facility] [address]        Dear Colleague,    Thank you for referring your patient, Debbie Baker, to the [facility] CANCER CLINIC. Please see a copy of my visit note below.    Debbie Baker is a 44 year old female who is being evaluated via a billable video visit.      The patient has been notified of following:     \"This telephone visit will be conducted via a video between you and your physician/provider. We have found that certain health care needs can be provided without the need for a physical exam.  This service lets us provide the care you need with a short phone conversation.  If a prescription is necessary we can send it directly to your pharmacy.  If lab work is needed we can place an order for that and you can then stop by our lab to have the test done at a later time.    Telephone visits are billed at different rates depending on your insurance coverage. During this emergency period, for some insurers they may be billed the same as an in-person visit.  Please reach out to your insurance provider with any questions.    If during the course of the call the physician/provider feels a telephone visit is not appropriate, you will not be charged for this service.\"    Patient has given verbal consent for video visit?  Yes    What phone number would you like to be contacted at? [phone]    How would you like to obtain your AVS? Opal     I have reviewed and updated the patient's allergies and medication list.    Concerns: Questions about poss. Surgical plan moving forward.  She also has questions about attending a  in the near future.    Refills: None         ABIOLA Ross            Video call duration: 20 minutes                                       Follow up Notes on Referred Patient    Date: Sahra 15, 2020         Dr. Meliza Najera APRN CNS  [facility]  [address] [place] [address] "       RE: Debbie Baker  : 1975  ANDI: Sahra 15, 2020      Dear Dr. Meliza Najera:    I had the pleasure of seeing your patient Debbie Baker here at the Gynecologic Cancer Clinic at the [facility] on Sahra 15, 2020.  As you know she is a very pleasant 44 year old woman with a recent diagnosis of  BRCA 2+-found out in 2016.        Has been seen every 6 mos. Had a breast cyst that had a clip placed. Had IUD removed last week at annual exam-was at end of expiration date.  After removal bled x 2-3 days unsure if this was a normal period. When had her IUD, was about q 28 to 20 days-very minimal bleeding. GM had a stroke last week and they expect her to pass away. Does not want to go through surgery as to much for her mental health at this point. Wants to delay until August or Sept after possible trip as well.     HPI:  Ms. Baker is a very pleasant, healthy 41 year old female who presents with family history of breast and ovarian cancer and a personal diagnosis of a BRCA2 mutation, specifically [id number], found on single site analysis genetic testing through [facility] on 16.     Pertinent history:  Menarche at age 11  First child at age 30  6-8 month history of breast feeding  Currently using the Mirena IUD (inserted in )  Reports a 15-20 year history of oral contraceptive use  Her only past history with breast problems was an episode of mastitis when she was breastfeeding her children.   x 2    Breast screening history:  2014, Screening, mammogram,  BiRads1.    3/11/2016 - Screening mammogram, BiRads1  2016 - Breast MRI, Unremarkable bilateral breast MRI. BI-RADS CATEGORY: 1 -    NEGATIVE.     No history of breast biopsy    2016 Pelvic US  IMPRESSION:    1.  Negative pelvic ultrasound.  2.  IUD in appropriate position.     2016: -8    10/24/2019 pelvic US  IMPRESSION:   Two adjacent anechoic cysts in the right ovary appear smaller on  today's exam  compared to 4/12/2019. The left ovary is unremarkable.  Consider follow-up ultrasound in 6 months.       Date Value Ref Range Status   12/06/2019 6 0 - 30 U/mL Final     Comment:     Assay Method:  Chemiluminescence using Siemens Centaur XP   10/24/2019 39 (H) 0 - 30 U/mL Final     Comment:     Assay Method:  Chemiluminescence using Siemens Centaur XP   04/12/2019 9 0 - 30 U/mL Final     Comment:     Assay Method:  Chemiluminescence using Siemens Centaur XP   09/28/2018 8 0 - 30 U/mL Final     Comment:     Assay Method:  Chemiluminescence using Siemens Centaur XP   03/30/2018 6 0 - 30 U/mL Final     Comment:     Assay Method:  Chemiluminescence using Siemens Centaur XP   09/15/2017 8 0 - 30 U/mL Final     Comment:     Assay Method:  Chemiluminescence using Siemens Centaur XP   03/10/2017 9 0 - 30 U/mL Final     Comment:     Assay Method:  Chemiluminescence using Siemens Centaur XP   09/09/2016 8 0 - 30 U/mL Final     Comment:     Assay Method:  Chemiluminescence using Siemens Centaur XP   03/11/2016 10 0 - 30 U/mL Final         Review of Systems:    I have reviewed the pertinent positive findings in the review of symptoms with the patient.      Past Medical History:    Past Medical History:   Diagnosis Date     At high risk for breast cancer 01/12/2016    BRCA2+     At risk for cancer 01/12/2016    at risk for ovarian/fallopian tube cancer, BRCA2+     BRCA2 positive 01/12/2016    [id number]; single site analysis through [facility]     Mastitis in female 2010         Past Surgical History:    No past surgical history on file.  [place] teeth removed    Health Maintenance:  Health Maintenance Due   Topic Date Due     PREVENTIVE CARE VISIT  1975     DEPRESSION ACTION PLAN  1975     HIV SCREENING  09/01/1989     PAP  09/01/1995     DTAP/TDAP/TD IMMUNIZATION (1 - Tdap) 09/01/1999     LIPID  09/01/2019     PHQ-9  11/29/2019       Last Pap Smear: 6/2020              Result: pending  She has not  had a history of abnormal Pap smears.    Last Mammogram: 2020             Result: required biospy    She has not had a history of abnormal mammograms.    Last Colonoscopy: x1              Result: normal                        Current Medications:     has a current medication list which includes the following prescription(s): bupropion, vitamin d3, fluticasone, ibuprofen, levonorgestrel, multiple vitamin, and tretinoin.       Allergies:     Allergies   Allergen Reactions     Dust Mites Other (See Comments)            Social History:     Social History     Tobacco Use     Smoking status: Never Smoker     Smokeless tobacco: Never Used   Substance Use Topics     Alcohol use: Yes     Alcohol/week: 0.0 standard drinks     Comment: 1 drink/week       History   Drug Use No           Family History:     The patient's family history is notable for the following.    Family History   Problem Relation Age of Onset     Hereditary Breast and Ovarian Cancer Syndrome Mother         BRCA2+ (same genetic mutation), no cancer     Hypertension Mother      Hypertension Father      Hearing Loss Father      Ovarian Cancer Maternal Grandmother 59         at 61     Cancer Maternal Grandmother         Ovarian Cancer     Pancreatic Cancer Maternal Grandfather 87         at 87     Cancer Maternal Grandfather         Pancreatic Cancer     Skin Cancer Paternal Grandmother 88     Bladder Cancer Paternal Grandfather 60         at 80     Cancer Paternal Grandfather         Bladder     Hereditary Breast and Ovarian Cancer Syndrome Maternal Uncle         BRCA2+, no cancer     Breast Cancer Maternal Aunt 51        BRCA2+     Hereditary Breast and Ovarian Cancer Syndrome Cousin         BRCA2+     Mother had ovaries out.  Maternal GM sister with pancreatic cancer  Maternal aunt breast cancer 50's or late 40's  Mat gm sister had primary peritoneal cancer late 60's 70's    Physical Exam:     No physical exam or VS done due to virtual visit  because of COVID 19.       Psychiatric: appropriate mood and affect                          Assessment:    Debbie Baker is a 44 year old woman with a new diagnosis of BRCA2+. Here to discuss prophylactic surgery.      Plan:     1.)    Recommend prophylactic removal of FT's and ovaries and visit with urology-with Dr. Chilel due to urinary incontinence issues.  Desires pelvic sling. Patient wants to delay until August and Sept, not employed but unsure she wants to do now due to recent stroke in  and planned trip. Was supposed to go to [facility]-July 24 which would be about a month post op. Discussed risks, benefits and alternatives to removal of ovaries and or uterus. Patient very concerned re hot flashes, bone loss, etc. I explained that ususally patients with BRCA 2+ develop ovarian cancer at later age if they are to develop it. We discussed this in detail and patient unsure right now of when she wants to do procedure but will think about it. I explained we have no good screening test for ovarian cancer but would recommend q 6 mos US and  levels. Needs q 6 mos screening mammogram followed by MRI's. She has arranged this with Meliza Najera. Declined consult re mastectomy at this point.      Wants to put off for her mental health,  is dying-emotionally not ready to proceed. Wants to do August or September now instead. Has US scheduled July 24 (6 mos visit already planned) and will have  done at that time too.      2.) Genetic risk factors were assessed - BRCA 2+    3.) Labs and/or tests ordered include:  none.     4.) Health maintenance issues addressed today include q 6 mos US and  levels and breast imaging q 6 months-already scheduled for July 2020.        Thank you for allowing us to participate in the care of your patient.         Sincerely,    Celeste Murillo MD    Department of Ob/Gyn and Women's Health  Division of Gynecologic Oncology  [facility]  [place]  [phone]    Of a 20 minute appointment, the entire time was spent counseling the patient via video visit due to COVID 19 to decrease the patient and provider risk to exposure.      CC  Patient Care Team:  Aspen Renteria as PCP - General (Nurse Practitioner)  Marianela Davis MD (OB/Gyn)  Bel Jo MD as MD (Urology)  Celeste Murillo MD as Referring Physician (Oncology)  Pamela Chilel MD as MD (Urology)  Linh Arriaza, RN as Specialty Care Coordinator (Urology)  Aspen Renteria as Referring Physician (Nurse Practitioner)  SWATI GAMINO
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
## 2020-06-15 NOTE — TELEPHONE ENCOUNTER
Reason for Visit: Discuss surgery    Diagnosis: LIZETH    Orders/Procedures/Records: in system    Contact Patient: n/a    Rooming Requirements: Virtual Visit      Fabiola Maher LPN  06/15/20  11:16 AM

## 2020-06-16 NOTE — PATIENT INSTRUCTIONS
Reschedule surgery to end of august or September  Pre operative visit with primary care or PAC clinic

## 2020-06-17 ENCOUNTER — VIRTUAL VISIT (OUTPATIENT)
Dept: UROLOGY | Facility: CLINIC | Age: 45
End: 2020-06-17
Payer: COMMERCIAL

## 2020-06-17 DIAGNOSIS — N39.3 FEMALE STRESS INCONTINENCE: Primary | ICD-10-CM

## 2020-06-17 RX ORDER — CEFAZOLIN SODIUM 1 G/50ML
1 INJECTION, SOLUTION INTRAVENOUS SEE ADMIN INSTRUCTIONS
Status: CANCELLED | OUTPATIENT
Start: 2020-06-17

## 2020-06-17 RX ORDER — CEFAZOLIN SODIUM 2 G/50ML
2 SOLUTION INTRAVENOUS
Status: CANCELLED | OUTPATIENT
Start: 2020-06-17

## 2020-06-17 NOTE — LETTER
"6/17/2020       RE: Debbie Baker  [address]  [facility] [address]     Dear Colleague,    Thank you for referring your patient, Debbie Baker, to the [facility] UROLOGY AND INST FOR PROSTATE AND UROLOGIC CANCERS at [facility]. Please see a copy of my visit note below.    Debbie Baker is a 44 year old female who is being evaluated via a billable telephone visit.      The patient has been notified of following:     \"This telephone visit will be conducted via a call between you and your physician/provider. We have found that certain health care needs can be provided without the need for a physical exam.  This service lets us provide the care you need with a short phone conversation.  If a prescription is necessary we can send it directly to your pharmacy.  If lab work is needed we can place an order for that and you can then stop by our lab to have the test done at a later time.    Telephone visits are billed at different rates depending on your insurance coverage. During this emergency period, for some insurers they may be billed the same as an in-person visit.  Please reach out to your insurance provider with any questions.    If during the course of the call the physician/provider feels a telephone visit is not appropriate, you will not be charged for this service.\"    Patient has given verbal consent for Telephone visit?  Yes    What phone number would you like to be contacted at? [phone]    How would you like to obtain your AVS? Opal Chilel MD        Reason for call:  F/u on stress incontinence.    Clinical Data:  Ms. Baker is a 43 y/o female with hx of stress urinary incontinence.  She was in the treatment arm of Pelvital trial for 12 weeks.  She reports at least a 50% improvement.  She feels that she has more control.  When she runs she also feels that she has had an improvement and has had less incontinence.  However she continues to have " significant stress incontinence and would like to proceed with the surgery.    A/P:  43 y/o female with stress urinary incontinence that was only partially treated with pelvital device.  We discussed further treatment including PT and midurethral sling.  She would like to proceed with a midurethral sling in conjunction with an oophorectomy by Dr. Murillo.  However her grandmother recently passed and she would like to postpone until end of summer.  We discussed the use of mesh in surgery and the risks which include but are not limited to infection, bleeding, exposure of mesh, vaginal pain, injury to the bladder/urethra/rectum or any structures in the abdomen or pelvis, as well as risk of incontinence or urinary retention. There is also risk of need for catheterization and possible further surgery.   We discussed the FDA public health notification at length.  The pt. Verbalized understanding and had a chance to ask her questions which were all answered.  -schedule midurethal sling in August.    Thank you for allowing me to participate in the care of  Ms. Debbie Baker and I will keep you updated on her progress.    Pamela Chilel MD     12 min spent with pt on the phone in discussion.

## 2020-06-17 NOTE — PROGRESS NOTES
Reason for call:  F/u on stress incontinence.    Clinical Data:  Ms. Baker is a 45 y/o female with hx of stress urinary incontinence.  She was in the treatment arm of Pelvital trial for 12 weeks.  She reports at least a 50% improvement.  She feels that she has more control.  When she runs she also feels that she has had an improvement and has had less incontinence.  However she continues to have significant stress incontinence and would like to proceed with the surgery.    A/P:  45 y/o female with stress urinary incontinence that was only partially treated with pelvital device.  We discussed further treatment including PT and midurethral sling.  She would like to proceed with a midurethral sling in conjunction with an oophorectomy by Dr. Murillo.  However her grandmother recently passed and she would like to postpone until end of summer.  We discussed the use of mesh in surgery and the risks which include but are not limited to infection, bleeding, exposure of mesh, vaginal pain, injury to the bladder/urethra/rectum or any structures in the abdomen or pelvis, as well as risk of incontinence or urinary retention. There is also risk of need for catheterization and possible further surgery.   We discussed the FDA public health notification at length.  The pt. Verbalized understanding and had a chance to ask her questions which were all answered.  -schedule midurethal sling in August.    Thank you for allowing me to participate in the care of  Ms. Debbie Baker and I will keep you updated on her progress.    Pamela Chilel MD     12 min spent with pt on the phone in discussion.

## 2020-06-17 NOTE — PROGRESS NOTES
"Debbie Baker is a 44 year old female who is being evaluated via a billable telephone visit.      The patient has been notified of following:     \"This telephone visit will be conducted via a call between you and your physician/provider. We have found that certain health care needs can be provided without the need for a physical exam.  This service lets us provide the care you need with a short phone conversation.  If a prescription is necessary we can send it directly to your pharmacy.  If lab work is needed we can place an order for that and you can then stop by our lab to have the test done at a later time.    Telephone visits are billed at different rates depending on your insurance coverage. During this emergency period, for some insurers they may be billed the same as an in-person visit.  Please reach out to your insurance provider with any questions.    If during the course of the call the physician/provider feels a telephone visit is not appropriate, you will not be charged for this service.\"    Patient has given verbal consent for Telephone visit?  Yes    What phone number would you like to be contacted at? [phone]    How would you like to obtain your AVS? Opal Chilel MD      "

## 2020-06-17 NOTE — NURSING NOTE
Chief Complaint   Patient presents with     RECHECK     Incontinence follow up.  Discuss upcoming surgery     Marielle Gong CMA

## 2020-07-15 ENCOUNTER — DOCUMENTATION ONLY (OUTPATIENT)
Dept: ONCOLOGY | Facility: CLINIC | Age: 45
End: 2020-07-15

## 2020-07-15 DIAGNOSIS — Z11.59 ENCOUNTER FOR SCREENING FOR OTHER VIRAL DISEASES: Primary | ICD-10-CM

## 2020-07-15 NOTE — PROGRESS NOTES
Surgery is scheduled with Dr. Chilel and Dr. Murillo on 9/15 at [place].  Scheduled per orders.    The RN completed the education regarding the surgery.     The surgery packet was provided that contains surgical instructions and a map.    Pt is aware that they will be contacted to schedule a COVID-19 test within 3 days of surgery.     They are aware that they will receive a call  ~2 days prior to the scheduled procedure and will be given an exact arrival/start time.    Per communication - I did not need to call the patient to provide any extra additional information. I will follow up if anything changes.

## 2020-09-02 ENCOUNTER — PRE VISIT (OUTPATIENT)
Dept: SURGERY | Facility: CLINIC | Age: 45
End: 2020-09-02

## 2020-09-02 NOTE — TELEPHONE ENCOUNTER
FUTURE VISIT INFORMATION      SURGERY INFORMATION:    Date: 9/15/20    Location: [facility] OR    Surgeon:  Celeste Murillo MD Nakib, Nissrine A, MD     Anesthesia Type:  General    Procedure: laparoscopic bilateral salpingo-oophorectomy possible open abdomen, possible hysterectomy and cancer staging Midurethral sling and cystoscopy     Consult: Virtual visit 20    RECORDS REQUESTED FROM:       Primary Care Provider: Beh, Merritt M, MD  - [facility]    Most recent EKG+ Tracin20- [facility]

## 2020-09-08 ENCOUNTER — VIRTUAL VISIT (OUTPATIENT)
Dept: SURGERY | Facility: CLINIC | Age: 45
End: 2020-09-08
Payer: COMMERCIAL

## 2020-09-08 ENCOUNTER — TELEPHONE (OUTPATIENT)
Dept: UROLOGY | Facility: CLINIC | Age: 45
End: 2020-09-08

## 2020-09-08 ENCOUNTER — ANESTHESIA EVENT (OUTPATIENT)
Dept: SURGERY | Facility: CLINIC | Age: 45
End: 2020-09-08
Payer: COMMERCIAL

## 2020-09-08 ENCOUNTER — ALLIED HEALTH/NURSE VISIT (OUTPATIENT)
Dept: UROLOGY | Facility: CLINIC | Age: 45
End: 2020-09-08
Payer: COMMERCIAL

## 2020-09-08 VITALS — TEMPERATURE: 98.4 F

## 2020-09-08 DIAGNOSIS — R32 URINARY INCONTINENCE, UNSPECIFIED TYPE: ICD-10-CM

## 2020-09-08 DIAGNOSIS — Z15.01 BRCA2 GENE MUTATION POSITIVE: ICD-10-CM

## 2020-09-08 DIAGNOSIS — N39.0 URINARY TRACT INFECTION WITHOUT HEMATURIA, SITE UNSPECIFIED: Primary | ICD-10-CM

## 2020-09-08 DIAGNOSIS — N39.0 URINARY TRACT INFECTION WITHOUT HEMATURIA, SITE UNSPECIFIED: ICD-10-CM

## 2020-09-08 DIAGNOSIS — R30.0 DYSURIA: Primary | ICD-10-CM

## 2020-09-08 DIAGNOSIS — Z01.818 PREOP EXAMINATION: Primary | ICD-10-CM

## 2020-09-08 DIAGNOSIS — Z01.818 PREOP EXAMINATION: ICD-10-CM

## 2020-09-08 DIAGNOSIS — Z15.09 BRCA2 GENE MUTATION POSITIVE: ICD-10-CM

## 2020-09-08 LAB
ABO + RH BLD: NORMAL
ABO + RH BLD: NORMAL
ALBUMIN UR-MCNC: NEGATIVE MG/DL
ANION GAP SERPL CALCULATED.3IONS-SCNC: 6 MMOL/L (ref 3–14)
APPEARANCE UR: CLEAR
BILIRUB UR QL STRIP: NEGATIVE
BLD GP AB SCN SERPL QL: NORMAL
BLOOD BANK CMNT PATIENT-IMP: NORMAL
BUN SERPL-MCNC: 12 MG/DL (ref 7–30)
CALCIUM SERPL-MCNC: 9.4 MG/DL (ref 8.5–10.1)
CHLORIDE SERPL-SCNC: 107 MMOL/L (ref 94–109)
CO2 SERPL-SCNC: 26 MMOL/L (ref 20–32)
COLOR UR AUTO: NORMAL
CREAT SERPL-MCNC: 0.85 MG/DL (ref 0.52–1.04)
ERYTHROCYTE [DISTWIDTH] IN BLOOD BY AUTOMATED COUNT: 12.1 % (ref 10–15)
GFR SERPL CREATININE-BSD FRML MDRD: 82 ML/MIN/{1.73_M2}
GLUCOSE SERPL-MCNC: 89 MG/DL (ref 70–99)
GLUCOSE UR STRIP-MCNC: NEGATIVE MG/DL
HCT VFR BLD AUTO: 44 % (ref 35–47)
HGB BLD-MCNC: 14.9 G/DL (ref 11.7–15.7)
HGB UR QL STRIP: NEGATIVE
KETONES UR STRIP-MCNC: NEGATIVE MG/DL
LEUKOCYTE ESTERASE UR QL STRIP: NEGATIVE
MCH RBC QN AUTO: 31.6 PG (ref 26.5–33)
MCHC RBC AUTO-ENTMCNC: 33.9 G/DL (ref 31.5–36.5)
MCV RBC AUTO: 93 FL (ref 78–100)
NITRATE UR QL: NEGATIVE
PH UR STRIP: 7 PH (ref 5–7)
PLATELET # BLD AUTO: 259 10E9/L (ref 150–450)
POTASSIUM SERPL-SCNC: 4.2 MMOL/L (ref 3.4–5.3)
RBC # BLD AUTO: 4.71 10E12/L (ref 3.8–5.2)
SODIUM SERPL-SCNC: 138 MMOL/L (ref 133–144)
SOURCE: NORMAL
SP GR UR STRIP: 1 (ref 1–1.03)
SPECIMEN EXP DATE BLD: NORMAL
UROBILINOGEN UR STRIP-MCNC: 0 MG/DL (ref 0–2)
WBC # BLD AUTO: 6.5 10E9/L (ref 4–11)

## 2020-09-08 ASSESSMENT — PAIN SCALES - GENERAL: PAINLEVEL: NO PAIN (0)

## 2020-09-08 ASSESSMENT — COPD QUESTIONNAIRES: COPD: 0

## 2020-09-08 ASSESSMENT — LIFESTYLE VARIABLES: TOBACCO_USE: 0

## 2020-09-08 NOTE — PROGRESS NOTES
Patient came in today to sign surgery consent for upcoming surgery with Dr. Chilel. Sent to scanning.

## 2020-09-08 NOTE — H&P
Pre-Operative H & P     CC:  Preoperative exam to assess for increased cardiopulmonary risk while undergoing surgery and anesthesia.        Video-Visit Details    Type of service:  Video Visit    Patient verbally consented to video service today: YES      Video Start Time: 1001  Video End Time (time video stopped): 1016  Citrix disconnection occurred at 1016, so visit was completed on phone with completion of total visit at 1028.    Originating Location (pt. Location): Home    Distant Location (provider location):  [facility] PREOPERATIVE ASSESSMENT [facility]ENTER     Mode of Communication:  Video Conference via Grokr        Date of Encounter: 9/8/2020  Primary Care Physician:  Aspen Renteria  Associated diagnosis: urinary incontinence and BRCA2 gene mutation    HPI  Debbie Baker is a 45 year old female who presents for pre-operative H & P in preparation for a laparoscopic bilateral salpingo-oophorectomy (Bilateral Abdomen) possible open abdomen, possible hysterectomy and cancer staging (Bilateral Abdomen) Midurethral sling and cystoscopy on 9/15/20 by Iris Murillo and Ranjana  at [facility].     Debbie Baker is a 45 year old female with allergic rhinitis, depression and seasonal affective disorder who has urinary incontinence and BRCA2 gene mutation.  She has a family history of breast and ovarian cancer.  She had testing done in 2016 and found out she had the BRCA2 gene mutation.  She consulted with Dr. Murillo with interest in prophylactic surgery.  She also notes that she has had gradually worsening urinary incontinence since the birth of her last child 11 years ago.  She consulted with Dr. Chilel regarding possible surgical management.  The above combined surgery has now been recommended.      History is obtained from the patient and the medical record.     Past Medical History  Past Medical History:   Diagnosis Date     Allergic rhinitis      At high risk for breast cancer  01/12/2016    BRCA2+     At risk for cancer 01/12/2016    at risk for ovarian/fallopian tube cancer, BRCA2+     BRCA2 positive 01/12/2016    [id number]; single site analysis through [employer]     Depression      Mastitis in female 2010       Past Surgical History  Past Surgical History:   Procedure Laterality Date     CARPAL TUNNEL RELEASE RT/LT Bilateral     2018     wisdom teeth         Hx of Blood transfusions/reactions: none    Hx of abnormal bleeding or anti-platelet use: none    Menstrual history: Patient's last menstrual period was 08/24/2020 (approximate).:     Steroid use in the last year: none    Personal or FH with difficulty with Anesthesia:  none    Prior to Admission Medications  Current Outpatient Medications   Medication Sig Dispense Refill     buPROPion (WELLBUTRIN) 100 MG tablet Take 100 mg by mouth 2 times daily        Cholecalciferol (VITAMIN D) 2000 UNITS tablet Take 2,000 Units by mouth every morning        fluticasone (FLONASE) 50 MCG/ACT nasal spray Spray 1 spray into both nostrils every morning        Multiple Vitamin (M.V.I. ADULT IV) Take 1 tablet by mouth every morning        tretinoin (RETIN-A) 0.05 % cream Apply topically as needed          Allergies  Allergies   Allergen Reactions     Dust Mites Other (See Comments)       Social History  Social History     Socioeconomic History     Marital status:      Spouse name: Tavares     Number of children: 2     Years of education: Not on file     Highest education level: Not on file   Occupational History     Occupation: unemployed   Social Needs     Financial resource strain: Not on file     Food insecurity     Worry: Not on file     Inability: Not on file     Transportation needs     Medical: Not on file     Non-medical: Not on file   Tobacco Use     Smoking status: Never Smoker     Smokeless tobacco: Never Used   Substance and Sexual Activity     Alcohol use: Yes     Alcohol/week: 0.0 standard drinks     Comment: 1 drink/week      Drug use: No     Sexual activity: Yes     Partners: Male     Birth control/protection: I.U.D.     Comment: Mirena   Lifestyle     Physical activity     Days per week: Not on file     Minutes per session: Not on file     Stress: Not on file   Relationships     Social connections     Talks on phone: Not on file     Gets together: Not on file     Attends Christianity service: Not on file     Active member of club or organization: Not on file     Attends meetings of clubs or organizations: Not on file     Relationship status: Not on file     Intimate partner violence     Fear of current or ex partner: Not on file     Emotionally abused: Not on file     Physically abused: Not on file     Forced sexual activity: Not on file   Other Topics Concern      Service Not Asked     Blood Transfusions Not Asked     Caffeine Concern No     Occupational Exposure No     Hobby Hazards No     Sleep Concern No     Stress Concern Yes     Weight Concern No     Special Diet No     Back Care No     Exercise Yes     Comment: works out with , does yoga 3-4x/week     Bike Helmet Not Asked     Seat Belt Yes     Self-Exams Yes     Parent/sibling w/ CABG, MI or angioplasty before 65F 55M? No   Social History Narrative     Not on file       Family History  Family History   Problem Relation Age of Onset     Hypertension Mother      Hypertension Father      Hearing Loss Father      Valvular heart disease Father         s/p mitral valve replacement     Cerebrovascular Disease Father         TIAs     Ovarian Cancer Maternal Grandmother 59         at 61     Cancer Maternal Grandmother         Ovarian Cancer     Pancreatic Cancer Maternal Grandfather 87         at 87     Cancer Maternal Grandfather         Pancreatic Cancer     Skin Cancer Paternal Grandmother 88     Bladder Cancer Paternal Grandfather 60         at 80     Cancer Paternal Grandfather         Bladder     Breast Cancer Maternal Aunt 51        BRCA2+      No Known Problems Sister      No Known Problems Brother                ROS/MED HX    The complete review of systems is negative other than noted in the HPI or here.   ENT/Pulmonary:  - neg pulmonary ROS    (-) tobacco use, asthma, COPD and recent URI   Neurologic:  - neg neurologic ROS     Cardiovascular:  - neg cardiovascular ROS   (+) ----. : . . . :. . Previous cardiac testing date:results:date: results:ECG reviewed date:4/2020 results:NSR date: results:          METS/Exercise Tolerance:  >4 METS   Hematologic:  - neg hematologic  ROS      (-) history of blood clots and History of Transfusion   Musculoskeletal:   (+)  other musculoskeletal- possible right elbow tendonitis      GI/Hepatic:  - neg GI/hepatic ROS       Renal/Genitourinary:     (+) Other Renal/ Genitourinary, urinary incontinence      Endo:  - neg endo ROS       Psychiatric:     (+) psychiatric history depression      Infectious Disease:  - neg infectious disease ROS      (-) Recent Fever   Malignancy:      - no malignancy   Other:    (+) No chance of pregnancy no H/O Chronic Pain,  - neg other ROS             Temp: 98.4  F (36.9  C) Temp src: Tympanic                   0 lbs 0 oz  Data Unavailable   There is no height or weight on file to calculate BMI.       Physical Exam - virtual visit  Constitutional: Awake, alert, cooperative, no apparent distress, and appears stated age.  Neurologic: Awake, alert, oriented to name, place and time.   Neuropsychiatric: Calm, cooperative. Normal affect.     Labs: (personally reviewed)   Component      Latest Ref Rng & Units 9/8/2020   Sodium      133 - 144 mmol/L 138   Potassium      3.4 - 5.3 mmol/L 4.2   Chloride      94 - 109 mmol/L 107   Carbon Dioxide      20 - 32 mmol/L 26   Anion Gap      3 - 14 mmol/L 6   Glucose      70 - 99 mg/dL 89   Urea Nitrogen      7 - 30 mg/dL 12   Creatinine      0.52 - 1.04 mg/dL 0.85   GFR Estimate      >60 mL/min/1.73:m2 82   GFR Estimate If Black      >60 mL/min/1.73:m2 >90    Calcium      8.5 - 10.1 mg/dL 9.4   WBC      4.0 - 11.0 10e9/L 6.5   RBC Count      3.8 - 5.2 10e12/L 4.71   Hemoglobin      11.7 - 15.7 g/dL 14.9   Hematocrit      35.0 - 47.0 % 44.0   MCV      78 - 100 fl 93   MCH      26.5 - 33.0 pg 31.6   MCHC      31.5 - 36.5 g/dL 33.9   RDW      10.0 - 15.0 % 12.1   Platelet Count      150 - 450 10e9/L 259     EK20  NSR (patient doesn't recall this happening, but is visible in Care Everywhere through [facility])      Outside records reviewed from: Care Everywhere    ASSESSMENT and PLAN    Debbie Baker is a 45 year old female scheduled for a laparoscopic bilateral salpingo-oophorectomy (Bilateral Abdomen) possible open abdomen, possible hysterectomy and cancer staging (Bilateral Abdomen) Midurethral sling and cystoscopy on 9/15/20 by Iris Murillo and Ranjana in treatment/management of urinary incontinence and BRCA2 gene mutation.  PAC referral for risk assessment and optimization for anesthesia with comorbid conditions of: allergic rhinitis, depression and seasonal affective disorder.      Pre-operative considerations:  1.  Cardiac:  Functional status- METS >4.  She runs 3-4 days per week and walks 3-4 days per week for exercise.  She has no known cardiac conditions.   Intermediate risk surgery with 0.9% risk of major adverse cardiac event.   2.  Pulm:  Airway feasible.  PANCHITO risk: low.  3.  GI:  Risk of PONV score = 3.  If > 2, anti-emetic intervention recommended.  4. Musculoskeletal:  She feels she may have right elbow tendonitis.  Consider cautious positioning.       VTE risk: 0.26%      Virtual visit - Please refer to the physical examination documented by the anesthesiologist in the anesthesia record on the day of surgery      Patient is optimized and is acceptable candidate for the proposed procedure.  No further diagnostic evaluation is needed.         Sienna Salmon DNP, RN, APRN  Preoperative Assessment Center  [facility]  Clinic and  Surgery Center  Phone: [phone]  Fax: [phone]

## 2020-09-08 NOTE — TELEPHONE ENCOUNTER
----- Message from HENRIQUE Morales CNP sent at 9/8/2020  9:59 AM CDT -----  Regarding:  order needed  Hello      I am seeing Debbie in PAC this morning.  Please place the  order for Dr. Chilel.      Sienna Salmon DNP, RN, APRN

## 2020-09-08 NOTE — PATIENT INSTRUCTIONS
Preparing for Your Surgery      Name:  Debbie Baker   MRN:  [id number]   :  1975   Today's Date:  2020       Arriving for surgery:  Surgery date:  9/15/20  Arrival time:  6:00 am    Restrictions due to COVID 19:  Patients are allowed one visitor in the pre-op period  All visitors must wear a mask  No visitors under 18  No ill visitors   parking is not available     Please come to:       [facility] [address]  [address] [place]  [address]    -    Please proceed to the Surgery Lounge on the 3rd floor. [phone]?     - ?If you are in need of directions, wheelchair or escort please stop at the Information Desk in the lobby.  Inform the information person that you are here for surgery; a wheelchair and escort will be provided to the Surgery Lounge .?     What can I eat or drink?  -  You may eat and drink normally for up to 8 hours before your surgery. (Until midnight)  -  You may have clear liquids until 2 hours before surgery. (Until 6:00 am)    Examples of clear liquids:  Water  Clear broth  Juices (apple, white grape, white cranberry  and cider) without pulp  Noncarbonated, powder based beverages  (lemonade and Ruperto-Aid)  Sodas (Sprite, 7-Up, ginger ale and seltzer)  Coffee or tea (without milk or cream)  Gatorade    -  No Alcohol for at least 24 hours before surgery     Which medicines can I take?    Hold Aspirin for 7 days before surgery.   Hold Multivitamins for 7 days before surgery.  Hold Supplements for 7 days before surgery.  Hold Ibuprofen (Advil, Motrin) for 1 day before surgery--unless otherwise directed by surgeon.  Hold Naproxen (Aleve) for 4 days before surgery.    -  PLEASE TAKE these medications the day of surgery:  Bupropion (Wellbutrin)  Fluticasone (Flonase) nasal spray    How do I prepare myself?  - Please take 2 showers before surgery using Scrubcare or Hibiclens soap.    Use this soap only from the neck to your toes.     Leave the  soap on your skin for one minute--then rinse thoroughly.      You may use your own shampoo and conditioner; no other hair products.   - Please remove all jewelry and body piercings.  - No lotions, deodorants or fragrance.  - No makeup or fingernail polish.   - Bring your ID and insurance card.    - All patients are required to have a Covid-19 test within 4 days of surgery/procedure.      -Patients will be contacted by the [facility] scheduling team within 1 week of surgery to make an appointment.      - Patients may call the Scheduling team at [phone] if they have not been scheduled within 4 days of  surgery.      ALL PATIENTS GOING HOME THE SAME DAY OF SURGERY ARE REQUIRED TO HAVE A RESPONSIBLE ADULT TO DRIVE AND BE IN ATTENDANCE WITH THEM FOR 24 HOURS FOLLOWING SURGERY     Questions or Concerns:    - For any questions regarding the day of surgery or your hospital stay, please contact the Pre Admission Nursing Office at [phone].       - If you have health changes between today and your surgery please call your surgeon.       For questions after surgery please call your surgeons office.

## 2020-09-08 NOTE — ANESTHESIA PREPROCEDURE EVALUATION
"Anesthesia Pre-Procedure Evaluation    Patient: Debbie Baker   MRN:     [id number] Gender:   female   Age:    45 year old :      1975        Preoperative Diagnosis: BRCA2 gene mutation positive in female [Z15.01, Z15.02, Z15.09]   Procedure(s):  laparoscopic bilateral salpingo-oophorectomy  possible open abdomen, possible hysterectomy and cancer staging  Midurethral sling and cystoscopy     LABS:  CBC: No results found for: WBC, HGB, HCT, PLT  BMP: No results found for: NA, POTASSIUM, CHLORIDE, CO2, BUN, CR, GLC  COAGS: No results found for: PTT, INR, FIBR  POC:   Lab Results   Component Value Date    HCG Negative 2019     OTHER: No results found for: PH, LACT, A1C, JERMAINE, PHOS, MAG, ALBUMIN, PROTTOTAL, ALT, AST, GGT, ALKPHOS, BILITOTAL, BILIDIRECT, LIPASE, AMYLASE, MARCELINO, TSH, T4, T3, CRP, SED     Preop Vitals    BP Readings from Last 3 Encounters:   19 148/80   18 114/80   17 119/80    Pulse Readings from Last 3 Encounters:   19 66   18 68   17 71      Resp Readings from Last 3 Encounters:   19 14   18 16   17 16    SpO2 Readings from Last 3 Encounters:   19 99%   18 99%   17 96%      Temp Readings from Last 1 Encounters:   20 98.4  F (36.9  C) (Tympanic)    Ht Readings from Last 1 Encounters:   19 1.6 m (5' 2.99\")      Wt Readings from Last 1 Encounters:   19 63.5 kg (140 lb)    Estimated body mass index is 24.81 kg/m  as calculated from the following:    Height as of 19: 1.6 m (5' 2.99\").    Weight as of 19: 63.5 kg (140 lb).     LDA:        Past Medical History:   Diagnosis Date     Allergic rhinitis      At high risk for breast cancer 2016    BRCA2+     At risk for cancer 2016    at risk for ovarian/fallopian tube cancer, BRCA2+     BRCA2 positive 2016    [id number]; single site analysis through [employer]     Depression      Mastitis in female       No past surgical " history on file.   Allergies   Allergen Reactions     Dust Mites Other (See Comments)        Anesthesia Evaluation     . Pt has had prior anesthetic. Type: MAC    No history of anesthetic complications          ROS/MED HX    ENT/Pulmonary:  - neg pulmonary ROS    (-) tobacco use, asthma, COPD and recent URI   Neurologic:  - neg neurologic ROS     Cardiovascular:  - neg cardiovascular ROS   (+) ----. : . . . :. . Previous cardiac testing date:results:date: results:ECG reviewed date:4/2020 results:NSR date: results:          METS/Exercise Tolerance:  >4 METS   Hematologic:  - neg hematologic  ROS      (-) history of blood clots and History of Transfusion   Musculoskeletal:   (+)  other musculoskeletal- possible right elbow tendonitis      GI/Hepatic:  - neg GI/hepatic ROS       Renal/Genitourinary:     (+) Other Renal/ Genitourinary, urinary incontinence      Endo:  - neg endo ROS       Psychiatric:     (+) psychiatric history depression      Infectious Disease:  - neg infectious disease ROS      (-) Recent Fever   Malignancy:      - no malignancy   Other:    (+) No chance of pregnancy no H/O Chronic Pain,  - neg other ROS                     PHYSICAL EXAM:   Mental Status/Neuro: A/A/O   Airway: Facies: Feasible  Mallampati: II  Mouth/Opening: Full  TM distance: > 6 cm  Neck ROM: Full   Respiratory: Auscultation: CTAB     Resp. Rate: Normal     Resp. Effort: Normal      CV: Rhythm: Regular  Rate: Age appropriate  Heart: Normal Sounds  Edema: None   Comments:      Dental: Normal Dentition                Assessment:   ASA SCORE: 1    H&P: History and physical reviewed and following examination; no interval change.   Smoking Status:  Non-Smoker/Unknown   NPO Status: NPO Appropriate     Plan:   Anes. Type:  General   Pre-Medication: None   Induction:  IV (Standard)   Airway: ETT; Oral   Access/Monitoring: PIV; 2nd PIV   Maintenance: Balanced     Postop Plan:   Postop Pain: Opioids; Regional  Postop Sedation/Airway: Not  planned  Disposition: Outpatient     PONV Management:   Adult Risk Factors: Female, Non-Smoker, Postop Opioids   Prevention: Ondansetron, Dexamethasone     CONSENT: Direct conversation   Plan and risks discussed with: Patient; Spouse   Blood Products: Consented (ALL Blood Products)       Comments for Plan/Consent:  Patient seen and examined by me and available records reviewed. Details as above.  No previous GA; no FH problems with GA.No recent URI; denies asthma sx.  Covid neg 9/12; HCG neg.  Anesthetic options and risks discussed with patient who agrees to proceed.      Faby Guillen MD  9/15/2020  7:54 AM              PAC Discussion and Assessment    ASA Classification: 1  Case is suitable for: [place]  Anesthetic techniques and relevant risks discussed: GA  Invasive monitoring and risk discussed:   Types:   Possibility and Risk of blood transfusion discussed:   NPO instructions given:   Additional anesthetic preparation and risks discussed:   Needs early admission to pre-op area:   Other:     PAC Resident/NP Anesthesia Assessment:  Debbie Baker is a 45 year old female scheduled for a laparoscopic bilateral salpingo-oophorectomy (Bilateral Abdomen) possible open abdomen, possible hysterectomy and cancer staging (Bilateral Abdomen) Midurethral sling and cystoscopy on 9/15/20 by Iris Murillo and Ranjana in treatment/management of urinary incontinence and BRCA2 gene mutation.  PAC referral for risk assessment and optimization for anesthesia with comorbid conditions of: allergic rhinitis, depression and seasonal affective disorder.      Pre-operative considerations:  1.  Cardiac:  Functional status- METS >4.  She runs 3-4 days per week and walks 3-4 days per week for exercise.  She has no known cardiac conditions.   Intermediate risk surgery with 0.9% risk of major adverse cardiac event.   2.  Pulm:  Airway feasible.  PANCHITO risk: low.  3.  GI:  Risk of PONV score = 3.  If > 2, anti-emetic intervention recommended.  4.  Musculoskeletal:  She feels she may have right elbow tendonitis.  Consider cautious positioning.     VTE risk: 0.26%      Virtual visit - Please refer to the physical examination documented by the anesthesiologist in the anesthesia record on the day of surgery      Patient is optimized and is acceptable candidate for the proposed procedure.  No further diagnostic evaluation is needed.     **For further details of assessment, testing, and physical exam please see H and P completed on same date.          Sienna Salmon DNP, RN, APRN      Reviewed and Signed by PAC Mid-Level Provider/Resident  Mid-Level Provider/Resident: Sienna Salmon DNP, RN, APRN  Date: 9/8/20  Time: 1106    Attending Anesthesiologist Anesthesia Assessment:        Anesthesiologist:   Date:   Time:   Pass/Fail:   Disposition:     PAC Pharmacist Assessment:        Pharmacist:   Date:   Time:    HENRIQUE Morales CNP

## 2020-09-08 NOTE — PROGRESS NOTES
"Debbie Baker is a 45 year old female who is being evaluated via a billable video visit.      The patient has been notified of following:     \"This video visit will be conducted via a call between you and your physician/provider. We have found that certain health care needs can be provided without the need for an in-person physical exam.  This service lets us provide the care you need with a video conversation.  If a prescription is necessary we can send it directly to your pharmacy.  If lab work is needed we can place an order for that and you can then stop by our lab to have the test done at a later time.    Video visits are billed at different rates depending on your insurance coverage.  Please reach out to your insurance provider with any questions.    If during the course of the call the physician/provider feels a video visit is not appropriate, you will not be charged for this service.\"    Patient has given verbal consent for Video visit? Yes  How would you like to obtain your AVS? Mychart    Will anyone else be joining your video visit? No      ELLEN Saucedo LPN        "

## 2020-09-09 LAB
BACTERIA SPEC CULT: NO GROWTH
Lab: NORMAL
SPECIMEN SOURCE: NORMAL

## 2020-09-10 NOTE — TELEPHONE ENCOUNTER
Message left on the patient's voicemail stating that her urine culture is negative.    Kel Castillo MA

## 2020-09-11 NOTE — RESULT ENCOUNTER NOTE
Gutierrez Lewis,    Your test results are attached.  All of your labs are normal.         Sienna Salmon DNP, RN, ANP-C

## 2020-09-12 DIAGNOSIS — Z11.59 ENCOUNTER FOR SCREENING FOR OTHER VIRAL DISEASES: ICD-10-CM

## 2020-09-12 PROCEDURE — U0003 INFECTIOUS AGENT DETECTION BY NUCLEIC ACID (DNA OR RNA); SEVERE ACUTE RESPIRATORY SYNDROME CORONAVIRUS 2 (SARS-COV-2) (CORONAVIRUS DISEASE [COVID-19]), AMPLIFIED PROBE TECHNIQUE, MAKING USE OF HIGH THROUGHPUT TECHNOLOGIES AS DESCRIBED BY CMS-2020-01-R: HCPCS | Performed by: UROLOGY

## 2020-09-14 LAB
SARS-COV-2 RNA SPEC QL NAA+PROBE: NOT DETECTED
SPECIMEN SOURCE: NORMAL

## 2020-09-15 ENCOUNTER — ANESTHESIA (OUTPATIENT)
Dept: SURGERY | Facility: CLINIC | Age: 45
End: 2020-09-15
Payer: COMMERCIAL

## 2020-09-15 ENCOUNTER — PATIENT OUTREACH (OUTPATIENT)
Dept: CARE COORDINATION | Facility: CLINIC | Age: 45
End: 2020-09-15

## 2020-09-15 ENCOUNTER — HOSPITAL ENCOUNTER (OUTPATIENT)
Facility: CLINIC | Age: 45
Discharge: HOME | End: 2020-09-15
Attending: OBSTETRICS & GYNECOLOGY | Admitting: OBSTETRICS & GYNECOLOGY
Payer: COMMERCIAL

## 2020-09-15 ENCOUNTER — ANCILLARY PROCEDURE (OUTPATIENT)
Dept: ULTRASOUND IMAGING | Facility: CLINIC | Age: 45
End: 2020-09-15
Payer: COMMERCIAL

## 2020-09-15 ENCOUNTER — SURGERY (OUTPATIENT)
Age: 45
End: 2020-09-15
Payer: COMMERCIAL

## 2020-09-15 VITALS
OXYGEN SATURATION: 100 % | DIASTOLIC BLOOD PRESSURE: 74 MMHG | HEART RATE: 57 BPM | RESPIRATION RATE: 16 BRPM | SYSTOLIC BLOOD PRESSURE: 115 MMHG | HEIGHT: 62 IN | BODY MASS INDEX: 26.13 KG/M2 | TEMPERATURE: 98 F | WEIGHT: 141.98 LBS

## 2020-09-15 DIAGNOSIS — Z15.09 BRCA2 GENE MUTATION POSITIVE IN FEMALE: Primary | ICD-10-CM

## 2020-09-15 DIAGNOSIS — Z15.01 BRCA2 GENE MUTATION POSITIVE IN FEMALE: ICD-10-CM

## 2020-09-15 DIAGNOSIS — Z15.02 BRCA2 GENE MUTATION POSITIVE IN FEMALE: Primary | ICD-10-CM

## 2020-09-15 DIAGNOSIS — Z15.09 BRCA2 GENE MUTATION POSITIVE IN FEMALE: ICD-10-CM

## 2020-09-15 DIAGNOSIS — Z90.722 S/P BSO (BILATERAL SALPINGO-OOPHORECTOMY): ICD-10-CM

## 2020-09-15 DIAGNOSIS — Z15.02 BRCA2 GENE MUTATION POSITIVE IN FEMALE: ICD-10-CM

## 2020-09-15 DIAGNOSIS — Z15.01 BRCA2 GENE MUTATION POSITIVE IN FEMALE: Primary | ICD-10-CM

## 2020-09-15 LAB
ABO + RH BLD: NORMAL
ABO + RH BLD: NORMAL
BLD GP AB SCN SERPL QL: NORMAL
BLOOD BANK CMNT PATIENT-IMP: NORMAL
GLUCOSE BLDC GLUCOMTR-MCNC: 96 MG/DL (ref 70–99)
HCG UR QL: NEGATIVE
SPECIMEN EXP DATE BLD: NORMAL

## 2020-09-15 PROCEDURE — 25000128 H RX IP 250 OP 636: Performed by: STUDENT IN AN ORGANIZED HEALTH CARE EDUCATION/TRAINING PROGRAM

## 2020-09-15 PROCEDURE — 82962 GLUCOSE BLOOD TEST: CPT

## 2020-09-15 PROCEDURE — 25000565 ZZH ISOFLURANE, EA 15 MIN: Performed by: OBSTETRICS & GYNECOLOGY

## 2020-09-15 PROCEDURE — C1771 REP DEV, URINARY, W/SLING: HCPCS | Performed by: OBSTETRICS & GYNECOLOGY

## 2020-09-15 PROCEDURE — 88307 TISSUE EXAM BY PATHOLOGIST: CPT | Performed by: OBSTETRICS & GYNECOLOGY

## 2020-09-15 PROCEDURE — 40000170 ZZH STATISTIC PRE-PROCEDURE ASSESSMENT II: Performed by: OBSTETRICS & GYNECOLOGY

## 2020-09-15 PROCEDURE — 37000009 ZZH ANESTHESIA TECHNICAL FEE, EACH ADDTL 15 MIN: Performed by: OBSTETRICS & GYNECOLOGY

## 2020-09-15 PROCEDURE — 25000125 ZZHC RX 250: Performed by: NURSE ANESTHETIST, CERTIFIED REGISTERED

## 2020-09-15 PROCEDURE — 37000008 ZZH ANESTHESIA TECHNICAL FEE, 1ST 30 MIN: Performed by: OBSTETRICS & GYNECOLOGY

## 2020-09-15 PROCEDURE — 00000155 ZZHCL STATISTIC H-CELL BLOCK W/STAIN: Performed by: OBSTETRICS & GYNECOLOGY

## 2020-09-15 PROCEDURE — 25000128 H RX IP 250 OP 636: Performed by: NURSE ANESTHETIST, CERTIFIED REGISTERED

## 2020-09-15 PROCEDURE — 58661 LAPAROSCOPY REMOVE ADNEXA: CPT | Mod: GC | Performed by: OBSTETRICS & GYNECOLOGY

## 2020-09-15 PROCEDURE — 25000128 H RX IP 250 OP 636: Performed by: UROLOGY

## 2020-09-15 PROCEDURE — 71000014 ZZH RECOVERY PHASE 1 LEVEL 2 FIRST HR: Performed by: OBSTETRICS & GYNECOLOGY

## 2020-09-15 PROCEDURE — 36000057 ZZH SURGERY LEVEL 3 1ST 30 MIN - UMMC: Performed by: OBSTETRICS & GYNECOLOGY

## 2020-09-15 PROCEDURE — 86900 BLOOD TYPING SEROLOGIC ABO: CPT | Performed by: OBSTETRICS & GYNECOLOGY

## 2020-09-15 PROCEDURE — 86850 RBC ANTIBODY SCREEN: CPT | Performed by: OBSTETRICS & GYNECOLOGY

## 2020-09-15 PROCEDURE — 27210794 ZZH OR GENERAL SUPPLY STERILE: Performed by: OBSTETRICS & GYNECOLOGY

## 2020-09-15 PROCEDURE — 25000125 ZZHC RX 250: Performed by: UROLOGY

## 2020-09-15 PROCEDURE — 81025 URINE PREGNANCY TEST: CPT | Performed by: ANESTHESIOLOGY

## 2020-09-15 PROCEDURE — 25000125 ZZHC RX 250: Performed by: STUDENT IN AN ORGANIZED HEALTH CARE EDUCATION/TRAINING PROGRAM

## 2020-09-15 PROCEDURE — 25000132 ZZH RX MED GY IP 250 OP 250 PS 637: Performed by: OBSTETRICS & GYNECOLOGY

## 2020-09-15 PROCEDURE — 88305 TISSUE EXAM BY PATHOLOGIST: CPT | Performed by: OBSTETRICS & GYNECOLOGY

## 2020-09-15 PROCEDURE — 36000059 ZZH SURGERY LEVEL 3 EA 15 ADDTL MIN UMMC: Performed by: OBSTETRICS & GYNECOLOGY

## 2020-09-15 PROCEDURE — 88112 CYTOPATH CELL ENHANCE TECH: CPT | Performed by: OBSTETRICS & GYNECOLOGY

## 2020-09-15 PROCEDURE — 25000132 ZZH RX MED GY IP 250 OP 250 PS 637: Performed by: STUDENT IN AN ORGANIZED HEALTH CARE EDUCATION/TRAINING PROGRAM

## 2020-09-15 PROCEDURE — 71000027 ZZH RECOVERY PHASE 2 EACH 15 MINS: Performed by: OBSTETRICS & GYNECOLOGY

## 2020-09-15 PROCEDURE — 86901 BLOOD TYPING SEROLOGIC RH(D): CPT | Performed by: OBSTETRICS & GYNECOLOGY

## 2020-09-15 PROCEDURE — 25800030 ZZH RX IP 258 OP 636: Performed by: NURSE ANESTHETIST, CERTIFIED REGISTERED

## 2020-09-15 DEVICE — SLING SOLYX TRANSVAGINAL MESH M0068507000: Type: IMPLANTABLE DEVICE | Site: VAGINA | Status: FUNCTIONAL

## 2020-09-15 RX ORDER — ONDANSETRON 2 MG/ML
INJECTION INTRAMUSCULAR; INTRAVENOUS PRN
Status: DISCONTINUED | OUTPATIENT
Start: 2020-09-15 | End: 2020-09-15

## 2020-09-15 RX ORDER — FENTANYL CITRATE 50 UG/ML
INJECTION, SOLUTION INTRAMUSCULAR; INTRAVENOUS PRN
Status: DISCONTINUED | OUTPATIENT
Start: 2020-09-15 | End: 2020-09-15

## 2020-09-15 RX ORDER — FENTANYL CITRATE 50 UG/ML
25-50 INJECTION, SOLUTION INTRAMUSCULAR; INTRAVENOUS
Status: DISCONTINUED | OUTPATIENT
Start: 2020-09-15 | End: 2020-09-15 | Stop reason: HOSPADM

## 2020-09-15 RX ORDER — PROPOFOL 10 MG/ML
INJECTION, EMULSION INTRAVENOUS PRN
Status: DISCONTINUED | OUTPATIENT
Start: 2020-09-15 | End: 2020-09-15

## 2020-09-15 RX ORDER — CEFAZOLIN SODIUM 2 G/100ML
2 INJECTION, SOLUTION INTRAVENOUS
Status: DISCONTINUED | OUTPATIENT
Start: 2020-09-15 | End: 2020-09-15 | Stop reason: HOSPADM

## 2020-09-15 RX ORDER — ACETAMINOPHEN 325 MG/1
975 TABLET ORAL ONCE
Status: COMPLETED | OUTPATIENT
Start: 2020-09-15 | End: 2020-09-15

## 2020-09-15 RX ORDER — ONDANSETRON 4 MG/1
4 TABLET, ORALLY DISINTEGRATING ORAL EVERY 30 MIN PRN
Status: DISCONTINUED | OUTPATIENT
Start: 2020-09-15 | End: 2020-09-15 | Stop reason: HOSPADM

## 2020-09-15 RX ORDER — NALOXONE HYDROCHLORIDE 0.4 MG/ML
.1-.4 INJECTION, SOLUTION INTRAMUSCULAR; INTRAVENOUS; SUBCUTANEOUS
Status: DISCONTINUED | OUTPATIENT
Start: 2020-09-15 | End: 2020-09-15 | Stop reason: HOSPADM

## 2020-09-15 RX ORDER — BUPIVACAINE HYDROCHLORIDE 2.5 MG/ML
INJECTION, SOLUTION EPIDURAL; INFILTRATION; INTRACAUDAL PRN
Status: DISCONTINUED | OUTPATIENT
Start: 2020-09-15 | End: 2020-09-15

## 2020-09-15 RX ORDER — IBUPROFEN 200 MG
600 TABLET ORAL
Status: DISCONTINUED | OUTPATIENT
Start: 2020-09-15 | End: 2020-09-15 | Stop reason: HOSPADM

## 2020-09-15 RX ORDER — SODIUM CHLORIDE, SODIUM LACTATE, POTASSIUM CHLORIDE, CALCIUM CHLORIDE 600; 310; 30; 20 MG/100ML; MG/100ML; MG/100ML; MG/100ML
INJECTION, SOLUTION INTRAVENOUS CONTINUOUS
Status: DISCONTINUED | OUTPATIENT
Start: 2020-09-15 | End: 2020-09-15 | Stop reason: HOSPADM

## 2020-09-15 RX ORDER — LIDOCAINE 40 MG/G
CREAM TOPICAL
Status: DISCONTINUED | OUTPATIENT
Start: 2020-09-15 | End: 2020-09-15 | Stop reason: HOSPADM

## 2020-09-15 RX ORDER — DEXAMETHASONE SODIUM PHOSPHATE 4 MG/ML
INJECTION, SOLUTION INTRA-ARTICULAR; INTRALESIONAL; INTRAMUSCULAR; INTRAVENOUS; SOFT TISSUE PRN
Status: DISCONTINUED | OUTPATIENT
Start: 2020-09-15 | End: 2020-09-15

## 2020-09-15 RX ORDER — ACETAMINOPHEN 325 MG/1
325-650 TABLET ORAL EVERY 6 HOURS PRN
COMMUNITY
Start: 2020-09-15 | End: 2020-10-26

## 2020-09-15 RX ORDER — SODIUM CHLORIDE, SODIUM LACTATE, POTASSIUM CHLORIDE, CALCIUM CHLORIDE 600; 310; 30; 20 MG/100ML; MG/100ML; MG/100ML; MG/100ML
INJECTION, SOLUTION INTRAVENOUS CONTINUOUS PRN
Status: DISCONTINUED | OUTPATIENT
Start: 2020-09-15 | End: 2020-09-15

## 2020-09-15 RX ORDER — CEFAZOLIN SODIUM 1 G/3ML
1 INJECTION, POWDER, FOR SOLUTION INTRAMUSCULAR; INTRAVENOUS SEE ADMIN INSTRUCTIONS
Status: DISCONTINUED | OUTPATIENT
Start: 2020-09-15 | End: 2020-09-15 | Stop reason: HOSPADM

## 2020-09-15 RX ORDER — HYDROMORPHONE HYDROCHLORIDE 1 MG/ML
.3-.5 INJECTION, SOLUTION INTRAMUSCULAR; INTRAVENOUS; SUBCUTANEOUS EVERY 10 MIN PRN
Status: DISCONTINUED | OUTPATIENT
Start: 2020-09-15 | End: 2020-09-15 | Stop reason: HOSPADM

## 2020-09-15 RX ORDER — DEXAMETHASONE SODIUM PHOSPHATE 10 MG/ML
INJECTION, SOLUTION INTRAMUSCULAR; INTRAVENOUS PRN
Status: DISCONTINUED | OUTPATIENT
Start: 2020-09-15 | End: 2020-09-15

## 2020-09-15 RX ORDER — OXYCODONE HYDROCHLORIDE 5 MG/1
5 TABLET ORAL
Status: COMPLETED | OUTPATIENT
Start: 2020-09-15 | End: 2020-09-15

## 2020-09-15 RX ORDER — IBUPROFEN 600 MG/1
600 TABLET, FILM COATED ORAL EVERY 6 HOURS PRN
COMMUNITY
Start: 2020-09-15

## 2020-09-15 RX ORDER — OXYCODONE HYDROCHLORIDE 5 MG/1
5 TABLET ORAL EVERY 6 HOURS PRN
Qty: 6 TABLET | Refills: 0 | Status: SHIPPED | OUTPATIENT
Start: 2020-09-15 | End: 2020-09-18

## 2020-09-15 RX ORDER — MEPERIDINE HYDROCHLORIDE 25 MG/ML
12.5 INJECTION INTRAMUSCULAR; INTRAVENOUS; SUBCUTANEOUS
Status: DISCONTINUED | OUTPATIENT
Start: 2020-09-15 | End: 2020-09-15 | Stop reason: HOSPADM

## 2020-09-15 RX ORDER — ONDANSETRON 2 MG/ML
4 INJECTION INTRAMUSCULAR; INTRAVENOUS EVERY 30 MIN PRN
Status: DISCONTINUED | OUTPATIENT
Start: 2020-09-15 | End: 2020-09-15 | Stop reason: HOSPADM

## 2020-09-15 RX ORDER — LIDOCAINE HYDROCHLORIDE 20 MG/ML
INJECTION, SOLUTION INFILTRATION; PERINEURAL PRN
Status: DISCONTINUED | OUTPATIENT
Start: 2020-09-15 | End: 2020-09-15

## 2020-09-15 RX ORDER — BUPIVACAINE HYDROCHLORIDE AND EPINEPHRINE 2.5; 5 MG/ML; UG/ML
INJECTION, SOLUTION INFILTRATION; PERINEURAL PRN
Status: DISCONTINUED | OUTPATIENT
Start: 2020-09-15 | End: 2020-09-15 | Stop reason: HOSPADM

## 2020-09-15 RX ORDER — FLUMAZENIL 0.1 MG/ML
0.2 INJECTION, SOLUTION INTRAVENOUS
Status: DISCONTINUED | OUTPATIENT
Start: 2020-09-15 | End: 2020-09-15 | Stop reason: HOSPADM

## 2020-09-15 RX ADMIN — PROPOFOL 100 MG: 10 INJECTION, EMULSION INTRAVENOUS at 08:20

## 2020-09-15 RX ADMIN — LIDOCAINE HYDROCHLORIDE 40 MG: 20 INJECTION, SOLUTION INFILTRATION; PERINEURAL at 08:20

## 2020-09-15 RX ADMIN — CEFAZOLIN 2 G: 1 INJECTION, POWDER, FOR SOLUTION INTRAMUSCULAR; INTRAVENOUS at 08:35

## 2020-09-15 RX ADMIN — FENTANYL CITRATE 50 MCG: 50 INJECTION, SOLUTION INTRAMUSCULAR; INTRAVENOUS at 08:25

## 2020-09-15 RX ADMIN — MIDAZOLAM 1 MG: 1 INJECTION INTRAMUSCULAR; INTRAVENOUS at 07:52

## 2020-09-15 RX ADMIN — BUPIVACAINE HYDROCHLORIDE AND EPINEPHRINE BITARTRATE 7 ML: 2.5; .005 INJECTION, SOLUTION INFILTRATION; PERINEURAL at 09:15

## 2020-09-15 RX ADMIN — ONDANSETRON 4 MG: 2 INJECTION INTRAMUSCULAR; INTRAVENOUS at 09:25

## 2020-09-15 RX ADMIN — OXYCODONE HYDROCHLORIDE 5 MG: 5 TABLET ORAL at 11:03

## 2020-09-15 RX ADMIN — ACETAMINOPHEN 975 MG: 325 TABLET, FILM COATED ORAL at 07:06

## 2020-09-15 RX ADMIN — SODIUM CHLORIDE, POTASSIUM CHLORIDE, SODIUM LACTATE AND CALCIUM CHLORIDE: 600; 310; 30; 20 INJECTION, SOLUTION INTRAVENOUS at 08:06

## 2020-09-15 RX ADMIN — MIDAZOLAM 2 MG: 1 INJECTION INTRAMUSCULAR; INTRAVENOUS at 08:06

## 2020-09-15 RX ADMIN — FENTANYL CITRATE 50 MCG: 50 INJECTION, SOLUTION INTRAMUSCULAR; INTRAVENOUS at 09:08

## 2020-09-15 RX ADMIN — DEXAMETHASONE SODIUM PHOSPHATE 2 MG: 10 INJECTION, SOLUTION INTRAMUSCULAR; INTRAVENOUS at 07:50

## 2020-09-15 RX ADMIN — FENTANYL CITRATE 50 MCG: 50 INJECTION INTRAMUSCULAR; INTRAVENOUS at 07:52

## 2020-09-15 RX ADMIN — Medication 40 MCG: at 07:50

## 2020-09-15 RX ADMIN — BUPIVACAINE HYDROCHLORIDE 60 ML: 2.5 INJECTION, SOLUTION EPIDURAL; INFILTRATION; INTRACAUDAL; PERINEURAL at 07:50

## 2020-09-15 RX ADMIN — DEXAMETHASONE SODIUM PHOSPHATE 6 MG: 4 INJECTION, SOLUTION INTRA-ARTICULAR; INTRALESIONAL; INTRAMUSCULAR; INTRAVENOUS; SOFT TISSUE at 08:20

## 2020-09-15 RX ADMIN — ROCURONIUM BROMIDE 50 MG: 10 INJECTION INTRAVENOUS at 08:20

## 2020-09-15 RX ADMIN — SUGAMMADEX 200 MG: 100 INJECTION, SOLUTION INTRAVENOUS at 09:34

## 2020-09-15 ASSESSMENT — MIFFLIN-ST. JEOR: SCORE: 1237.25

## 2020-09-15 NOTE — OR NURSING
Discharge instructions reviewed with patient at bedside and  [place] via telephone. Questions answered and phone numbers provided for follow up questions.

## 2020-09-15 NOTE — ANESTHESIA PROCEDURE NOTES
Peripheral Nerve Block Procedure Note  Staff -   Anesthesiologist:  Travis Stevens MD  Resident/Fellow: Lenore Burr MD    Performed By: resident        Location: Pre-op  Procedure Start/Stop TImes:     patient identified, IV checked, site marked, risks and benefits discussed, informed consent, monitors and equipment checked, pre-op evaluation, at physician/surgeon's request and post-op pain management      Correct Patient: Yes      Correct Position: Yes      Correct Site: Yes      Correct Procedure: Yes      Correct Laterality:  Yes    Site Marked:  Yes  Procedure details:     Procedure:  TAP    Diagnosis:  Post operative pain    Laterality:  Bilateral    Position:  Supine    Sterile Prep: chloraprep, mask and sterile gloves      Local skin infiltration:  None    Needle:  Short bevel    Needle gauge:  21    Needle length (mm):  110    Ultrasound: Yes      Ultrasound used to identify targeted nerve, plexus, or vascular structure and placed a needle adjacent to it      Permanent Image entered into patiient's record      Abnormal pain on injection: No      Blood Aspirated: No      Paresthesias:  No    Bleeding at site: No      Bolus via:  Needle    Infusion Method:  Single Shot    Complications:  None  Assessment/Narrative:     Injection made incrementally with aspirations every (mL):  5

## 2020-09-15 NOTE — DISCHARGE INSTRUCTIONS
[facility]  Same-Day Surgery   Adult Discharge Orders & Instructions   For 24 hours after surgery    1. Get plenty of rest.  A responsible adult must stay with you for at least 24 hours after you leave the hospital.   2. Do not drive or use heavy equipment.  If you have weakness or tingling, don't drive or use heavy equipment until this feeling goes away.  3. Do not drink alcohol.  4. Avoid strenuous or risky activities.  Ask for help when climbing stairs.   5. You may feel lightheaded.  IF so, sit for a few minutes before standing.  Have someone help you get up.   6. If you have nausea (feel sick to your stomach): Drink only clear liquids such as apple juice, ginger ale, broth or 7-Up.  Rest may also help.  Be sure to drink enough fluids.  Move to a regular diet as you feel able.  7. You may have a slight fever. Call the doctor if your fever is over 100 F (37.7 C) (taken under the tongue) or lasts longer than 24 hours.  8. You may have a dry mouth, a sore throat, muscle aches or trouble sleeping.  These should go away after 24 hours.  9. Do not make important or legal decisions.   Call your doctor for any of the followin.  Signs of infection (fever, growing tenderness at the surgery site, a large amount of drainage or bleeding, severe pain, foul-smelling drainage, redness, swelling).    2. It has been over 8 to 10 hours since surgery and you are still not able to urinate (pass water).    3.  Headache for over 24 hours.    To contact a doctor, call Dr. Murillo's clinic at [phone] or:     Call the hospital  at [phone] and ask for the resident on call for Gynecology (answered 24 hours a day)    OR  Dr. Chilel's clinic at [phone] during business hours or:                Call the hospital  at [phone] and ask for the resident on call for Urology (answered 24 hours a day)        Emergency Department:    [facility]: [phone]        (TTY for hearing impaired: [phone])

## 2020-09-15 NOTE — ANESTHESIA POSTPROCEDURE EVALUATION
Anesthesia POST Procedure Evaluation    Patient: Debbie Baker   MRN:     [id number] Gender:   female   Age:    45 year old :      1975        Preoperative Diagnosis: BRCA2 gene mutation positive in female [Z15.01, Z15.02, Z15.09]   Procedure(s):  laparoscopic bilateral salpingo-oophorectomy, pelvic washings  Midurethral sling and cystoscopy   Postop Comments: No value filed.     Anesthesia Type: General          Postop Pain Control: Uneventful            Sign Out: Well controlled pain   PONV: No   Neuro/Psych: Uneventful            Sign Out: Acceptable/Baseline neuro status   Airway/Respiratory: Uneventful            Sign Out: Acceptable/Baseline resp. status   CV/Hemodynamics: Uneventful            Sign Out: Acceptable CV status   Other NRE: NONE   DID A NON-ROUTINE EVENT OCCUR? No    Event details/Postop Comments:  Awake, comfortable, conversant; satisfactory recovery from GA.    Faby Guillen MD  9/15/2020  10:34 AM         Last Anesthesia Record Vitals:  CRNA VITALS  9/15/2020 0912 - 9/15/2020 1012      9/15/2020             Pulse:  70    SpO2:  100 %    Resp Rate (observed):  (!) 1          Last PACU Vitals:  Vitals Value Taken Time   /68 9/15/2020 10:30 AM   Temp 36.4  C (97.5  F) 9/15/2020 10:15 AM   Pulse 52 9/15/2020 10:33 AM   Resp 16 9/15/2020 10:15 AM   SpO2 100 % 9/15/2020 10:33 AM   Temp src     NIBP     Pulse     SpO2     Resp     Temp     Ht Rate     Temp 2     Vitals shown include unvalidated device data.      Electronically Signed By: Faby Guillen MD, September 15, 2020, 10:34 AM

## 2020-09-15 NOTE — BRIEF OP NOTE
[facility], [place]    Brief Operative Note    Pre-operative diagnosis:   -BRCA2 gene mutation positive in female  -Stress urinary incontinence  Post-operative diagnosis Same as pre-operative diagnosis    Procedure: Procedure(s):  laparoscopic bilateral salpingo-oophorectomy, pelvic washings  Midurethral sling and cystoscopy  Surgeon: Surgeon(s) and Role:  Panel 1:     * Celeste Murillo MD - Primary     * Pamela Gil MD - Resident - Assisting  Panel 2:     * Pamela Chilel MD - Primary     * Travis Lopez MD - Resident - Assisting  Anesthesia: Combined General with Block   Estimated blood loss: 10cc  Drains: None  Specimens:   ID Type Source Tests Collected by Time Destination   1 : pelvic washings (BRCA2 possitive) Washings Pelvis CYTOLOGY NON GYN Celeste Murillo MD 9/15/2020  9:12 AM    A : bilateral fallopian tubes and ovaries (BRCA 2 possitive) Tissue Fallopian Tube and Ovary, Bilateral SURGICAL PATHOLOGY EXAM Celeste Murillo MD 9/15/2020  9:16 AM      Findings: On exam under anesthesia, normal external female genitalia, uterus anteverted, mobile. Cervix palpates normal without masses. On laparoscopy normal upper abdominal survey. Normal appearing uterus, fallopian tubes, and ovaries. Surgical pedicles hemostatic on conclusion of case. See Dr. Chilel's note for details on their portion of the procedure.   Complications: None.  Implants:   Implant Name Type Inv. Item Serial No.  Lot No. LRB No. Used Action   SLING SOLYX TRANSVAGINAL MESH [id number] Mesh SLING SOLYX TRANSVAGINAL MESH [id number] TRANSVAGINAL SLING [employer] [id number] N/A 1 Implanted       Pamela Gil MD   OB/GYN PGY-3  9/15/2020 9:45 AM

## 2020-09-15 NOTE — OP NOTE
PREOPERATIVE DIAGNOSES: 1. Stress urinary incontinence. 2. Urethral hypermobility.  POSTOPERATIVE DIAGNOSES: Same  PROCEDURE PERFORMED: 1. Mid urethral sling ([employer] Solyx). 2. Cystoscopy.     ATTENDING PHYSICIAN: Pamela Chilel MD   RESIDENT SURGEON: Travis Lopez MD  ANESTHESIA: Monitored anesthesia care with 5 mL of 1% lidocaine with epinephrine and 10 mL Xylocaine jelly per urethra.   ESTIMATED BLOOD LOSS: 10 mL.   FINDINGS:  No mesh in the bladder  SPECIMENS:  None      HISTORY OF PRESENT ILLNESS: Debbie Baker is a 45 year old year-old woman with a history of stress urinary incontinence. Differing management options were discussed with the patient and she has elected to proceed with a mid urethral sling.    DESCRIPTION OF PROCEDURE: After informed consent was obtained, the patient was identified, marked and taken to the operating room in her usual state of health. After induction with monitored anesthesia care, she was positioned in modified lithotomy position. Pneumo boots were placed. A timeout was performed to ensure proper patient, procedure and positioning. The patient received 1 gram of Ancef prior to initiation of surgery. She was prepped with Betadine. Xylocaine jelly of 10 mL were inserted into the urethra and a 18-[place] Zealander Rowell catheter was placed.     An Allis clamp was used to grasp the vaginal mucosa approximately 1 cm from the urethral meatus at the mid urethra. A 25-gauge needle, we injected 10 mL of 1% lidocaine with epinephrine to hydrodissect the tissues. We then made a 1-cm incision through the vaginal mucosa with a black-handled Metzenbaum scissors. We then dissected this laterally out passed the fornices of the vagina in the pubocervical fascia, out laterally to the pelvic side wall, first on the right-hand side and then on the left hand side. We then placed the Solyx sling on the trocar and placed this first through the obturator membrane on the left side and then deployed  it and then placed it on the right hand side and deployed it. The sling lay flat.  The string was used to place it under appropriate tension.  We then placed a 20-Vietnamese cystoscope through the urethra and into the bladder, noting bilateral ureteral orifices. The floor of the bladder was intact as well as the bladder neck and the lateral edges, no mesh was seen. The urethra was without tape. We then withdrew the cystoscope with a full bladder and pressed on the suprapubic region and did not note any incontinence from the urethral meatus. At this time the string was cut and the wound was irrigated.  A running 2-0 Vicryl was then used to reapproximate the mucosa of the vagina. The patient was then returned to the supine position and transported to recovery in stable condition.     ASSESSMENT AND PLAN: Debbie Baker is a 45 year old year-old woman who underwent a midurethral sling for stress urinary incontinence. We will plan to follow up with her in 2 weeks in Urology Clinic. Obtain residual urine and evaluate for obstructive urinary symptoms.     Travis Lopez MD  Urology PGY3     Patient was seen, evaluated and plan was formulated in conjunction with me and I agree with the above.  I was present for the entire procedure.  Pamela Chilel MD

## 2020-09-15 NOTE — OP NOTE
[facility]  Full Operative Note    Date of Service:  9/15/2020    Surgeon:   Panel 1: Celeste Murillo MD  -Assistant: Pamela Gil MD PGY-3    Panel 2: Pamela Chilel MD  -Assistant: Travis Lopez MD      Preop Dx:   -BRCA 2 gene mutation  -Stress urinary incontinence    Postop Dx:   -Same    Procedure: Laparoscopic bilateral salpingo-oophorectomy, pelvic washings (GYN Oncology)    Midurethral sling and cystoscopy (Urology)    Anesthesia: General, TAPS block  IVF: 1100 mL crystalloid  EBL: 10 mL  UOP: Not recorded  Drains: None  Specimens: Bilateral fallopian tubes and ovaries  Complications: None apparent    Indications: Ms. Debbie Baker is a 45 year old with a family history significant for breast and ovarian cancer and personal diagnosis of  BRCA 2 gene mutation diagnosed in 2/2016. As she has completed childbearing she was recommended to proceed with risk reducing bilateral salpingo-oophorectomy. She declined hysterectomy. She also has a history of stress urinary incontinence and elected to proceed with a midurethral sling with Urology at the time of risk reducing BSO. The risks, benefits, and alternatives to the procedure were discussed and she agreed to proceed.     Findings: On exam under anesthesia, normal external female genitalia, uterus anteverted, mobile. Cervix palpates normal without masses. On laparoscopy normal upper abdominal survey. Normal appearing uterus, fallopian tubes, and ovaries. Surgical pedicles hemostatic at conclusion of case. See Dr. Chilel's note for details regarding their portion of the procedure.     Procedure Details:  The patient was brought to the operating room, where adequate general endotracheal anesthesia was administered. She was placed in the dorsal lithotomy position. Exam under anesthesia revealed the findings noted above. She was prepped and draped in the usual sterile fashion. Surgical time out was performed. Rowell catheter was placed  by Urology. The umbilicus was everted with an Allis clamp, and a 5 mm incision was made through the umbilicus with the scalpel. The Veress needle was placed, and intraperitoneal placement was suggested with the water drop test and an opening pressure of <5 mm Hg. The Veress needle was removed and a 5 mm trocar was placed. Laparoscope was placed and revealed no trauma from entry. Survey of the abdomen revealed the findings noted above. Attention was turned to the right lower quadrant, where a 5 mm skin incision was made at a point 4 cm superomedial to the ASIS, and a 5 mm trocar was placed atraumatically under direct visualization. Attention was turned to the left lower quadrant a 10 mm incision was made at a point 4 cm superomedial to the ASIS and a 10 mm trocar was placed atraumatically. Pelvic washings were obtained. The left ureter was visualized transperitoneally along the pelvic side wall. The left infundibulopelvic ligament was cauterized and ligated with the LigaSure with care to avoid the ureter. The peritoneal attachments were then serially cauterized and ligated with the LigaSure. The fallopian tube and uteroovarian ligament were then cauterized and transected with the LigaSure, amputating the left tube and ovary with at least a 2 cm pedicle. The surgical site appeared hemostatic. Care was taken to ligate the IP ligament 2-3 cm proximal to the ovary and completely resect the fallopian tube. The right ureter was visualized transperitoneally along the pelvic side wall. The right fallopian tube and ovary were removed in a similar fashion, again with care to ligate the IP ligament 2-3 cm proximal to the ovary and completely resect the fallopian tube The surgical site appeared hemostatic. The bilateral fallopian tubes and ovaries were removed with the EndoCatch bag. The pelvis was irrigated. Surgical sites were reexamined and noted to be hemostatic. The ureters were also re-examined and noted to be  vermiculating transperitoneally. The 10 mm fascial incision was closed with a 0 Vicryl suture using the Francisco J-Chtio device. The abdomen was desufflated and trocars were removed. The skin incisions were closed with 4-0 Vicryl and covered with steri-strips and a sterile dressing. The brady catheter was removed at the end of the case. Please see Dr. Chilel's operative note for details on their portion of the procedure.    All sponge, needle, and instrument counts were correct. The patient tolerated the procedure well and was transferred to recovery in stable condition. Dr. Murillo was present and scrubbed for the entire procedure.    Pamela Gil MD  Ob/Gyn PGY-2  9/15/2020 10:28 AM     Celeste Murillo MD    Department of Ob/Gyn and Women's Health  Division of Gynecologic Oncology  [facility]  [phone]    I was scrubbed and present for the entire procedure.

## 2020-09-15 NOTE — PROGRESS NOTES
Discussed plan for BRCA2+ status, plan laparoscopic BSO, washings. Possible open abdomen possible cancer staging. Risks including infection, bleeding, damage to surrounding organs as well as benefits, and alternatives were explained in detail to the patient who elicited understanding.    Plan outlined as above and patient in agreement to plan.    Celeste Murillo MD    Department of Ob/Gyn and Women's Health  Division of Gynecologic Oncology  [facility]  [phone]

## 2020-09-15 NOTE — PROGRESS NOTES
"[facility]  Postoperative Check  9/15/2020    S: Doing well. Had 400 PVR after voiding and brady will be replaced per Urology. Pain is adequately controlled. Tolerating PO without nausea or vomiting. Ambulated to the bathroom. Feeling ready to discharge.    O:   /71   Pulse 67   Temp 97.5  F (36.4  C) (Oral)   Resp 16   Ht 1.575 m (5' 2\")   Wt 64.4 kg (141 lb 15.6 oz)   LMP 08/24/2020 (Approximate)   SpO2 100%   BMI 25.97 kg/m    Gen: Alert, well appearing, NAD  CV: well perfused  Resp: Unlabored breathing on RA  Abd: Soft, nondistended, minimally tender to palpation. Primapore dressings in place x3, small area of shadowing on LLQ dressing      A: 45 year old female POD#0 s/p laparoscopic bilateral salpingo-oophorectomy, pelvic washings (GYN Oncology), midurethral sling and cystoscopy (Urology) for BRCA 2+, LIZETH. Doing well post-op, brady replaced per Urology for urinary retention with plans for removal on Friday.    P:   -Okay to discharge home  -Urology to arrange f/u in clinic for Friday  -Oxy script provided. Patient has tylenol, ibuprofen, stool softeners at home  -Reviewed return precautions, expectations, restrictions    Pamela Gil MD  Ob/Gyn PGY-3  9/15/2020 12:26 PM  "

## 2020-09-15 NOTE — ANESTHESIA CARE TRANSFER NOTE
Patient: Debbie Baker    Procedure(s):  laparoscopic bilateral salpingo-oophorectomy, pelvic washings  Midurethral sling and cystoscopy    Diagnosis: BRCA2 gene mutation positive in female [Z15.01, Z15.02, Z15.09]  Diagnosis Additional Information: No value filed.    Anesthesia Type:   General     Note:  Airway :Nasal Cannula  Patient transferred to:PACU  Handoff Report: Identifed the Patient, Identified the Reponsible Provider, Reviewed the pertinent medical history, Discussed the surgical course, Reviewed Intra-OP anesthesia mangement and issues during anesthesia, Set expectations for post-procedure period and Allowed opportunity for questions and acknowledgement of understanding      Vitals: (Last set prior to Anesthesia Care Transfer)    CRNA VITALS  9/15/2020 0912 - 9/15/2020 0951      9/15/2020             Pulse:  70    SpO2:  100 %    Resp Rate (observed):  (!) 1                Electronically Signed By: HENRIQUE Prado CRNA  September 15, 2020  9:51 AM

## 2020-09-15 NOTE — OR NURSING
Pre-op TAP block performed without complications. VSS. Pt tolerated well. Will continue to monitor.  Pt received 1mg Versed and 50mcg Fentanyl

## 2020-09-16 LAB — COPATH REPORT: NORMAL

## 2020-09-16 NOTE — PROGRESS NOTES
[facility]: Post-Discharge Note  SITUATION                                                      Admission:    Admission Date: 09/15/20   Reason for Admission: laparoscopic bilateral salpingo-oophorectomy, pelvic washings  Discharge:   Discharge Date: 09/15/20  Discharge Diagnosis: laparoscopic bilateral salpingo-oophorectomy, pelvic washings  Discharge Service: day surgery  Discharge Plan:  urology    BACKGROUND                                                      Ms. Debbie Baker is a 45 year old with a family history significant for breast and ovarian cancer and personal diagnosis of  BRCA 2 gene mutation diagnosed in 2/2016. As she has completed childbearing she was recommended to proceed with risk reducing bilateral salpingo-oophorectomy. She also has a history of stress urinary incontinence and elected to proceed with a midurethral sling with Urology at the time of risk reducing BSO. The risks, benefits, and alternatives to the procedure were discussed and she agreed to proceed.     ASSESSMENT      Discharge Assessment  Patient reports symptoms are: Improved(some abdominal bloating)  Does the patient have all of their medications?: Yes  Does patient know what their new medications are for?: Yes  Does patient have a follow-up appointment scheduled?: Yes  Does patient have any other questions or concerns?: No    Post-op  Did the patient have surgery or a procedure: Yes  Incision: healing  Drainage: No  Bleeding: none  Fever: No  Chills: No  Redness: No  Warmth: No  Swelling: No  Incision site pain: No  Closure: other  Eating & Drinking: eating and drinking without complaints/concerns  PO Intake: regular diet  Bowel Function: normal  Urinary Status: voiding without complaint/concerns        PLAN                                                      Outpatient Plan:      Future Appointments   Date Time Provider Department Center   [facility]2020 10:30 [facility]    10/1/2020  1:35 PM Celeste Murillo MD [facility]   [facility]14/2020 11:00 AM [facility]           Pamela Paulson

## 2020-09-18 LAB — COPATH REPORT: NORMAL

## 2020-09-25 ENCOUNTER — PRE VISIT (OUTPATIENT)
Dept: UROLOGY | Facility: CLINIC | Age: 45
End: 2020-09-25

## 2020-09-25 NOTE — TELEPHONE ENCOUNTER
Reason for Visit: post operative check up S/P mid urethral sling and cystoscopy    Diagnosis: LIZETH and urethral hypermobility    Orders/Procedures/Records: in system    Contact Patient: n/a    Rooming Requirements: nadeem Maher LPN  09/25/20  9:23 AM

## 2020-09-30 ENCOUNTER — VIRTUAL VISIT (OUTPATIENT)
Dept: UROLOGY | Facility: CLINIC | Age: 45
End: 2020-09-30
Payer: COMMERCIAL

## 2020-09-30 DIAGNOSIS — N39.3 STRESS INCONTINENCE: Primary | ICD-10-CM

## 2020-09-30 ASSESSMENT — PATIENT HEALTH QUESTIONNAIRE - PHQ9: SUM OF ALL RESPONSES TO PHQ QUESTIONS 1-9: 3

## 2020-09-30 NOTE — PROGRESS NOTES
Reason for Visit: 2 week post op for A midurethral sling on 9/15/20.    Clinical Data: Ms. Debbie Baker  is a 45 year old year old   female with a history of the above.   She returns today for her post operative visit.  She states that she is doing well.  No stress incontinence, minimal urinary urgency no urge incontinence.  She is not straining to urinate.    A/P s/p midurethral sling doing well  -f/u in 4 weeks.  -continue restrictions of lifting and sexual activity      Thank you for allowing me to participate in the care of  Ms.Dawn LENNY Baker   and I will keep you updated on her progress.    Pamela Chilel MD

## 2020-09-30 NOTE — NURSING NOTE
Chief Complaint   Patient presents with     Surgical Followup     post operative check up S/P mid urethral sling and cystoscopy       Patient Active Problem List   Diagnosis     Family history of malignant neoplasm of breast     BRCA2 positive     Allergic rhinitis     Seasonal affective disorder (H)     Family history of ovarian cancer     Family history of pancreatic cancer     At high risk for breast cancer     At risk for cancer     Depressive disorder     CTS (carpal tunnel syndrome)     BRCA2 gene mutation positive in female       Allergies   Allergen Reactions     Dust Mites Other (See Comments)       Current Outpatient Medications   Medication Sig Dispense Refill     acetaminophen (TYLENOL) 325 MG tablet Take 1-2 tablets (325-650 mg) by mouth every 6 hours as needed for mild pain       buPROPion (WELLBUTRIN) 100 MG tablet Take 100 mg by mouth 2 times daily        Cholecalciferol (VITAMIN D) 2000 UNITS tablet Take 2,000 Units by mouth every morning        fluticasone (FLONASE) 50 MCG/ACT nasal spray Spray 1 spray into both nostrils every morning        ibuprofen (ADVIL/MOTRIN) 600 MG tablet Take 1 tablet (600 mg) by mouth every 6 hours as needed for moderate pain       Multiple Vitamin (M.V.I. ADULT IV) Take 1 tablet by mouth every morning        tretinoin (RETIN-A) 0.05 % cream Apply topically as needed          Social History     Tobacco Use     Smoking status: Never Smoker     Smokeless tobacco: Never Used   Substance Use Topics     Alcohol use: Yes     Alcohol/week: 0.0 standard drinks     Comment: 1 drink/week     Drug use: No       Fabiola Maher LPN  9/30/2020  10:18 AM

## 2020-09-30 NOTE — PROGRESS NOTES
"Video Visit Technology for this patient: Dominic Video Visit- Patient was left in waiting room    Debbie Baker is a 45 year old female who is being evaluated via a billable video visit.      The patient has been notified of following:     \"This video visit will be conducted via a call between you and your physician/provider. We have found that certain health care needs can be provided without the need for an in-person physical exam.  This service lets us provide the care you need with a video conversation.  If a prescription is necessary we can send it directly to your pharmacy.  If lab work is needed we can place an order for that and you can then stop by our lab to have the test done at a later time.    Video visits are billed at different rates depending on your insurance coverage.  Please reach out to your insurance provider with any questions.    If during the course of the call the physician/provider feels a video visit is not appropriate, you will not be charged for this service.\"    Patient has given verbal consent for Video visit? Yes  How would you like to obtain your AVS? MyChart  If you are dropped from the video visit, the video invite should be resent to: Send to e-mail at: [email or web address]  Will anyone else be joining your video visit? No        Video-Visit Details    Type of service:  Video Visit    Video Start Time: 10:59 AM  Video End Time: 11:05 AM    Originating Location (pt. Location): Home    Distant Location (provider location):  [facility] UROLOGY AND [facility] FOR PROSTATE AND UROLOGIC CANCERS     Platform used for Video Visit: Dominic Chilel MD        " HPI:    80 year old woman with history of HLD, HTN, DM who presents for chronic cough of 6 months duration.  Cough is non productive.  She came in to the ED today because she was coughing non stop last night.  The patient denies any dyspnea, fevers, chills hemoptysis, abdominal pain, leg swelling.  The patient has lost some weight in the past few months. Post nasal drip symptoms that did not resolve with flonase.  The patient was worked up for this cough by an ENT but workup was normal.  The patient is a never smoker and has no family history of lung cancer.  The patient herself has no history of malignancy.      REVIEW OF SYSTEMS:  Constitutional: +Weight loss  ENMT: +post nasal drip symptoms  Neck: No pain, stiffness or neck swelling  Respiratory:+ non productive cough  Cardiovascular: No chest pain, palpitations, dizziness or leg swelling  Gastrointestinal: No abdominal or epigastric pain. No nausea, vomiting or hematemesis; No diarrhea or constipation. No melena or hematochezia.  Genitourinary: No dysuria, frequency, hematuria or incontinence  Skin: No itching, burning, rashes or lesions   Lymph Nodes: No enlarged glands  Musculoskeletal: No joint pain or swelling; No muscle, back or extremity pain  Psychiatric: No depression, anxiety, mood swings or difficulty sleeping  Heme/Lymph: No easy bruising or bleeding gums  Allergy and Immunologic: No hives or eczema    PAST MEDICAL & SURGICAL HISTORY:  GERD (gastroesophageal reflux disease)  High cholesterol  Diabetes  HTN (hypertension)      FAMILY HISTORY: Not significant      SOCIAL HISTORY:  Smoking Status: Never smoker      MEDICATIONS:  · 	metFORMIN 500 mg oral tablet: Last Dose Taken:  , 1 tab(s) orally 2 times a day  · 	amLODIPine 5 mg oral tablet: Last Dose Taken:  , 1 tab(s) orally once a day  · 	simvastatin 5 mg oral tablet: Last Dose Taken:  , 1 tab(s) orally once a day (at bedtime)  · 	pantoprazole 40 mg oral delayed release tablet: Last Dose Taken:  , 1 tab(s) orally once a day  · 	hydroCHLOROthiazide 12.5 mg oral tablet: Last Dose Taken:  , 1 tab(s) orally once a day      Allergies    penicillin (Rash)          Vital Signs Last 24 Hrs  T(C): 36.5, Max: 36.5 (06-23 @ 13:56)  T(F): 97.7, Max: 97.7 (06-23 @ 13:56)  HR: 48 (48 - 77)  BP: 107/55 (107/55 - 136/74)  BP(mean): --  RR: 18 (18 - 18)  SpO2: 99% (98% - 99%)        PHYSICAL EXAM:    General: Well developed; well nourished; in no acute distress  Head: Normocephalic; atraumatic  ENMT: No nasal discharge; airway clear  Neck: Supple; non tender; no masses  Respiratory: Clear to auscultation bilaterally without wheezing, rhonchi or rales  Cardiovascular: Regular rate and rhythm. S1 and S2 Normal; No murmurs, gallops or rubs  Extremities: 2+ clubbing of finger nails  Neurological: Alert and oriented x3  Skin: Warm and dry. No obvious rash  Lymph Nodes: No acute cervical or supraclavicular adenopathy  Musculoskeletal: Normal gait, tone, without deformities  Psychiatric: Cooperative and appropriate mood    LABS:      CBC Full  -  ( 23 Jun 2017 14:51 )  WBC Count : 3.9 K/uL  Hemoglobin : 9.7 g/dL  Hematocrit : 30.5 %  Platelet Count - Automated : 150 K/uL  Mean Cell Volume : 82.4 fL  Mean Cell Hemoglobin : 26.2 pg  Mean Cell Hemoglobin Concentration : 31.8 g/dL  Auto Neutrophil # : x  Auto Lymphocyte # : x  Auto Monocyte # : x  Auto Eosinophil # : x  Auto Basophil # : x  Auto Neutrophil % : 59.1 %  Auto Lymphocyte % : 26.7 %  Auto Monocyte % : 13.6 %  Auto Eosinophil % : 0.3 %  Auto Basophil % : 0.2 %    06-23    140  |  100  |  24<H>  ----------------------------<  103<H>  3.6   |  26  |  1.20    Ca    9.6      23 Jun 2017 14:51    TPro  8.0  /  Alb  3.9  /  TBili  0.2  /  DBili  x   /  AST  14  /  ALT  8<L>  /  AlkPhos  76  06-23                      RADIOLOGY & ADDITIONAL STUDIES (The following images were personally reviewed):  CXR: Clear

## 2020-09-30 NOTE — LETTER
"9/30/2020       RE: Debbie Baker  [address]  [place] [facility] [address]     Dear Colleague,    Thank you for referring your patient, Debbie Baker, to the [facility] UROLOGY AND [facility] FOR PROSTATE AND UROLOGIC CANCERS at [facility]. Please see a copy of my visit note below.    Video Visit Technology for this patient: Dominic Video Visit- Patient was left in waiting room    Debbie Baker is a 45 year old female who is being evaluated via a billable video visit.      The patient has been notified of following:     \"This video visit will be conducted via a call between you and your physician/provider. We have found that certain health care needs can be provided without the need for an in-person physical exam.  This service lets us provide the care you need with a video conversation.  If a prescription is necessary we can send it directly to your pharmacy.  If lab work is needed we can place an order for that and you can then stop by our lab to have the test done at a later time.    Video visits are billed at different rates depending on your insurance coverage.  Please reach out to your insurance provider with any questions.    If during the course of the call the physician/provider feels a video visit is not appropriate, you will not be charged for this service.\"    Patient has given verbal consent for Video visit? Yes  How would you like to obtain your AVS? MyChart  If you are dropped from the video visit, the video invite should be resent to: Send to e-mail at: [email or web address]  Will anyone else be joining your video visit? No        Video-Visit Details    Type of service:  Video Visit    Video Start Time: 10:59 AM  Video End Time: 11:05 AM    Originating Location (pt. Location): Home    Distant Location (provider location):  [facility] UROLOGY AND [facility] FOR PROSTATE AND UROLOGIC CANCERS     Platform used for Video Visit: Dominic Chilel MD          Reason for Visit: 2 " week post op for A midurethral sling on 9/15/20.    Clinical Data: Ms. Debbie Baker  is a 45 year old year old   female with a history of the above.   She returns today for her post operative visit.  She states that she is doing well.  No stress incontinence, minimal urinary urgency no urge incontinence.  She is not straining to urinate.    A/P s/p midurethral sling doing well  -f/u in 4 weeks.  -continue restrictions of lifting and sexual activity      Thank you for allowing me to participate in the care of  Ms.Dawn LENNY Baker   and I will keep you updated on her progress.    Pamela Chilel MD

## 2020-10-01 ENCOUNTER — OFFICE VISIT (OUTPATIENT)
Dept: ONCOLOGY | Facility: CLINIC | Age: 45
End: 2020-10-01
Attending: OBSTETRICS & GYNECOLOGY
Payer: COMMERCIAL

## 2020-10-01 VITALS
WEIGHT: 141.5 LBS | BODY MASS INDEX: 26.04 KG/M2 | HEIGHT: 62 IN | OXYGEN SATURATION: 98 % | DIASTOLIC BLOOD PRESSURE: 90 MMHG | HEART RATE: 70 BPM | RESPIRATION RATE: 16 BRPM | SYSTOLIC BLOOD PRESSURE: 128 MMHG

## 2020-10-01 DIAGNOSIS — Z15.09 BRCA2 GENE MUTATION POSITIVE IN FEMALE: Primary | ICD-10-CM

## 2020-10-01 DIAGNOSIS — Z15.01 BRCA2 GENE MUTATION POSITIVE IN FEMALE: Primary | ICD-10-CM

## 2020-10-01 DIAGNOSIS — Z98.890 POSTOPERATIVE STATE: ICD-10-CM

## 2020-10-01 DIAGNOSIS — Z15.02 BRCA2 GENE MUTATION POSITIVE IN FEMALE: Primary | ICD-10-CM

## 2020-10-01 PROCEDURE — G0463 HOSPITAL OUTPT CLINIC VISIT: HCPCS

## 2020-10-01 PROCEDURE — 99024 POSTOP FOLLOW-UP VISIT: CPT | Performed by: OBSTETRICS & GYNECOLOGY

## 2020-10-01 ASSESSMENT — ENCOUNTER SYMPTOMS
PARALYSIS: 0
TINGLING: 0
NAUSEA: 0
TREMORS: 0
ABDOMINAL PAIN: 0
NUMBNESS: 0
DYSURIA: 0
INSOMNIA: 1
SEIZURES: 0
HEMATURIA: 0
HEADACHES: 0
MEMORY LOSS: 1
WEAKNESS: 0
PANIC: 0
NERVOUS/ANXIOUS: 0
CONSTIPATION: 1
BLOOD IN STOOL: 0
RECTAL PAIN: 0
DEPRESSION: 0
LOSS OF CONSCIOUSNESS: 0
HEARTBURN: 0
FLANK PAIN: 0
BOWEL INCONTINENCE: 0
DIFFICULTY URINATING: 0
DECREASED CONCENTRATION: 1
DIZZINESS: 0
DIARRHEA: 0
JAUNDICE: 0
VOMITING: 0
BLOATING: 1
DISTURBANCES IN COORDINATION: 0
SPEECH CHANGE: 0

## 2020-10-01 ASSESSMENT — PAIN SCALES - GENERAL: PAINLEVEL: NO PAIN (0)

## 2020-10-01 ASSESSMENT — MIFFLIN-ST. JEOR: SCORE: 1235.09

## 2020-10-01 NOTE — NURSING NOTE
"Oncology Rooming Note    October 1, 2020 1:34 PM   Debbie Baker is a 45 year old female who presents for:    Chief Complaint   Patient presents with     Oncology Clinic Visit     OVARIAN CANCER      Initial Vitals: BP (!) 128/90   Pulse 70   Resp 16   Ht 1.575 m (5' 2\")   Wt 64.2 kg (141 lb 8 oz)   SpO2 98%   BMI 25.88 kg/m   Estimated body mass index is 25.88 kg/m  as calculated from the following:    Height as of this encounter: 1.575 m (5' 2\").    Weight as of this encounter: 64.2 kg (141 lb 8 oz). Body surface area is 1.68 meters squared.  No Pain (0) Comment: Data Unavailable   No LMP recorded.  Allergies reviewed: Yes  Medications reviewed: Yes    Medications: Medication refills not needed today.  Pharmacy name entered into Automated Trading Desk: [id number] - [place], [place] - [address] AT [facility]    Clinical concerns: Patient has a list of questions for you.  Dr Murillo  was NOT notified.      Shonda Rodriguez            "

## 2020-10-01 NOTE — LETTER
10/1/2020         RE: Debbie Baker  [address]  [place] [address]        Dear Colleague,    Thank you for referring your patient, Debbie Baker, to the [facility] CANCER CLINIC. Please see a copy of my visit note below.                                           Follow up Notes on Referred Patient    Date: 2020           Dr. Meliza Najera, APRN CNS  [facility]  [address] [place] [address]       RE: Debbie Baker  : 1975  ANDI: 2020        Dear Dr. Meliza Najera:    I had the pleasure of seeing your patient Debbie Baker here at the Gynecologic Cancer Clinic at the [facility] on 2020.  As you know she is a very pleasant 44 year old woman with a recent diagnosis of  BRCA 2+-found out in 2016 s/p prophylactic surgery.     Today: first 4 days had some bleeding, eating and drinking well, wounds healing well. Sling working well, better when she coughs or sneezes. Occ has some urges when needs to go to the BR has to go right away. No hot flashes. Occ mood swings. Word recall issue all of a sudden.       Has been seen every 6 mos. Had a breast cyst that had a clip placed. Had IUD removed last week at annual exam-was at end of expiration date.  After removal bled x 2-3 days unsure if this was a normal period. When had her IUD, was about q 28 to 20 days-very minimal bleeding. GM had a stroke last week and they expect her to pass away. Does not want to go through surgery as to much for her mental health at this point. Wants to delay until August or Sept after possible trip as well.     HPI:  Ms. Baker is a very pleasant, healthy 41 year old female who presents with family history of breast and ovarian cancer and a personal diagnosis of a BRCA2 mutation, specifically [id number], found on single site analysis genetic testing through [employer] on 16.     Pertinent history:  Menarche at age  11  First child at age 30  6-8 month history of breast feeding  Currently using the Mirena IUD (inserted in )  Reports a 15-20 year history of oral contraceptive use  Her only past history with breast problems was an episode of mastitis when she was breastfeeding her children.   x 2    Breast screening history:  2014, Screening, mammogram,  BiRads1.    3/11/2016 - Screening mammogram, BiRads1  2016 - Breast MRI, Unremarkable bilateral breast MRI. BI-RADS CATEGORY: 1 -    NEGATIVE.     No history of breast biopsy    2016 Pelvic US  IMPRESSION:    1.  Negative pelvic ultrasound.  2.  IUD in appropriate position.     2016: -8    10/24/2019 pelvic US  IMPRESSION:   Two adjacent anechoic cysts in the right ovary appear smaller on  today's exam compared to 2019. The left ovary is unremarkable.  Consider follow-up ultrasound in 6 months.    9/15/2020    Procedure:       Laparoscopic bilateral salpingo-oophorectomy, pelvic washings (GYN Oncology)                            Midurethral sling and cystoscopy (Urology)    FINAL DIAGNOSIS:   BILATERAL FALLOPIAN TUBES AND OVARIES, BILATERAL SALPINGO-OOPHORECTOMY:   - Ovaries with corpus lutea   - Benign cortical inclusion cysts   - Fallopian tubes with no significant histologic abnormality   - Negative for malignancy or premalignant lesions     Pelvic Washing:   - Negative for malignancy   - Specimen Adequacy: Satisfactory for evaluation.     ROS:  Answers for HPI/ROS submitted by the patient on 10/1/2020   General Symptoms: No  Skin Symptoms: No  HENT Symptoms: No  EYE SYMPTOMS: No  HEART SYMPTOMS: No  LUNG SYMPTOMS: No  INTESTINAL SYMPTOMS: Yes  URINARY SYMPTOMS: Yes  GYNECOLOGIC SYMPTOMS: No  BREAST SYMPTOMS: No  SKELETAL SYMPTOMS: No  BLOOD SYMPTOMS: No  NERVOUS SYSTEM SYMPTOMS: Yes  MENTAL HEALTH SYMPTOMS: Yes  Heart burn or indigestion: No  Nausea: No  Vomiting: No  Abdominal pain: No  Bloating: Yes  Constipation: Yes  Diarrhea: No  Blood in  stool: No  Black stools: No  Rectal or Anal pain: No  Fecal incontinence: No  Yellowing of skin or eyes: No  Vomit with blood: No  Change in stools: No  Trouble holding urine or incontinence: Yes  Pain or burning: No  Trouble starting or stopping: No  Increased frequency of urination: No  Blood in urine: No  Decreased frequency of urination: No  Frequent nighttime urination: No  Flank pain: No  Difficulty emptying bladder: No  Trouble with coordination: No  Dizziness or trouble with balance: No  Fainting or black-out spells: No  Memory loss: Yes  Headache: No  Seizures: No  Speech problems: No  Tingling: No  Tremor: No  Weakness: No  Difficulty walking: No  Paralysis: No  Numbness: No  Nervous or Anxious: No  Depression: No  Trouble sleeping: Yes  Trouble thinking or concentrating: Yes  Mood changes: Yes  Panic attacks: No         Date Value Ref Range Status   12/06/2019 6 0 - 30 U/mL Final     Comment:     Assay Method:  Chemiluminescence using Siemens Centaur XP   10/24/2019 39 (H) 0 - 30 U/mL Final     Comment:     Assay Method:  Chemiluminescence using Siemens Centaur XP   04/12/2019 9 0 - 30 U/mL Final     Comment:     Assay Method:  Chemiluminescence using Siemens Centaur XP   09/28/2018 8 0 - 30 U/mL Final     Comment:     Assay Method:  Chemiluminescence using Siemens Centaur XP   03/30/2018 6 0 - 30 U/mL Final     Comment:     Assay Method:  Chemiluminescence using Siemens Centaur XP   09/15/2017 8 0 - 30 U/mL Final     Comment:     Assay Method:  Chemiluminescence using Siemens Centaur XP   03/10/2017 9 0 - 30 U/mL Final     Comment:     Assay Method:  Chemiluminescence using Siemens Centaur XP   09/09/2016 8 0 - 30 U/mL Final     Comment:     Assay Method:  Chemiluminescence using Siemens Centaur XP   03/11/2016 10 0 - 30 U/mL Final         Review of Systems:    I have reviewed the pertinent positive findings in the review of symptoms with the patient.      Past Medical History:    Past Medical  History:   Diagnosis Date     Allergic rhinitis      At high risk for breast cancer 01/12/2016    BRCA2+     At risk for cancer 01/12/2016    at risk for ovarian/fallopian tube cancer, BRCA2+     BRCA2 positive 01/12/2016    [id number]; single site analysis through [facility] [employer]     Depression      Mastitis in female 2010         Past Surgical History:    Past Surgical History:   Procedure Laterality Date     CARPAL TUNNEL RELEASE RT/LT Bilateral     2018     CYSTOSCOPY, SLING TRANSVAGINAL N/A 9/15/2020    Procedure: Midurethral sling and cystoscopy;  Surgeon: Pamela Chilel MD;  Location: [facility] OR     LAPAROSCOPIC SALPINGO-OOPHORECTOMY Bilateral 9/15/2020    Procedure: laparoscopic bilateral salpingo-oophorectomy, pelvic washings;  Surgeon: Celeste Murillo MD;  Location: [facility] OR     wisdom teeth       [place] teeth removed    Health Maintenance:  Health Maintenance Due   Topic Date Due     PREVENTIVE CARE VISIT  1975     DEPRESSION ACTION PLAN  1975     HIV SCREENING  09/01/1989     LIPID  09/01/2019     INFLUENZA VACCINE (1) 09/01/2020       Last Pap Smear: 6/2020              Result: pending  She has not had a history of abnormal Pap smears.    Last Mammogram: 2020             Result: required biospy    She has not had a history of abnormal mammograms.    Last Colonoscopy: x1              Result: normal                        Current Medications:     has a current medication list which includes the following prescription(s): acetaminophen, bupropion, vitamin d3, fluticasone, ibuprofen, multiple vitamin, and tretinoin.       Allergies:     Allergies   Allergen Reactions     Dust Mites Other (See Comments)            Social History:     Social History     Tobacco Use     Smoking status: Never Smoker     Smokeless tobacco: Never Used   Substance Use Topics     Alcohol use: Yes     Alcohol/week: 0.0 standard drinks     Comment: 1 drink/week       History   Drug Use No           Family History:      The patient's family history is notable for the following.    Family History   Problem Relation Age of Onset     Hypertension Mother      Hypertension Father      Hearing Loss Father      Valvular heart disease Father         s/p mitral valve replacement     Cerebrovascular Disease Father         TIAs     Ovarian Cancer Maternal Grandmother 59         at 61     Cancer Maternal Grandmother         Ovarian Cancer     Pancreatic Cancer Maternal Grandfather 87         at 87     Cancer Maternal Grandfather         Pancreatic Cancer     Skin Cancer Paternal Grandmother 88     Bladder Cancer Paternal Grandfather 60         at 80     Cancer Paternal Grandfather         Bladder     Breast Cancer Maternal Aunt 51        BRCA2+     No Known Problems Sister      No Known Problems Brother      Mother had ovaries out.  Maternal GM sister with pancreatic cancer  Maternal aunt breast cancer 50's or late 40's  Mat gm sister had primary peritoneal cancer late 60's 70's    Physical Exam: B/P: 128/90, T: Data Unavailable, P: 70, R: 16    No physical exam or VS done due to virtual visit because of COVID 19.       Psychiatric: appropriate mood and affect   Wound:  CDI on abdomen. No pain or guarding.                         Assessment:    Debbie Baker is a 44 year old woman with a new diagnosis of BRCA2+.  S/p prophylactic surgery.      Plan:     1.)    BRCA2+- yearly pelvic exams, keep up with pap smear screening, MRI q 6 mos interchanging with mammogram. Has not had discussion re prophylactic mastectomy because not there yet. Continue to follow with recommendations from Meliza Najera.  -Ca++, Vit D, weight bearing exercise  -sleep issues-discussed melatonin.   -Vaginal dryness-discussed lubricants     2.) Genetic risk factors were assessed - BRCA 2+    3.) Labs and/or tests ordered include:  none.            Thank you for allowing us to participate in the care of your patient.         Sincerely,    Celeste Murillo,  MD    Department of Ob/Gyn and Women's Health  Division of Gynecologic Oncology  [facility]  [phone]    Of a 20 minute appointment, the entire time was spent counseling the patient via video visit due to COVID 19 to decrease the patient and provider risk to exposure.      CC  Patient Care Team:  Aspen Renteria as PCP - General (Nurse Practitioner)  Marianela Davis MD (OB/Gyn)  Bel Jo MD as MD (Urology)  Pamela Chilel MD as MD (Urology)  Linh Arriaza, RN as Specialty Care Coordinator (Urology)  SWATI GAMINO

## 2020-10-01 NOTE — PROGRESS NOTES
Follow up Notes on Referred Patient    Date: 2020           Dr. Meliza Najera, APRN CNS  [facility]  [address] [place] [address]       RE: Debbie Baker  : 1975  ANDI: 2020        Dear Dr. Meliza Najera:    I had the pleasure of seeing your patient Debbie Baker here at the Gynecologic Cancer Clinic at the [facility] on 2020.  As you know she is a very pleasant 44 year old woman with a recent diagnosis of  BRCA 2+-found out in 2016 s/p prophylactic surgery.     Today: first 4 days had some bleeding, eating and drinking well, wounds healing well. Sling working well, better when she coughs or sneezes. Occ has some urges when needs to go to the BR has to go right away. No hot flashes. Occ mood swings. Word recall issue all of a sudden.       Has been seen every 6 mos. Had a breast cyst that had a clip placed. Had IUD removed last week at annual exam-was at end of expiration date.  After removal bled x 2-3 days unsure if this was a normal period. When had her IUD, was about q 28 to 20 days-very minimal bleeding. GM had a stroke last week and they expect her to pass away. Does not want to go through surgery as to much for her mental health at this point. Wants to delay until August or Sept after possible trip as well.     HPI:  Ms. Baker is a very pleasant, healthy 41 year old female who presents with family history of breast and ovarian cancer and a personal diagnosis of a BRCA2 mutation, specifically [id number], found on single site analysis genetic testing through [facility] on 16.     Pertinent history:  Menarche at age 11  First child at age 30  6-8 month history of breast feeding  Currently using the Mirena IUD (inserted in )  Reports a 15-20 year history of oral contraceptive use  Her only past history with breast problems was an episode of mastitis when she  was breastfeeding her children.   x 2    Breast screening history:  2014, Screening, mammogram,  BiRads1.    3/11/2016 - Screening mammogram, BiRads1  2016 - Breast MRI, Unremarkable bilateral breast MRI. BI-RADS CATEGORY: 1 -    NEGATIVE.     No history of breast biopsy    2016 Pelvic US  IMPRESSION:    1.  Negative pelvic ultrasound.  2.  IUD in appropriate position.     2016: -8    10/24/2019 pelvic US  IMPRESSION:   Two adjacent anechoic cysts in the right ovary appear smaller on  today's exam compared to 2019. The left ovary is unremarkable.  Consider follow-up ultrasound in 6 months.    9/15/2020    Procedure:       Laparoscopic bilateral salpingo-oophorectomy, pelvic washings (GYN Oncology)                            Midurethral sling and cystoscopy (Urology)    FINAL DIAGNOSIS:   BILATERAL FALLOPIAN TUBES AND OVARIES, BILATERAL SALPINGO-OOPHORECTOMY:   - Ovaries with corpus lutea   - Benign cortical inclusion cysts   - Fallopian tubes with no significant histologic abnormality   - Negative for malignancy or premalignant lesions     Pelvic Washing:   - Negative for malignancy   - Specimen Adequacy: Satisfactory for evaluation.     ROS:  Answers for HPI/ROS submitted by the patient on 10/1/2020   General Symptoms: No  Skin Symptoms: No  HENT Symptoms: No  EYE SYMPTOMS: No  HEART SYMPTOMS: No  LUNG SYMPTOMS: No  INTESTINAL SYMPTOMS: Yes  URINARY SYMPTOMS: Yes  GYNECOLOGIC SYMPTOMS: No  BREAST SYMPTOMS: No  SKELETAL SYMPTOMS: No  BLOOD SYMPTOMS: No  NERVOUS SYSTEM SYMPTOMS: Yes  MENTAL HEALTH SYMPTOMS: Yes  Heart burn or indigestion: No  Nausea: No  Vomiting: No  Abdominal pain: No  Bloating: Yes  Constipation: Yes  Diarrhea: No  Blood in stool: No  Black stools: No  Rectal or Anal pain: No  Fecal incontinence: No  Yellowing of skin or eyes: No  Vomit with blood: No  Change in stools: No  Trouble holding urine or incontinence: Yes  Pain or burning: No  Trouble starting or stopping:  No  Increased frequency of urination: No  Blood in urine: No  Decreased frequency of urination: No  Frequent nighttime urination: No  Flank pain: No  Difficulty emptying bladder: No  Trouble with coordination: No  Dizziness or trouble with balance: No  Fainting or black-out spells: No  Memory loss: Yes  Headache: No  Seizures: No  Speech problems: No  Tingling: No  Tremor: No  Weakness: No  Difficulty walking: No  Paralysis: No  Numbness: No  Nervous or Anxious: No  Depression: No  Trouble sleeping: Yes  Trouble thinking or concentrating: Yes  Mood changes: Yes  Panic attacks: No         Date Value Ref Range Status   12/06/2019 6 0 - 30 U/mL Final     Comment:     Assay Method:  Chemiluminescence using Siemens Centaur XP   10/24/2019 39 (H) 0 - 30 U/mL Final     Comment:     Assay Method:  Chemiluminescence using Siemens Centaur XP   04/12/2019 9 0 - 30 U/mL Final     Comment:     Assay Method:  Chemiluminescence using Siemens Centaur XP   09/28/2018 8 0 - 30 U/mL Final     Comment:     Assay Method:  Chemiluminescence using Siemens Centaur XP   03/30/2018 6 0 - 30 U/mL Final     Comment:     Assay Method:  Chemiluminescence using Siemens Centaur XP   09/15/2017 8 0 - 30 U/mL Final     Comment:     Assay Method:  Chemiluminescence using Siemens Centaur XP   03/10/2017 9 0 - 30 U/mL Final     Comment:     Assay Method:  Chemiluminescence using Siemens Centaur XP   09/09/2016 8 0 - 30 U/mL Final     Comment:     Assay Method:  Chemiluminescence using Siemens Centaur XP   03/11/2016 10 0 - 30 U/mL Final         Review of Systems:    I have reviewed the pertinent positive findings in the review of symptoms with the patient.      Past Medical History:    Past Medical History:   Diagnosis Date     Allergic rhinitis      At high risk for breast cancer 01/12/2016    BRCA2+     At risk for cancer 01/12/2016    at risk for ovarian/fallopian tube cancer, BRCA2+     BRCA2 positive 01/12/2016    [id number]; single  site analysis through [facility]     Depression      Mastitis in female 2010         Past Surgical History:    Past Surgical History:   Procedure Laterality Date     CARPAL TUNNEL RELEASE RT/LT Bilateral     2018     CYSTOSCOPY, SLING TRANSVAGINAL N/A 9/15/2020    Procedure: Midurethral sling and cystoscopy;  Surgeon: Pamela Chilel MD;  Location: [facility] OR     LAPAROSCOPIC SALPINGO-OOPHORECTOMY Bilateral 9/15/2020    Procedure: laparoscopic bilateral salpingo-oophorectomy, pelvic washings;  Surgeon: Celeste Murillo MD;  Location: [facility] OR     wisdom teeth       [place] Hat teeth removed    Health Maintenance:  Health Maintenance Due   Topic Date Due     PREVENTIVE CARE VISIT  1975     DEPRESSION ACTION PLAN  1975     HIV SCREENING  09/01/1989     LIPID  09/01/2019     INFLUENZA VACCINE (1) 09/01/2020       Last Pap Smear: 6/2020              Result: pending  She has not had a history of abnormal Pap smears.    Last Mammogram: 2020             Result: required biospy    She has not had a history of abnormal mammograms.    Last Colonoscopy: x1              Result: normal                        Current Medications:     has a current medication list which includes the following prescription(s): acetaminophen, bupropion, vitamin d3, fluticasone, ibuprofen, multiple vitamin, and tretinoin.       Allergies:     Allergies   Allergen Reactions     Dust Mites Other (See Comments)            Social History:     Social History     Tobacco Use     Smoking status: Never Smoker     Smokeless tobacco: Never Used   Substance Use Topics     Alcohol use: Yes     Alcohol/week: 0.0 standard drinks     Comment: 1 drink/week       History   Drug Use No           Family History:     The patient's family history is notable for the following.    Family History   Problem Relation Age of Onset     Hypertension Mother      Hypertension Father      Hearing Loss Father      Valvular heart disease Father         s/p mitral valve  replacement     Cerebrovascular Disease Father         TIAs     Ovarian Cancer Maternal Grandmother 59         at 61     Cancer Maternal Grandmother         Ovarian Cancer     Pancreatic Cancer Maternal Grandfather 87         at 87     Cancer Maternal Grandfather         Pancreatic Cancer     Skin Cancer Paternal Grandmother 88     Bladder Cancer Paternal Grandfather 60         at 80     Cancer Paternal Grandfather         Bladder     Breast Cancer Maternal Aunt 51        BRCA2+     No Known Problems Sister      No Known Problems Brother      Mother had ovaries out.  Maternal GM sister with pancreatic cancer  Maternal aunt breast cancer 50's or late 40's  Mat gm sister had primary peritoneal cancer late 60's 70's    Physical Exam: B/P: 128/90, T: Data Unavailable, P: 70, R: 16    No physical exam or VS done due to virtual visit because of COVID 19.       Psychiatric: appropriate mood and affect   Wound:  CDI on abdomen. No pain or guarding.                         Assessment:    Debbie Baker is a 44 year old woman with a new diagnosis of BRCA2+.  S/p prophylactic surgery.      Plan:     1.)    BRCA2+- yearly pelvic exams, keep up with pap smear screening, MRI q 6 mos interchanging with mammogram. Has not had discussion re prophylactic mastectomy because not there yet. Continue to follow with recommendations from Meliza Najera.  -Ca++, Vit D, weight bearing exercise  -sleep issues-discussed melatonin.   -Vaginal dryness-discussed lubricants     2.) Genetic risk factors were assessed - BRCA 2+    3.) Labs and/or tests ordered include:  none.            Thank you for allowing us to participate in the care of your patient.         Sincerely,    Celeste Murillo MD    Department of Ob/Gyn and Women's Health  Division of Gynecologic Oncology  [facility]  [phone]    Of a 20 minute appointment, the entire time was spent counseling the patient via video visit due  to COVID 19 to decrease the patient and provider risk to exposure.      CC  Patient Care Team:  Aspen Renteria as PCP - General (Nurse Practitioner)  Marianela Davis MD (OB/Gyn)  Bel Jo MD as MD (Urology)  Celeste Murillo MD as Referring Physician (Oncology)  Pamela Chilel MD as MD (Urology)  Linh Arriaza, RN as Specialty Care Coordinator (Urology)  Aspen Renteria as Referring Physician (Nurse Practitioner)  SWATI GAMINO

## 2020-10-05 NOTE — PATIENT INSTRUCTIONS
No further follow up needed in gyn oncology  Continue regular screening visits with PCP and gynecology

## 2020-10-06 ENCOUNTER — PRE VISIT (OUTPATIENT)
Dept: UROLOGY | Facility: CLINIC | Age: 45
End: 2020-10-06

## 2020-10-06 NOTE — TELEPHONE ENCOUNTER
Reason for Visit: post operative check up S/P mid urethral sling and cystoscopy     Diagnosis: LIZETH and urethral hypermobility     Orders/Procedures/Records: in system     Contact Patient: n/a     Rooming Requirements: UA dip / PVR / undress for exam      Fabiola Maher LPN  10/06/20  11:00 AM

## 2020-10-08 DIAGNOSIS — N39.0 URINARY TRACT INFECTION WITHOUT HEMATURIA, SITE UNSPECIFIED: ICD-10-CM

## 2020-10-08 DIAGNOSIS — N39.0 URINARY TRACT INFECTION WITHOUT HEMATURIA, SITE UNSPECIFIED: Primary | ICD-10-CM

## 2020-10-08 LAB
ALBUMIN UR-MCNC: NEGATIVE MG/DL
APPEARANCE UR: CLEAR
BACTERIA #/AREA URNS HPF: ABNORMAL /HPF
BILIRUB UR QL STRIP: NEGATIVE
COLOR UR AUTO: YELLOW
GLUCOSE UR STRIP-MCNC: NEGATIVE MG/DL
HGB UR QL STRIP: NEGATIVE
KETONES UR STRIP-MCNC: NEGATIVE MG/DL
LEUKOCYTE ESTERASE UR QL STRIP: ABNORMAL
NITRATE UR QL: NEGATIVE
NON-SQ EPI CELLS #/AREA URNS LPF: ABNORMAL /LPF
PH UR STRIP: 5.5 PH (ref 5–7)
RBC #/AREA URNS AUTO: ABNORMAL /HPF
SOURCE: ABNORMAL
SP GR UR STRIP: 1.01 (ref 1–1.03)
UROBILINOGEN UR STRIP-ACNC: 0.2 EU/DL (ref 0.2–1)
WBC #/AREA URNS AUTO: ABNORMAL /HPF

## 2020-10-08 PROCEDURE — 81001 URINALYSIS AUTO W/SCOPE: CPT | Performed by: UROLOGY

## 2020-10-08 PROCEDURE — 87086 URINE CULTURE/COLONY COUNT: CPT | Performed by: UROLOGY

## 2020-10-09 ENCOUNTER — TELEPHONE (OUTPATIENT)
Dept: UROLOGY | Facility: CLINIC | Age: 45
End: 2020-10-09

## 2020-10-09 DIAGNOSIS — N39.0 URINARY TRACT INFECTION WITHOUT HEMATURIA, SITE UNSPECIFIED: Primary | ICD-10-CM

## 2020-10-09 LAB
BACTERIA SPEC CULT: NORMAL
Lab: NORMAL
SPECIMEN SOURCE: NORMAL

## 2020-10-09 RX ORDER — SULFAMETHOXAZOLE/TRIMETHOPRIM 800-160 MG
1 TABLET ORAL 2 TIMES DAILY
Qty: 10 TABLET | Refills: 0 | Status: SHIPPED | OUTPATIENT
Start: 2020-10-09 | End: 2020-10-14

## 2020-10-09 NOTE — TELEPHONE ENCOUNTER
Patient called and told bactrim is at her pharmacy  Told her I would call on Monday with results Anabell Cancino LPN Staff Nurse

## 2020-10-12 ENCOUNTER — OFFICE VISIT (OUTPATIENT)
Dept: UROLOGY | Facility: CLINIC | Age: 45
End: 2020-10-12
Payer: COMMERCIAL

## 2020-10-12 ENCOUNTER — TELEPHONE (OUTPATIENT)
Dept: UROLOGY | Facility: CLINIC | Age: 45
End: 2020-10-12

## 2020-10-12 VITALS
HEART RATE: 70 BPM | HEIGHT: 62 IN | BODY MASS INDEX: 25.95 KG/M2 | DIASTOLIC BLOOD PRESSURE: 54 MMHG | SYSTOLIC BLOOD PRESSURE: 109 MMHG | WEIGHT: 141 LBS

## 2020-10-12 DIAGNOSIS — N39.3 STRESS INCONTINENCE: Primary | ICD-10-CM

## 2020-10-12 DIAGNOSIS — R39.15 URINARY URGENCY: ICD-10-CM

## 2020-10-12 PROCEDURE — 99024 POSTOP FOLLOW-UP VISIT: CPT | Performed by: UROLOGY

## 2020-10-12 ASSESSMENT — PAIN SCALES - GENERAL: PAINLEVEL: NO PAIN (0)

## 2020-10-12 ASSESSMENT — MIFFLIN-ST. JEOR: SCORE: 1232.82

## 2020-10-12 NOTE — NURSING NOTE
"Chief Complaint   Patient presents with     Surgical Followup     post operative check up S/P mid urethral sling and cystoscopy       Blood pressure 109/54, pulse 70, height 1.575 m (5' 2\"), weight 64 kg (141 lb), not currently breastfeeding. Body mass index is 25.79 kg/m .    Patient Active Problem List   Diagnosis     Family history of malignant neoplasm of breast     BRCA2 positive     Allergic rhinitis     Seasonal affective disorder (H)     Family history of ovarian cancer     Family history of pancreatic cancer     At high risk for breast cancer     At risk for cancer     Depressive disorder     CTS (carpal tunnel syndrome)     BRCA2 gene mutation positive in female       Allergies   Allergen Reactions     Dust Mites Other (See Comments)       Current Outpatient Medications   Medication Sig Dispense Refill     acetaminophen (TYLENOL) 325 MG tablet Take 1-2 tablets (325-650 mg) by mouth every 6 hours as needed for mild pain       buPROPion (WELLBUTRIN) 100 MG tablet Take 100 mg by mouth 2 times daily        Cholecalciferol (VITAMIN D) 2000 UNITS tablet Take 2,000 Units by mouth every morning        fluticasone (FLONASE) 50 MCG/ACT nasal spray Spray 1 spray into both nostrils every morning        ibuprofen (ADVIL/MOTRIN) 600 MG tablet Take 1 tablet (600 mg) by mouth every 6 hours as needed for moderate pain       Multiple Vitamin (M.V.I. ADULT IV) Take 1 tablet by mouth every morning        sulfamethoxazole-trimethoprim (BACTRIM DS) 800-160 MG tablet Take 1 tablet by mouth 2 times daily for 5 days 10 tablet 0     tretinoin (RETIN-A) 0.05 % cream Apply topically as needed          Social History     Tobacco Use     Smoking status: Never Smoker     Smokeless tobacco: Never Used   Substance Use Topics     Alcohol use: Yes     Alcohol/week: 0.0 standard drinks     Comment: 1 drink/week     Drug use: No       Fabiola Maher LPN  10/12/2020  1:56 PM  "

## 2020-10-12 NOTE — PROGRESS NOTES
Reason for Visit: 6 week post op for A midurethral sling on 9/15/20.    Clinical Data: Ms. Debbie Baker  is a 45 year old year old   female with a history of the above.   She returns today for her post operative visit.  She states that she is doing well.  No stress incontinence, minimal urinary urgency no urge incontinence.  She is not straining to urinate.  She had a UTI that was treated and she now feels better.    RU is 0 ml on bladder scan by angel.    A/P s/p midurethral sling doing well  -discontinue restrictions of lifting and sexual activity  -f/u as needed if she would like to proceed PTNS      Thank you for allowing me to participate in the care of  Ms.Dawn LENNY Baker   and I will keep you updated on her progress.    Pamela Chilel MD

## 2020-10-12 NOTE — TELEPHONE ENCOUNTER
----- Message from Kel Castillo CMA sent at 10/8/2020  8:51 AM CDT -----  Regarding: UA/UC  Please check UA/UC.    Thanks, Kel Castillo MA     
Patient was treat with Bactribelkis Castillo MA  
UC still pending.      Kel Castillo MA  
A/P:  R/O acute cholecystitis  HIDA scan  Pt on antibiotics  Medical management per primary service  NPO, IV hydration  GI/DVT prophylaxis  Pain control  Incentive spirometry  Serial abd exams  F/U labs  Pt will be monitored for signs of evolution/resolution of pathology and surgical intervention as required and warranted  Will sign out pt to Dr Pritchett for further surgical management and care as warranted  Pt aware of and agrees with all of the above

## 2020-10-12 NOTE — PATIENT INSTRUCTIONS
-discontinue restrictions of lifting and sexual activity  -f/u as needed if she would like to proceed PTNS

## 2020-10-12 NOTE — LETTER
10/12/2020       RE: Debbie Baker  [address]  [place] [facility] [address]     Dear Colleague,    Thank you for referring your patient, Debbie Baker, to the [facility] [place] at [facility]. Please see a copy of my visit note below.    Reason for Visit: 6 week post op for A midurethral sling on 9/15/20.    Clinical Data: Ms. Debbie Baker  is a 45 year old year old   female with a history of the above.   She returns today for her post operative visit.  She states that she is doing well.  No stress incontinence, minimal urinary urgency no urge incontinence.  She is not straining to urinate.  She had a UTI that was treated and she now feels better.    RU is 0 ml on bladder scan by angel.    A/P s/p midurethral sling doing well  -discontinue restrictions of lifting and sexual activity  -f/u as needed if she would like to proceed PTNS      Thank you for allowing me to participate in the care of  Ms.Dawn LENNY Baker   and I will keep you updated on her progress.    Pamela Chilel MD

## 2020-10-26 ENCOUNTER — TELEPHONE (OUTPATIENT)
Dept: FAMILY MEDICINE | Facility: CLINIC | Age: 45
End: 2020-10-26

## 2020-10-26 ENCOUNTER — OFFICE VISIT (OUTPATIENT)
Dept: FAMILY MEDICINE | Facility: CLINIC | Age: 45
End: 2020-10-26
Payer: COMMERCIAL

## 2020-10-26 VITALS
WEIGHT: 143 LBS | DIASTOLIC BLOOD PRESSURE: 80 MMHG | HEART RATE: 70 BPM | BODY MASS INDEX: 26.16 KG/M2 | TEMPERATURE: 98.3 F | SYSTOLIC BLOOD PRESSURE: 120 MMHG

## 2020-10-26 DIAGNOSIS — Z13.6 CARDIOVASCULAR SCREENING; LDL GOAL LESS THAN 100: ICD-10-CM

## 2020-10-26 DIAGNOSIS — L70.0 ACNE VULGARIS: ICD-10-CM

## 2020-10-26 DIAGNOSIS — F32.A DEPRESSIVE DISORDER: Primary | ICD-10-CM

## 2020-10-26 DIAGNOSIS — Z90.722 S/P BILATERAL OOPHORECTOMY: ICD-10-CM

## 2020-10-26 DIAGNOSIS — F33.8 SEASONAL AFFECTIVE DISORDER (H): ICD-10-CM

## 2020-10-26 PROCEDURE — 80061 LIPID PANEL: CPT | Performed by: FAMILY MEDICINE

## 2020-10-26 PROCEDURE — 99203 OFFICE O/P NEW LOW 30 MIN: CPT | Performed by: FAMILY MEDICINE

## 2020-10-26 RX ORDER — TRETINOIN 0.5 MG/G
CREAM TOPICAL
Qty: 45 G | Refills: 1 | Status: SHIPPED | OUTPATIENT
Start: 2020-10-26 | End: 2022-10-10

## 2020-10-26 RX ORDER — BUPROPION HYDROCHLORIDE 150 MG/1
300 TABLET ORAL EVERY MORNING
Qty: 180 TABLET | Refills: 0 | Status: SHIPPED | OUTPATIENT
Start: 2020-10-26 | End: 2020-10-27

## 2020-10-26 ASSESSMENT — ANXIETY QUESTIONNAIRES
3. WORRYING TOO MUCH ABOUT DIFFERENT THINGS: MORE THAN HALF THE DAYS
GAD7 TOTAL SCORE: 9
5. BEING SO RESTLESS THAT IT IS HARD TO SIT STILL: SEVERAL DAYS
IF YOU CHECKED OFF ANY PROBLEMS ON THIS QUESTIONNAIRE, HOW DIFFICULT HAVE THESE PROBLEMS MADE IT FOR YOU TO DO YOUR WORK, TAKE CARE OF THINGS AT HOME, OR GET ALONG WITH OTHER PEOPLE: SOMEWHAT DIFFICULT
7. FEELING AFRAID AS IF SOMETHING AWFUL MIGHT HAPPEN: SEVERAL DAYS
2. NOT BEING ABLE TO STOP OR CONTROL WORRYING: SEVERAL DAYS
1. FEELING NERVOUS, ANXIOUS, OR ON EDGE: NOT AT ALL
6. BECOMING EASILY ANNOYED OR IRRITABLE: NEARLY EVERY DAY

## 2020-10-26 ASSESSMENT — PATIENT HEALTH QUESTIONNAIRE - PHQ9
5. POOR APPETITE OR OVEREATING: SEVERAL DAYS
SUM OF ALL RESPONSES TO PHQ QUESTIONS 1-9: 7

## 2020-10-26 NOTE — TELEPHONE ENCOUNTER
Reason for Call:  Other     Detailed comments: patient was seen this morning and was switched to buPROPion (WELLBUTRIN XL) 150 MG 24 hr tablet however her insurance is not covering. Patient is wondering if she takes Wellbutrin SR 100mg BID 1 1/2 pills twice a day, if that would equal to the same as the Wellbutrin SR 100mg BID      Phone Number Patient can be reached at: Home number on file [phone] (home)    Best Time: anytime    Can we leave a detailed message on this number? YES    Call taken on 10/26/2020 at 1:00 PM by Nancy Logan

## 2020-10-26 NOTE — PROGRESS NOTES
"Subjective     Debbie Baker is a 45 year old female who presents to clinic today for the following health issues:    HPI         New Patient/Transfer of Care  Depression Followup    How are you doing with your depression since your last visit? Worsened in the last few months    Are you having other symptoms that might be associated with depression? Yes:  \"brain is not firing all the way\" when she wakes up in the morning and \"feels low\"    Have you had a significant life event?  No     Are you feeling anxious or having panic attacks?   Yes:  anxious    Do you have any concerns with your use of alcohol or other drugs? No     Has been taking Wellbutrin 200mg daily for about 7 years.  Does not see a therapist.  Had an oophorectomy 6 weeks ago so hasn't been able to exercise.      Has BRCA gene, which is why she got the oophorectomy (preventive).  Gets mammo or MRI of her breasts every 6 months.      Social History     Tobacco Use     Smoking status: Never Smoker     Smokeless tobacco: Never Used   Substance Use Topics     Alcohol use: Yes     Alcohol/week: 0.0 standard drinks     Comment: 1 drink/week     Drug use: No     PHQ 5/29/2019 9/30/2020   PHQ-9 Total Score 1 3   Q9: Thoughts of better off dead/self-harm past 2 weeks Not at all Not at all     No flowsheet data found.    In the past two weeks have you had thoughts of suicide or self-harm?  No.    Do you have concerns about your personal safety or the safety of others?   No    Suicide Assessment Five-step Evaluation and Treatment (SAFE-T)      How many servings of fruits and vegetables do you eat daily?  3-5    On average, how many sweetened beverages do you drink each day (Examples: soda, juice, sweet tea, etc.  Do NOT count diet or artificially sweetened beverages)?   Very little    How many days per week do you exercise enough to make your heart beat faster? 0    How many minutes a day do you exercise enough to make your heart beat faster? 0    How many days " "per week do you miss taking your medication? 0      Review of Systems   Constitutional, HEENT, cardiovascular, pulmonary, GI, , musculoskeletal, neuro, skin, endocrine and psych systems are negative, except as otherwise noted.      Objective    /80 (BP Location: Right arm, Patient Position: Chair, Cuff Size: Adult Regular)   Pulse 70   Temp 98.3  F (36.8  C) (Oral)   Wt 64.9 kg (143 lb)   LMP 08/24/2020 (Approximate)   BMI 26.16 kg/m    Body mass index is 26.16 kg/m .  Physical Exam   GENERAL: healthy, alert and no distress  NECK: no adenopathy, no asymmetry, masses, or scars and thyroid normal to palpation  RESP: lungs clear to auscultation - no rales, rhonchi or wheezes  CV: regular rates and rhythm, normal S1 S2, no S3 or S4 and no murmur, click or rub  PSYCH: mentation appears normal, affect normal/bright        Assessment & Plan     Depressive disorder  - Worsening recently, most likely due to oophorectomy and not being able to exercise  - Currently taking Wellbutrin SR 100mg BID.  Will increase her to 300mg.  She'd like to try the XR dosing   - Follow up in 4-6 weeks for recheck   - buPROPion (WELLBUTRIN XL) 150 MG 24 hr tablet; Take 2 tablets (300 mg) by mouth every morning    Acne vulgaris  - Stable, just needs refills   - tretinoin (RETIN-A) 0.05 % external cream; Apply topically nightly as needed    S/P bilateral oophorectomy  - Done preventatively for BRCA gene and family history     CARDIOVASCULAR SCREENING; LDL GOAL LESS THAN 100  - Lipid panel reflex to direct LDL Non-fasting     BMI:   Estimated body mass index is 26.16 kg/m  as calculated from the following:    Height as of 10/12/20: 1.575 m (5' 2\").    Weight as of this encounter: 64.9 kg (143 lb).              Return in about 6 weeks (around 12/7/2020).    DO NINOSKA Hansen [facility]    "

## 2020-10-26 NOTE — TELEPHONE ENCOUNTER
RN spoke with patient.     She states that she has returned home since today's visit, and states she has been taking Buproprion HCL SR 150mg BID.     Patient and provider were under the impression that she was taking 100mg BID, and therefore increased her dose to 150mg BID.     Routing to PCP to review and reconsider medication adjustment. Pharmacy cued.     Kelly Mckinney RN, BSN, PHN  [facility] [place]: [place]

## 2020-10-26 NOTE — TELEPHONE ENCOUNTER
RN attempted to call patient to clarify request for alternative medication below.     Voicemail box full, unable to leave VM.     Plan to confirm if patient is asking to take 1.5 tablets of Wellbutrin SR 100mg (unsure if can split sustained release tablets)?   Or non sustained Wellbutrin (appears was on previously).     Kelly Mckinney RN, BSN, PHN  [facility]: [place]

## 2020-10-27 LAB
CHOLEST SERPL-MCNC: 176 MG/DL
HDLC SERPL-MCNC: 80 MG/DL
LDLC SERPL CALC-MCNC: 88 MG/DL
NONHDLC SERPL-MCNC: 96 MG/DL
TRIGL SERPL-MCNC: 40 MG/DL

## 2020-10-27 RX ORDER — BUPROPION HYDROCHLORIDE 200 MG/1
200 TABLET, EXTENDED RELEASE ORAL 2 TIMES DAILY
Qty: 180 TABLET | Refills: 1 | Status: SHIPPED | OUTPATIENT
Start: 2020-10-27 | End: 2021-01-07

## 2020-10-27 ASSESSMENT — ANXIETY QUESTIONNAIRES: GAD7 TOTAL SCORE: 9

## 2020-10-27 NOTE — TELEPHONE ENCOUNTER
Reason for Call:  Other returning call    Detailed comments: Patient returning call, please advise.     Phone Number Patient can be reached at: Home number on file [phone] (home)    Best Time: Anytime     Can we leave a detailed message on this number? YES    Call taken on 10/27/2020 at 1:16 PM by Noni Horton

## 2020-10-27 NOTE — TELEPHONE ENCOUNTER
Discussed dosing with patient.  Will discontinue the XL dosing.  Switch to SR 200mg BID.  Patient agrees.

## 2020-11-04 ENCOUNTER — ANCILLARY PROCEDURE (OUTPATIENT)
Dept: MRI IMAGING | Facility: CLINIC | Age: 45
End: 2020-11-04
Attending: CLINICAL NURSE SPECIALIST
Payer: COMMERCIAL

## 2020-11-04 DIAGNOSIS — Z15.09 BRCA2 POSITIVE: ICD-10-CM

## 2020-11-04 DIAGNOSIS — Z15.01 BRCA2 POSITIVE: ICD-10-CM

## 2020-11-04 DIAGNOSIS — R92.8 ABNORMAL FINDING ON BREAST IMAGING: ICD-10-CM

## 2020-11-04 PROCEDURE — 77049 MRI BREAST C-+ W/CAD BI: CPT | Performed by: RADIOLOGY

## 2020-11-04 RX ORDER — GADOBUTROL 604.72 MG/ML
7.5 INJECTION INTRAVENOUS ONCE
Status: COMPLETED | OUTPATIENT
Start: 2020-11-04 | End: 2020-11-04

## 2020-11-04 RX ADMIN — GADOBUTROL 6 ML: 604.72 INJECTION INTRAVENOUS at 10:19

## 2020-11-04 NOTE — DISCHARGE INSTRUCTIONS
MRI Contrast Discharge Instructions    The IV contrast you received today will pass out of your body in your  urine. This will happen in the next 24 hours. You will not feel this process.  Your urine will not change color.    Drink at least 4 extra glasses of water or juice today (unless your doctor  has restricted your fluids). This reduces the stress on your kidneys.  You may take your regular medicines.    If you are on dialysis: It is best to have dialysis today.    If you have a reaction: Most reactions happen right away. If you have  any new symptoms after leaving the hospital (such as hives or swelling),  call your hospital at the correct number below. Or call your family doctor.  If you have breathing distress or wheezing, call 911.    Special instructions: ***    I have read and understand the above information.    Signature:______________________________________ Date:___________    Staff:__________________________________________ Date:___________     Time:__________    [facility] Departments:    ___[place]: [phone]  ___[facility]: [phone]  ___[place]: [phone] ___[facility]: [phone]  ___[facility]: [phone]  ___[facility]: [phone]  ___[phone]  ___[facility]: [phone]  ___[phone]

## 2020-11-10 ENCOUNTER — VIRTUAL VISIT (OUTPATIENT)
Dept: ONCOLOGY | Facility: CLINIC | Age: 45
End: 2020-11-10
Attending: CLINICAL NURSE SPECIALIST
Payer: COMMERCIAL

## 2020-11-10 DIAGNOSIS — Z15.01 BRCA2 GENE MUTATION POSITIVE IN FEMALE: Primary | ICD-10-CM

## 2020-11-10 DIAGNOSIS — Z15.09 BRCA2 GENE MUTATION POSITIVE IN FEMALE: Primary | ICD-10-CM

## 2020-11-10 DIAGNOSIS — R92.30 DENSE BREAST: ICD-10-CM

## 2020-11-10 DIAGNOSIS — Z15.02 BRCA2 GENE MUTATION POSITIVE IN FEMALE: Primary | ICD-10-CM

## 2020-11-10 PROCEDURE — 99214 OFFICE O/P EST MOD 30 MIN: CPT | Mod: GT | Performed by: CLINICAL NURSE SPECIALIST

## 2020-11-10 NOTE — LETTER
"    11/10/2020         RE: eDbbie Baker  [address]  [facility] [address]        Dear Colleague,    Thank you for referring your patient, Debbie Baker, to the [facility]. Please see a copy of my visit note below.    Debbie Baker is a 45 year old female who is being evaluated via a billable video visit.      The patient has been notified of following:     \"This video visit will be conducted via a call between you and your physician/provider. We have found that certain health care needs can be provided without the need for an in-person physical exam.  This service lets us provide the care you need with a video conversation.  If a prescription is necessary we can send it directly to your pharmacy.  If lab work is needed we can place an order for that and you can then stop by our lab to have the test done at a later time.    Video visits are billed at different rates depending on your insurance coverage.  Please reach out to your insurance provider with any questions.    If during the course of the call the physician/provider feels a video visit is not appropriate, you will not be charged for this service.\"    Patient has given verbal consent for Video visit? Yes  How would you like to obtain your AVS? MyChart  If you are dropped from the video visit, the video invite should be resent to: Send to e-mail at: [email or web address]  Will anyone else be joining your video visit? No      WALTER Peña    Video-Visit Details    Type of service:  Video Visit    Video Start Time: 1300  Video End Time: 1326    Originating Location (pt. Location): Home    Distant Location (provider location):  [facility]     Platform used for Video Visit: RadarChile    Oncology Risk Management Consultation:  Date on this visit: 11/10/2020    Debbie Baker is being seen via a video visit today for follow up.  She requires screening and surveillance to minimize her risk of cancer " secondary to having a deleterious BRCA2 mutation.  She is considered to be at high risk for hereditary breast and ovarian cancer.    Primary Physician: Aspen Renteria MD    History Of Present Illness:  Ms. Baker is a very pleasant, healthy 45 year old female who presents with BRCA2+ associated Hereditary Breast and Ovarian Cancer Syndrome.      Pertinent history:  Menarche at age 11  First child at age 30  Hx of breastfeeding x 6-8 months  Breast density: heterogeneously fibroglandular tissue.  9/15/2019- Prophylactic Bilateral salpingectomy and oophorectomy.   Pathology:  Ovaries with corpus lutea   - Benign cortical inclusion cysts   - Fallopian tubes with no significant histologic abnormality   - Negative for malignancy or premalignant lesions  Reports a 15-20 year history of oral contraceptive use and short term use of Nuvaring and Provera.  Her only past history with breast problems was an episode of mastitis when she was breastfeeding her children  History of one breast biopsy - fibroadenoma. No atypia or malignancy.     Screening history:  11/17/2014, Screening, mammogram, BiRads1.   3/11/2016 - Screening mammogram, BiRads1  9/9/2016 - Breast MRI, BiRads1.  3/10/2017 - Screening tomosynthesis mammogram, BiRads1.  9/15/2017 - Breast MRI, BiRads1.  3/30/2018 - Screening tomosynthesis mammogram, BiRads1.  9/28/2018 - Breast MRI, BiRads1.  4/12/2019- Screening tomosynthesis mammogram, BiRads1  10/24/2019- Breast MRI, BiRads4 for mass in right breast  10/31/2019- US core needle biopsy, right breast, Pathology: fibroadenoma  11/4/2020- Breast MRI, BiRads2    Past Medical/Surgical History:  Past Medical History:   Diagnosis Date     Allergic rhinitis      At high risk for breast cancer 01/12/2016    BRCA2+     At risk for cancer 01/12/2016    at risk for ovarian/fallopian tube cancer, BRCA2+     BRCA2 positive 01/12/2016    [id number]; single site analysis through ki work     Depression      Mastitis in  female      Past Surgical History:   Procedure Laterality Date     CARPAL TUNNEL RELEASE RT/LT Bilateral     2018     CYSTOSCOPY, SLING TRANSVAGINAL N/A 9/15/2020    Procedure: Midurethral sling and cystoscopy;  Surgeon: Pamela Chilel MD;  Location: [facility] OR     LAPAROSCOPIC SALPINGO-OOPHORECTOMY Bilateral 9/15/2020    Procedure: laparoscopic bilateral salpingo-oophorectomy, pelvic washings;  Surgeon: Celeste Murillo MD;  Location: [facility] OR     wisdom teeth         Allergies:  Allergies as of 11/10/2020 - Reviewed 11/10/2020   Allergen Reaction Noted     Dust mites Other (See Comments) 2016       Current Medications:  Current Outpatient Medications   Medication Sig Dispense Refill     buPROPion (WELLBUTRIN SR) 200 MG 12 hr tablet Take 1 tablet (200 mg) by mouth 2 times daily 180 tablet 1     Cholecalciferol (VITAMIN D) 2000 UNITS tablet Take 2,000 Units by mouth every morning        fluticasone (FLONASE) 50 MCG/ACT nasal spray Spray 1 spray into both nostrils as needed        ibuprofen (ADVIL/MOTRIN) 600 MG tablet Take 1 tablet (600 mg) by mouth every 6 hours as needed for moderate pain       Multiple Vitamin (M.V.I. ADULT IV) Take 1 tablet by mouth every morning        tretinoin (RETIN-A) 0.05 % external cream Apply topically nightly as needed 45 g 1        Family History:  Family History   Problem Relation Age of Onset     Hypertension Mother      Hypertension Father      Hearing Loss Father      Valvular heart disease Father         s/p mitral valve replacement     Cerebrovascular Disease Father         TIAs     Ovarian Cancer Maternal Grandmother 59         at 61     Pancreatic Cancer Maternal Grandfather 87         at 87     Skin Cancer Paternal Grandmother 88     Bladder Cancer Paternal Grandfather 60         at 80     Cancer Paternal Grandfather         Bladder     Breast Cancer Maternal Aunt 51        BRCA2+     No Known Problems Sister      No Known Problems Brother        Social  History:  Social History     Socioeconomic History     Marital status:      Spouse name: Tavares     Number of children: 2     Years of education: Not on file     Highest education level: Not on file   Occupational History     Occupation: unemployed   Social Needs     Financial resource strain: Not on file     Food insecurity     Worry: Not on file     Inability: Not on file     Transportation needs     Medical: Not on file     Non-medical: Not on file   Tobacco Use     Smoking status: Never Smoker     Smokeless tobacco: Never Used   Substance and Sexual Activity     Alcohol use: Yes     Alcohol/week: 0.0 standard drinks     Comment: 1 drink/week     Drug use: No     Sexual activity: Yes     Partners: Male     Birth control/protection: Surgical     Comment: salpingo oophorectomy   Lifestyle     Physical activity     Days per week: Not on file     Minutes per session: Not on file     Stress: Not on file   Relationships     Social connections     Talks on phone: Not on file     Gets together: Not on file     Attends Hoahaoism service: Not on file     Active member of club or organization: Not on file     Attends meetings of clubs or organizations: Not on file     Relationship status: Not on file     Intimate partner violence     Fear of current or ex partner: Not on file     Emotionally abused: Not on file     Physically abused: Not on file     Forced sexual activity: Not on file   Other Topics Concern      Service Not Asked     Blood Transfusions Not Asked     Caffeine Concern No     Occupational Exposure No     Hobby Hazards No     Sleep Concern No     Stress Concern Yes     Weight Concern No     Special Diet No     Back Care No     Exercise Yes     Comment: works out with , does yoga 3-4x/week     Bike Helmet Not Asked     Seat Belt Yes     Self-Exams Yes     Parent/sibling w/ CABG, MI or angioplasty before 65F 55M? No   Social History Narrative     Not on file       Physical Exam:  LMP  08/24/2020 (Approximate)   GENERAL: Healthy, alert and no distress  EYES: Eyes grossly normal to inspection.  No discharge or erythema, or obvious scleral/conjunctival abnormalities.  RESP: No audible wheeze, cough, or visible cyanosis.  No visible retractions or increased work of breathing.    SKIN: Visible skin clear. No significant rash, abnormal pigmentation or lesions.  NEURO: Cranial nerves grossly intact.  Mentation and speech appropriate for age.  PSYCH: Mentation appears normal, affect normal/bright, judgement and insight intact, normal speech and appearance well-groomed.    Laboratory/Imaging Studies  No results found for any visits on 11/10/20.    ASSESSMENT  We reviewed her breast MRI results, which were negative and reviewed her interval history.  She has been having some hot flashes during the day and at night. She states these are manageable for now. She may consider HRT in the future; we discussed different scenarios as she has an intact uterus and would need both estrogen and progesterone.  We discussed that it is likely considered to be safe, taken for a short length of time at the lowest doses possible.      She has no other complaints since her surgery. Her son was diagnosed with CoVid in August; her daughter and the rest of the family did not get it. She does practice hot yoga routinely at the [facility] and finds that beneficial in managing stress and helping her feel better overall. She will continue that and is trying to increase her exercise now that she has post-surgical clearance to do so.  She enjoys running and would like to continue that.     She is aware that women with BRCA 2 mutations have a 45-49% lifetime risk of breast cancer, compared to the general population risk of 12% and a contralateral female breast cancer risk of 34.6% within 10 years of initial breast cancer diagnosis WITHOUT intervention.  Those with a BRCA2 mutation also bear an approximate 11-18% lifetime risk of  ovarian cancer, including primary peritoneal and fallopian tube cancer, compared to the 1-2% lifetime risk within the general population. The mean age diagnosis of ovarian cancer for BRCA1/2 carriers is estimated to be 50.8 years old.  There is also an increased risk of pancreatic cancer and melanoma. Male BRCA2 mutation carriers have a cumulative breast cancer risk of over 6% by age 70 and prostate cancer risk of approximately 15% by age 65.    We discussed the new guidelines for BRCA2+ carriers, which include pancreatic screening. Her maternal grandfather did have pancreatic cancer at 87, but it is more likely that the gene came through her maternal grandmother, who has ovarian cancer. We discussed that his case was more likely a sporadic case of pancreatic cancer rather than a hereditary form.  She will continue to assess her family history but at this point, no pancreatic screening is recommended.    At this point, she will continue with the screening plan below.      INDIVIDUALIZED CANCER RISK MANAGEMENT PLAN:  Individualized Surveillance Plan for women  Hereditary Breast and/or Ovarian Cancer Syndrome   Per NCCN Guidelines Version 1.2021   Recommended screening Test or procedure Last done Next Scheduled    Breast self awareness starting at age 18. Women should be familiar with their breasts and promptly report changes to their care provider. Periodic, consistent self exam may facilitate breast self awareness. Premenopausal women may find self exams to be most informative when performed at the end of menses.   11/10/2020   May 2021   Breast screening, starting at age 25 Clinical breast exams every 6 -12 months Deferred May 2021 - may be deferred due to Covid   Breast screening   Age 25-29 Annual breast MRI screening with contrast (or mammogram if MRI is unavailable) or individualized based on family history if a breast cancer diagnosis before age 30 is present.       Breast MRI is performed preferably on day  7-15 of menstrual cycle for premenopausal women.   See below   See below       Breast screening   Age >30-75 years     Annual mammogram (consider tomosynthesis mammogram) and annual screening MRI.     Breast MRI is performed preferably on day 7-15 of menstrual cycle for premenopausal women.    Age>75 years, management should be considered on an individual basis. 11/4/2020- Breast MRI, BiRads2    4/2019- Mammogram, BiRads1 Screening mammogram after exam in May 2021    Next breast MRI: November 2021   Ovarian cancer screening, starting at age 30-35 Consider transvaginal ultrasound and  tests. 9/2020- Bilateral salpingo oophorectomy NA   Pancreatic Cancer Screening beginning at age 50 or 10 years prior to the earliest pancreatic cancer on the BRCA-side of the family  In families with exocrine pancreatic cancer in a first or second degree relative     Consider Annual MRI/MRCP and/or Endoscopic Ultrasound FH of pancreatic cancer in maternal grandfather at 87 (not likely  BRCA associated) Discuss in future   Recommendation: risk-reducing salpingo-oophorectomy(RRSO), typically between 35 and 40 years old and  upon completion of child bearing.     Because ovarian cancer onset in patients with BRCA2 mutations is on average of 8-10 years later than in patients with BRCA1 mutations, it is reasonable to delay RRSO until 40-45 years in patients with BRCA2 mutations  unless age at diagnosis in the family warrants earlier age for consideration for prophylactic surgery.    Limited data suggest that there may be a slightly increased risk of serous uterine cancer among women with BRCA1+ pathogenic variants. The provider and the patient should discuss the risks and benefits of a concurrent hysterectomy at the time of RRSO for women with a BRCA1+ pathogenic variant /like pathogenic variant prior to surgery.     Salpingectomy alone is not the standard of care for risk reduction although clinical trials are ongoing.     Discuss  option of risk-reducing mastectomy.    Consider risks and benefits of risk reducing agents, such as tamoxifen and raloxifene for breast and ovarian cancer.    Consider a full body skin and eye exam for melanoma screening for both BRCA1+ and BRCA2+.     NOTE: Women with BRCA mutation who are treated for breast cancer should have screening of the remaining breast tissue with annual mammography and breast MRI.       I spent 25 minutes with the patient with greater that 50% of it in counseling and coordinating care as documented above.    Meliza Najera, APRN-CNS, OCN, AGN-BC  Clinical Nurse Specialist  Cancer Risk Management Program  [facility] [address]  [place] [address]    phone:  [phone]  Pager: [phone]  fax: [phone]    CC:  Aspen Renteria MD

## 2020-11-10 NOTE — PROGRESS NOTES
"Debbie Baker is a 45 year old female who is being evaluated via a billable video visit.      The patient has been notified of following:     \"This video visit will be conducted via a call between you and your physician/provider. We have found that certain health care needs can be provided without the need for an in-person physical exam.  This service lets us provide the care you need with a video conversation.  If a prescription is necessary we can send it directly to your pharmacy.  If lab work is needed we can place an order for that and you can then stop by our lab to have the test done at a later time.    Video visits are billed at different rates depending on your insurance coverage.  Please reach out to your insurance provider with any questions.    If during the course of the call the physician/provider feels a video visit is not appropriate, you will not be charged for this service.\"    Patient has given verbal consent for Video visit? Yes  How would you like to obtain your AVS? MyChart  If you are dropped from the video visit, the video invite should be resent to: Send to e-mail at: [email or web address]  Will anyone else be joining your video visit? No      WALTER Peña    Video-Visit Details    Type of service:  Video Visit    Video Start Time: 1300  Video End Time: 1326    Originating Location (pt. Location): Home    Distant Location (provider location):  [facility]     Platform used for Video Visit: [facility]    Oncology Risk Management Consultation:  Date on this visit: 11/10/2020    Debbie Baker is being seen via a video visit today for follow up.  She requires screening and surveillance to minimize her risk of cancer secondary to having a deleterious BRCA2 mutation.  She is considered to be at high risk for hereditary breast and ovarian cancer.    Primary Physician: Aspen Renteria MD    History Of Present Illness:  Ms. Baker is a very pleasant, healthy 45 year old female " who presents with BRCA2+ associated Hereditary Breast and Ovarian Cancer Syndrome.      Pertinent history:  Menarche at age 11  First child at age 30  Hx of breastfeeding x 6-8 months  Breast density: heterogeneously fibroglandular tissue.  9/15/2019- Prophylactic Bilateral salpingectomy and oophorectomy.   Pathology:  Ovaries with corpus lutea   - Benign cortical inclusion cysts   - Fallopian tubes with no significant histologic abnormality   - Negative for malignancy or premalignant lesions  Reports a 15-20 year history of oral contraceptive use and short term use of Nuvaring and Provera.  Her only past history with breast problems was an episode of mastitis when she was breastfeeding her children  History of one breast biopsy - fibroadenoma. No atypia or malignancy.     Screening history:  11/17/2014, Screening, mammogram, BiRads1.   3/11/2016 - Screening mammogram, BiRads1  9/9/2016 - Breast MRI, BiRads1.  3/10/2017 - Screening tomosynthesis mammogram, BiRads1.  9/15/2017 - Breast MRI, BiRads1.  3/30/2018 - Screening tomosynthesis mammogram, BiRads1.  9/28/2018 - Breast MRI, BiRads1.  4/12/2019- Screening tomosynthesis mammogram, BiRads1  10/24/2019- Breast MRI, BiRads4 for mass in right breast  10/31/2019- US core needle biopsy, right breast, Pathology: fibroadenoma  11/4/2020- Breast MRI, BiRads2    Past Medical/Surgical History:  Past Medical History:   Diagnosis Date     Allergic rhinitis      At high risk for breast cancer 01/12/2016    BRCA2+     At risk for cancer 01/12/2016    at risk for ovarian/fallopian tube cancer, BRCA2+     BRCA2 positive 01/12/2016    [id number]; single site analysis through [facility]     Depression      Mastitis in female 2010     Past Surgical History:   Procedure Laterality Date     CARPAL TUNNEL RELEASE RT/LT Bilateral     2018     CYSTOSCOPY, SLING TRANSVAGINAL N/A 9/15/2020    Procedure: Midurethral sling and cystoscopy;  Surgeon: Pamela Chilel MD;  Location:  [facility] OR     LAPAROSCOPIC SALPINGO-OOPHORECTOMY Bilateral 9/15/2020    Procedure: laparoscopic bilateral salpingo-oophorectomy, pelvic washings;  Surgeon: Celeste Murillo MD;  Location: [facility] OR     wisdom teeth         Allergies:  Allergies as of 11/10/2020 - Reviewed 11/10/2020   Allergen Reaction Noted     Dust mites Other (See Comments) 2016       Current Medications:  Current Outpatient Medications   Medication Sig Dispense Refill     buPROPion (WELLBUTRIN SR) 200 MG 12 hr tablet Take 1 tablet (200 mg) by mouth 2 times daily 180 tablet 1     Cholecalciferol (VITAMIN D) 2000 UNITS tablet Take 2,000 Units by mouth every morning        fluticasone (FLONASE) 50 MCG/ACT nasal spray Spray 1 spray into both nostrils as needed        ibuprofen (ADVIL/MOTRIN) 600 MG tablet Take 1 tablet (600 mg) by mouth every 6 hours as needed for moderate pain       Multiple Vitamin (M.V.I. ADULT IV) Take 1 tablet by mouth every morning        tretinoin (RETIN-A) 0.05 % external cream Apply topically nightly as needed 45 g 1        Family History:  Family History   Problem Relation Age of Onset     Hypertension Mother      Hypertension Father      Hearing Loss Father      Valvular heart disease Father         s/p mitral valve replacement     Cerebrovascular Disease Father         TIAs     Ovarian Cancer Maternal Grandmother 59         at 61     Pancreatic Cancer Maternal Grandfather 87         at 87     Skin Cancer Paternal Grandmother 88     Bladder Cancer Paternal Grandfather 60         at 80     Cancer Paternal Grandfather         Bladder     Breast Cancer Maternal Aunt 51        BRCA2+     No Known Problems Sister      No Known Problems Brother        Social History:  Social History     Socioeconomic History     Marital status:      Spouse name: Tavares     Number of children: 2     Years of education: Not on file     Highest education level: Not on file   Occupational History     Occupation: unemployed    Social Needs     Financial resource strain: Not on file     Food insecurity     Worry: Not on file     Inability: Not on file     Transportation needs     Medical: Not on file     Non-medical: Not on file   Tobacco Use     Smoking status: Never Smoker     Smokeless tobacco: Never Used   Substance and Sexual Activity     Alcohol use: Yes     Alcohol/week: 0.0 standard drinks     Comment: 1 drink/week     Drug use: No     Sexual activity: Yes     Partners: Male     Birth control/protection: Surgical     Comment: salpingo oophorectomy   Lifestyle     Physical activity     Days per week: Not on file     Minutes per session: Not on file     Stress: Not on file   Relationships     Social connections     Talks on phone: Not on file     Gets together: Not on file     Attends Cheondoism service: Not on file     Active member of club or organization: Not on file     Attends meetings of clubs or organizations: Not on file     Relationship status: Not on file     Intimate partner violence     Fear of current or ex partner: Not on file     Emotionally abused: Not on file     Physically abused: Not on file     Forced sexual activity: Not on file   Other Topics Concern      Service Not Asked     Blood Transfusions Not Asked     Caffeine Concern No     Occupational Exposure No     Hobby Hazards No     Sleep Concern No     Stress Concern Yes     Weight Concern No     Special Diet No     Back Care No     Exercise Yes     Comment: works out with , does yoga 3-4x/week     Bike Helmet Not Asked     Seat Belt Yes     Self-Exams Yes     Parent/sibling w/ CABG, MI or angioplasty before 65F 55M? No   Social History Narrative     Not on file       Physical Exam:  [facility] 08/24/2020 (Approximate)   GENERAL: Healthy, alert and no distress  EYES: Eyes grossly normal to inspection.  No discharge or erythema, or obvious scleral/conjunctival abnormalities.  RESP: No audible wheeze, cough, or visible cyanosis.  No visible  retractions or increased work of breathing.    SKIN: Visible skin clear. No significant rash, abnormal pigmentation or lesions.  NEURO: Cranial nerves grossly intact.  Mentation and speech appropriate for age.  PSYCH: Mentation appears normal, affect normal/bright, judgement and insight intact, normal speech and appearance well-groomed.    Laboratory/Imaging Studies  No results found for any visits on 11/10/20.    ASSESSMENT  We reviewed her breast MRI results, which were negative and reviewed her interval history.  She has been having some hot flashes during the day and at night. She states these are manageable for now. She may consider HRT in the future; we discussed different scenarios as she has an intact uterus and would need both estrogen and progesterone.  We discussed that it is likely considered to be safe, taken for a short length of time at the lowest doses possible.      She has no other complaints since her surgery. Her son was diagnosed with CoVid in August; her daughter and the rest of the family did not get it. She does practice hot yoga routinely at the [facility] and finds that beneficial in managing stress and helping her feel better overall. She will continue that and is trying to increase her exercise now that she has post-surgical clearance to do so.  She enjoys running and would like to continue that.     She is aware that women with BRCA 2 mutations have a 45-49% lifetime risk of breast cancer, compared to the general population risk of 12% and a contralateral female breast cancer risk of 34.6% within 10 years of initial breast cancer diagnosis WITHOUT intervention.  Those with a BRCA2 mutation also bear an approximate 11-18% lifetime risk of ovarian cancer, including primary peritoneal and fallopian tube cancer, compared to the 1-2% lifetime risk within the general population. The mean age diagnosis of ovarian cancer for BRCA1/2 carriers is estimated to be 50.8 years old.  There is also  an increased risk of pancreatic cancer and melanoma. Male BRCA2 mutation carriers have a cumulative breast cancer risk of over 6% by age 70 and prostate cancer risk of approximately 15% by age 65.    We discussed the new guidelines for BRCA2+ carriers, which include pancreatic screening. Her maternal grandfather did have pancreatic cancer at 87, but it is more likely that the gene came through her maternal grandmother, who has ovarian cancer. We discussed that his case was more likely a sporadic case of pancreatic cancer rather than a hereditary form.  She will continue to assess her family history but at this point, no pancreatic screening is recommended.    At this point, she will continue with the screening plan below.      INDIVIDUALIZED CANCER RISK MANAGEMENT PLAN:  Individualized Surveillance Plan for women  Hereditary Breast and/or Ovarian Cancer Syndrome   Per NCCN Guidelines Version 1.2021   Recommended screening Test or procedure Last done Next Scheduled    Breast self awareness starting at age 18. Women should be familiar with their breasts and promptly report changes to their care provider. Periodic, consistent self exam may facilitate breast self awareness. Premenopausal women may find self exams to be most informative when performed at the end of menses.   11/10/2020   May 2021   Breast screening, starting at age 25 Clinical breast exams every 6 -12 months Deferred May 2021 - may be deferred due to Covid   Breast screening   Age 25-29 Annual breast MRI screening with contrast (or mammogram if MRI is unavailable) or individualized based on family history if a breast cancer diagnosis before age 30 is present.       Breast MRI is performed preferably on day 7-15 of menstrual cycle for premenopausal women.   See below   See below       Breast screening   Age >30-75 years     Annual mammogram (consider tomosynthesis mammogram) and annual screening MRI.     Breast MRI is performed preferably on day 7-15 of  menstrual cycle for premenopausal women.    Age>75 years, management should be considered on an individual basis. 11/4/2020- Breast MRI, BiRads2    4/2019- Mammogram, BiRads1 Screening mammogram after exam in May 2021    Next breast MRI: November 2021   Ovarian cancer screening, starting at age 30-35 Consider transvaginal ultrasound and  tests. 9/2020- Bilateral salpingo oophorectomy NA   Pancreatic Cancer Screening beginning at age 50 or 10 years prior to the earliest pancreatic cancer on the BRCA-side of the family  In families with exocrine pancreatic cancer in a first or second degree relative     Consider Annual MRI/MRCP and/or Endoscopic Ultrasound FH of pancreatic cancer in maternal grandfather at 87 (not likely  BRCA associated) Discuss in future   Recommendation: risk-reducing salpingo-oophorectomy(RRSO), typically between 35 and 40 years old and  upon completion of child bearing.     Because ovarian cancer onset in patients with BRCA2 mutations is on average of 8-10 years later than in patients with BRCA1 mutations, it is reasonable to delay RRSO until 40-45 years in patients with BRCA2 mutations  unless age at diagnosis in the family warrants earlier age for consideration for prophylactic surgery.    Limited data suggest that there may be a slightly increased risk of serous uterine cancer among women with BRCA1+ pathogenic variants. The provider and the patient should discuss the risks and benefits of a concurrent hysterectomy at the time of RRSO for women with a BRCA1+ pathogenic variant /like pathogenic variant prior to surgery.     Salpingectomy alone is not the standard of care for risk reduction although clinical trials are ongoing.     Discuss option of risk-reducing mastectomy.    Consider risks and benefits of risk reducing agents, such as tamoxifen and raloxifene for breast and ovarian cancer.    Consider a full body skin and eye exam for melanoma screening for both BRCA1+ and BRCA2+.      NOTE: Women with BRCA mutation who are treated for breast cancer should have screening of the remaining breast tissue with annual mammography and breast MRI.       I spent 25 minutes with the patient with greater that 50% of it in counseling and coordinating care as documented above.    HENRIQUE Angela-CNS, OCN, AGN-BC  Clinical Nurse Specialist  Cancer Risk Management Program  [facility] [address]  [place] [address]    phone:  [phone]  Pager: [phone]  fax: [phone]    CC:  Aspen Renteria MD

## 2020-11-16 ENCOUNTER — HEALTH MAINTENANCE LETTER (OUTPATIENT)
Age: 45
End: 2020-11-16

## 2020-12-04 ENCOUNTER — VIRTUAL VISIT (OUTPATIENT)
Dept: FAMILY MEDICINE | Facility: CLINIC | Age: 45
End: 2020-12-04
Payer: COMMERCIAL

## 2020-12-04 DIAGNOSIS — N39.41 URGE INCONTINENCE OF URINE: ICD-10-CM

## 2020-12-04 DIAGNOSIS — F32.0 CURRENT MILD EPISODE OF MAJOR DEPRESSIVE DISORDER WITHOUT PRIOR EPISODE (H): Primary | ICD-10-CM

## 2020-12-04 DIAGNOSIS — F33.8 SEASONAL AFFECTIVE DISORDER (H): ICD-10-CM

## 2020-12-04 DIAGNOSIS — Z90.722 S/P BILATERAL OOPHORECTOMY: ICD-10-CM

## 2020-12-04 PROCEDURE — 99213 OFFICE O/P EST LOW 20 MIN: CPT | Mod: TEL | Performed by: FAMILY MEDICINE

## 2020-12-04 RX ORDER — TOLTERODINE TARTRATE 1 MG/1
1 TABLET, EXTENDED RELEASE ORAL 2 TIMES DAILY
Qty: 180 TABLET | Refills: 1 | Status: SHIPPED | OUTPATIENT
Start: 2020-12-04 | End: 2021-01-07

## 2020-12-04 ASSESSMENT — PATIENT HEALTH QUESTIONNAIRE - PHQ9: SUM OF ALL RESPONSES TO PHQ QUESTIONS 1-9: 12

## 2020-12-04 NOTE — PROGRESS NOTES
"Debbie Baker is a 45 year old female who is being evaluated via a billable telephone visit.      The patient has been notified of following:     \"This telephone visit will be conducted via a call between you and your physician/provider. We have found that certain health care needs can be provided without the need for a physical exam.  This service lets us provide the care you need with a short phone conversation.  If a prescription is necessary we can send it directly to your pharmacy.  If lab work is needed we can place an order for that and you can then stop by our lab to have the test done at a later time.    Telephone visits are billed at different rates depending on your insurance coverage. During this emergency period, for some insurers they may be billed the same as an in-person visit.  Please reach out to your insurance provider with any questions.    If during the course of the call the physician/provider feels a telephone visit is not appropriate, you will not be charged for this service.\"    Patient has given verbal consent for Telephone visit?  Yes    What phone number would you like to be contacted at? [phone]    How would you like to obtain your AVS? MyChart     ===============================================================================    Subjective     Debbie Baker is a 45 year old female who presents via phone visit today for the following health issues:    HPI     Depression Followup    How are you doing with your depression since your last visit? Improved    Are you having other symptoms that might be associated with depression? No    Have you had a significant life event?  No     Are you feeling anxious or having panic attacks?   No    Do you have any concerns with your use of alcohol or other drugs? No     Patient noted worsening symptoms during her last visit in October.  Increased dose of Wellbutrin.  She feels like things have improved.  She's also been exercising a lot, which helps. "      She has a history of a mesh bladder sling in the past.  It generally helps with stress incontinence, but continues to have urge incontinence.  For example, if she goes on a walk she has a severe urge to urinate when she gets close to her house.  She has had accidents.  Of note, had a bilateral oophorectomy in September 2020.      Social History     Tobacco Use     Smoking status: Never Smoker     Smokeless tobacco: Never Used   Substance Use Topics     Alcohol use: Yes     Alcohol/week: 0.0 standard drinks     Comment: 3 drink/week     Drug use: No     PHQ 9/30/2020 10/26/2020 12/4/2020   PHQ-9 Total Score 3 7 12   Q9: Thoughts of better off dead/self-harm past 2 weeks Not at all Not at all Not at all     JENA-7 SCORE 10/26/2020   Total Score 9     Last PHQ-9 12/4/2020   1.  Little interest or pleasure in doing things 1   2.  Feeling down, depressed, or hopeless 0   3.  Trouble falling or staying asleep, or sleeping too much 3   4.  Feeling tired or having little energy 1   5.  Poor appetite or overeating 3   6.  Feeling bad about yourself 2   7.  Trouble concentrating 2   8.  Moving slowly or restless 0   Q9: Thoughts of better off dead/self-harm past 2 weeks 0   PHQ-9 Total Score 12   Difficulty at work, home, or with people Not difficult at all     JENA-7  10/26/2020   1. Feeling nervous, anxious, or on edge 0   2. Not being able to stop or control worrying 1   3. Worrying too much about different things 2   4. Trouble relaxing 1   5. Being so restless that it is hard to sit still 1   6. Becoming easily annoyed or irritable 3   7. Feeling afraid, as if something awful might happen 1   JENA-7 Total Score 9   If you checked any problems, how difficult have they made it for you to do your work, take care of things at home, or get along with other people? Somewhat difficult     In the past two weeks have you had thoughts of suicide or self-harm?  No.    Do you have concerns about your personal safety or the safety  of others?   No        How many servings of fruits and vegetables do you eat daily?  3-5    On average, how many sweetened beverages do you drink each day (Examples: soda, juice, sweet tea, etc.  Do NOT count diet or artificially sweetened beverages)?   1    How many days per week do you exercise enough to make your heart beat faster? 4    How many minutes a day do you exercise enough to make your heart beat faster? 60 or more    How many days per week do you miss taking your medication? 0    Review of Systems   Constitutional, HEENT, cardiovascular, pulmonary, gi and gu systems are negative, except as otherwise noted.       Objective      Vitals:  No vitals were obtained today due to virtual visit.    healthy, alert and no distress  PSYCH: Alert and oriented times 3; coherent speech, normal   rate and volume, able to articulate logical thoughts, able   to abstract reason, no tangential thoughts, no hallucinations   or delusions  Her affect is normal  RESP: No cough, no audible wheezing, able to talk in full sentences  Remainder of exam unable to be completed due to telephone visits          Assessment/Plan:    Assessment & Plan     Current mild episode of major depressive disorder without prior episode (H)  Seasonal affective disorder (H)  - Improved after increasing dose of wellbutrin   - Continue Wellbutrin at current dose    Urge incontinence of urine  S/P bilateral oophorectomy  - S/P bladder sling in the past and also had a bilateral oophorectomy in September this year (cancer prevention)  - Now noticing urge incontinence issues  - Discussed trying an antimuscarinic vs vaginal estrogen vs pelvic floor physical therapy  - She would like to try an antimuscarinic.  Detrol sent   - tolterodine (DETROL) 1 MG tablet; Take 1 tablet (1 mg) by mouth 2 times daily        Return in about 6 months (around 6/4/2021).  In the meantime, she will let me know via [employer] if the Detrol is working or not    Zelad Busby,    [facility]    Phone call duration:  10 minutes

## 2021-01-04 DIAGNOSIS — F32.A DEPRESSIVE DISORDER: ICD-10-CM

## 2021-01-04 DIAGNOSIS — F33.8 SEASONAL AFFECTIVE DISORDER (H): ICD-10-CM

## 2021-01-04 DIAGNOSIS — N39.41 URGE INCONTINENCE OF URINE: ICD-10-CM

## 2021-01-04 NOTE — TELEPHONE ENCOUNTER
[facility] faxed Patient Request new Prescription for   - tolterodine (DETROL) 1 MG tablet  - buPROPion (WELLBUTRIN SR) 200 MG 12 hr tablet

## 2021-01-05 ENCOUNTER — TELEPHONE (OUTPATIENT)
Dept: FAMILY MEDICINE | Facility: CLINIC | Age: 46
End: 2021-01-05

## 2021-01-05 ENCOUNTER — VIRTUAL VISIT (OUTPATIENT)
Dept: FAMILY MEDICINE | Facility: CLINIC | Age: 46
End: 2021-01-05
Payer: COMMERCIAL

## 2021-01-05 DIAGNOSIS — N30.01 ACUTE CYSTITIS WITH HEMATURIA: Primary | ICD-10-CM

## 2021-01-05 DIAGNOSIS — R35.0 URINARY FREQUENCY: ICD-10-CM

## 2021-01-05 LAB
ALBUMIN UR-MCNC: NEGATIVE MG/DL
APPEARANCE UR: CLEAR
BACTERIA #/AREA URNS HPF: ABNORMAL /HPF
BILIRUB UR QL STRIP: NEGATIVE
COLOR UR AUTO: YELLOW
GLUCOSE UR STRIP-MCNC: NEGATIVE MG/DL
HGB UR QL STRIP: ABNORMAL
KETONES UR STRIP-MCNC: NEGATIVE MG/DL
LEUKOCYTE ESTERASE UR QL STRIP: ABNORMAL
NITRATE UR QL: NEGATIVE
NON-SQ EPI CELLS #/AREA URNS LPF: ABNORMAL /LPF
PH UR STRIP: 7 PH (ref 5–7)
RBC #/AREA URNS AUTO: ABNORMAL /HPF
SOURCE: ABNORMAL
SP GR UR STRIP: 1.02 (ref 1–1.03)
UROBILINOGEN UR STRIP-ACNC: 0.2 EU/DL (ref 0.2–1)
WBC #/AREA URNS AUTO: ABNORMAL /HPF

## 2021-01-05 PROCEDURE — 87088 URINE BACTERIA CULTURE: CPT | Performed by: NURSE PRACTITIONER

## 2021-01-05 PROCEDURE — 81001 URINALYSIS AUTO W/SCOPE: CPT | Performed by: NURSE PRACTITIONER

## 2021-01-05 PROCEDURE — 87186 SC STD MICRODIL/AGAR DIL: CPT | Performed by: NURSE PRACTITIONER

## 2021-01-05 PROCEDURE — 87086 URINE CULTURE/COLONY COUNT: CPT | Performed by: NURSE PRACTITIONER

## 2021-01-05 PROCEDURE — 99213 OFFICE O/P EST LOW 20 MIN: CPT | Mod: TEL | Performed by: NURSE PRACTITIONER

## 2021-01-05 RX ORDER — SULFAMETHOXAZOLE/TRIMETHOPRIM 800-160 MG
1 TABLET ORAL 2 TIMES DAILY
Qty: 6 TABLET | Refills: 0 | Status: SHIPPED | OUTPATIENT
Start: 2021-01-05 | End: 2021-01-08

## 2021-01-05 NOTE — TELEPHONE ENCOUNTER
Patient c/o symptoms of UTI - increased urgency and frequency. Urine is more cloudy  Not painful to urinate, some burning  No low back pain  No blood in urine  Denies fever or chills  Onset of symptoms - 2-3 days ago    Patient requesting orders for UA/UC      Celeste MURRELLN, RN, CPN

## 2021-01-05 NOTE — PROGRESS NOTES
Debbie Baker is a 45 year old female who is being evaluated via a billable telephone visit.      What phone number would you like to be contacted at? [phone]  How would you like to obtain your AVS? Opal  Assessment & Plan     Urinary frequency  Patient will come to lab today to leave urine analysis to evaluate for UTI.  - UA with Microscopic reflex to Culture; Future                             Return in about 8 weeks (around 3/2/2021) for Physical Exam.    Marianela Lee NP  [facility]     Debbie Baker is a 45 year old who presents to clinic today for the following health issues     HPI       Genitourinary - Female  Onset/Duration: 3 days   Description:   Painful urination (Dysuria): YES- burning mild 1/10           Frequency: YES  Blood in urine (Hematuria): no  Delay in urine (Hesitency): no  Intensity: moderate  Progression of Symptoms:  same and constant  Accompanying Signs & Symptoms:  Fever/chills: no  Flank pain: no  Nausea and vomiting: no  Vaginal symptoms: none  Abdominal/Pelvic Pain: no  History:   History of frequent UTI s: YES  History of kidney stones: no  Sexually Active: no  Possibility of pregnancy: No  Precipitating or alleviating factors: drinking less  Therapies tried and outcome: recently she has Increased fluid intake for New Years resolution (has a water bottle that will tell her to drink)  Since making this change she has had increased frequency of urine and when she urinates she does not produce as much urine as she would have expected.    She is on Detrol 1 mg twice daily for urge incontinence.  She tried to wean to Detrol 1 mg daily but this did not work well so she went back up to twice daily.    Review of Systems   Constitutional, HEENT, cardiovascular, pulmonary, gi and gu systems are negative, except as otherwise noted.      Objective           Vitals:  No vitals were obtained today due to virtual visit.    Physical Exam   healthy,  alert and no distress  PSYCH: Alert and oriented times 3; coherent speech, normal   rate and volume, able to articulate logical thoughts, able   to abstract reason, no tangential thoughts, no hallucinations   or delusions  Her affect is normal  RESP: No cough, no audible wheezing, able to talk in full sentences  Remainder of exam unable to be completed due to telephone visits              Phone call duration: 7 minutes with review of chart, review of patient concerns and review of ongoing plans.    Telephone visit (rather than a office visit) done today due to COVID-19 pandemic.

## 2021-01-05 NOTE — TELEPHONE ENCOUNTER
Spoke with patient.  Phone visit scheduled today with Marianela Lee at 1 pm    Leland Kelley RN, BSN, PHN  [facility]

## 2021-01-06 LAB
BACTERIA SPEC CULT: ABNORMAL
Lab: ABNORMAL
SPECIMEN SOURCE: ABNORMAL

## 2021-01-07 RX ORDER — BUPROPION HYDROCHLORIDE 200 MG/1
200 TABLET, EXTENDED RELEASE ORAL 2 TIMES DAILY
Qty: 180 TABLET | Refills: 0 | Status: SHIPPED | OUTPATIENT
Start: 2021-01-07 | End: 2021-03-25

## 2021-01-07 RX ORDER — TOLTERODINE TARTRATE 1 MG/1
1 TABLET, EXTENDED RELEASE ORAL 2 TIMES DAILY
Qty: 180 TABLET | Refills: 0 | Status: SHIPPED | OUTPATIENT
Start: 2021-01-07 | End: 2021-03-25

## 2021-01-07 NOTE — TELEPHONE ENCOUNTER
RN sent Rx to mail order pharmacy as requested, will not transfer Rx on file.     Kelly Mckinney RN, BSN, PHN  [facility]: [place]

## 2021-03-25 DIAGNOSIS — F33.8 SEASONAL AFFECTIVE DISORDER (H): ICD-10-CM

## 2021-03-25 DIAGNOSIS — F32.A DEPRESSIVE DISORDER: ICD-10-CM

## 2021-03-25 DIAGNOSIS — N39.41 URGE INCONTINENCE OF URINE: ICD-10-CM

## 2021-03-25 RX ORDER — TOLTERODINE TARTRATE 1 MG/1
TABLET, EXTENDED RELEASE ORAL
Qty: 180 TABLET | Refills: 0 | Status: SHIPPED | OUTPATIENT
Start: 2021-03-25 | End: 2021-06-21

## 2021-03-25 RX ORDER — BUPROPION HYDROCHLORIDE 200 MG/1
200 TABLET, EXTENDED RELEASE ORAL 2 TIMES DAILY
Qty: 180 TABLET | Refills: 0 | Status: SHIPPED | OUTPATIENT
Start: 2021-03-25 | End: 2021-06-21

## 2021-03-25 NOTE — TELEPHONE ENCOUNTER
Routing refill request to provider for review/approval because:  PHQ-9 score:    PHQ 12/4/2020   PHQ-9 Total Score 12   Q9: Thoughts of better off dead/self-harm past 2 weeks Not at all               Kelly Mckinney RN, BSN, PHN  [facility]: [place]

## 2021-03-25 NOTE — TELEPHONE ENCOUNTER
Detrol: Prescription approved per [facility] Refill Protocol.    Kelly Mckinney, RN, BSN, PHN  [facility]: [place]

## 2021-06-18 DIAGNOSIS — F33.8 SEASONAL AFFECTIVE DISORDER (H): ICD-10-CM

## 2021-06-18 DIAGNOSIS — N39.41 URGE INCONTINENCE OF URINE: ICD-10-CM

## 2021-06-18 DIAGNOSIS — F32.A DEPRESSIVE DISORDER: ICD-10-CM

## 2021-06-21 RX ORDER — BUPROPION HYDROCHLORIDE 200 MG/1
TABLET, EXTENDED RELEASE ORAL
Qty: 180 TABLET | Refills: 0 | Status: SHIPPED | OUTPATIENT
Start: 2021-06-21 | End: 2021-07-06

## 2021-06-21 RX ORDER — TOLTERODINE TARTRATE 1 MG/1
TABLET, EXTENDED RELEASE ORAL
Qty: 180 TABLET | Refills: 0 | Status: SHIPPED | OUTPATIENT
Start: 2021-06-21 | End: 2021-09-21

## 2021-06-21 NOTE — TELEPHONE ENCOUNTER
Routing refill request to provider for review/approval because:  PHQ-9 score less than 5 in past 6 months    [facility] Follow-up to PHQ 9/30/2020 10/26/2020 12/4/2020   PHQ-9 9. Suicide Ideation past 2 weeks Not at all Not at all Not at all     F/u scheduled 7/6/21    Alaina Rivera RN

## 2021-07-06 ENCOUNTER — OFFICE VISIT (OUTPATIENT)
Dept: FAMILY MEDICINE | Facility: CLINIC | Age: 46
End: 2021-07-06
Payer: COMMERCIAL

## 2021-07-06 VITALS
WEIGHT: 136.8 LBS | BODY MASS INDEX: 25.02 KG/M2 | OXYGEN SATURATION: 99 % | DIASTOLIC BLOOD PRESSURE: 80 MMHG | SYSTOLIC BLOOD PRESSURE: 122 MMHG | TEMPERATURE: 98.1 F | HEART RATE: 70 BPM

## 2021-07-06 DIAGNOSIS — F33.8 SEASONAL AFFECTIVE DISORDER (H): ICD-10-CM

## 2021-07-06 DIAGNOSIS — Z15.01 BRCA2 POSITIVE: ICD-10-CM

## 2021-07-06 DIAGNOSIS — Z90.722 S/P BILATERAL OOPHORECTOMY: ICD-10-CM

## 2021-07-06 DIAGNOSIS — F32.A DEPRESSIVE DISORDER: ICD-10-CM

## 2021-07-06 DIAGNOSIS — Z15.09 BRCA2 POSITIVE: ICD-10-CM

## 2021-07-06 DIAGNOSIS — N95.2 VAGINAL ATROPHY: Primary | ICD-10-CM

## 2021-07-06 DIAGNOSIS — N39.0 RECURRENT UTI: ICD-10-CM

## 2021-07-06 PROCEDURE — 99214 OFFICE O/P EST MOD 30 MIN: CPT | Performed by: FAMILY MEDICINE

## 2021-07-06 RX ORDER — ESTRADIOL 0.1 MG/G
2 CREAM VAGINAL
Qty: 48 G | Refills: 1 | Status: SHIPPED | OUTPATIENT
Start: 2021-07-08 | End: 2021-09-29

## 2021-07-06 RX ORDER — BUPROPION HYDROCHLORIDE 200 MG/1
200 TABLET, EXTENDED RELEASE ORAL 2 TIMES DAILY
Qty: 180 TABLET | Refills: 1 | Status: SHIPPED | OUTPATIENT
Start: 2021-07-06 | End: 2022-02-22

## 2021-07-06 ASSESSMENT — PATIENT HEALTH QUESTIONNAIRE - PHQ9: SUM OF ALL RESPONSES TO PHQ QUESTIONS 1-9: 3

## 2021-07-06 NOTE — Clinical Note
Ajith Haider,  Saw this patient today for recurrent UTIs and vaginal dryness.  Started her on vaginal estrogen.  Looking at your notes from her last visit it appears that you're ok with her using vaginal estrogen, but just want to double check given her history.  Let me know if you have concerns about starting this medication.  Thanks!  Zelda Arambula, DO

## 2021-07-06 NOTE — PROGRESS NOTES
Assessment & Plan     Vaginal atrophy  Recurrent UTI  S/P bilateral oophorectomy  BRCA2 positive  - Suspect that vaginal atrophy from her oophorectomy is causing her recurrent UTIs and vaginal dryness  - Start Estrace.  Reviewed notes from oncology and it appears they are ok with HRT, but did send a message to her oncologist to make sure   - estradiol (ESTRACE) 0.1 MG/GM vaginal cream; Place 2 g vaginally twice a week    Depressive disorder  Seasonal affective disorder (H)  - Stable, CPM   - buPROPion (WELLBUTRIN SR) 200 MG 12 hr tablet; Take 1 tablet (200 mg) by mouth 2 times daily    Return in about 3 months (around 10/6/2021).    Zelda Arambula, DO  [facility]    ============================================================================    Subjective   Debbie is a 45 year old who presents for the following health issues:    HPI     Patient would like to discuss multiple UTIs that she's been having lately.  Diagnosed with a UTI via virtual visit on January 2021, 2/25/21 and 6/2/21.  Has a history of urge incontinence and started her on Detrol back in December which she feels is helpful.  She is not having UTI symptoms right now.  Is having some mild vaginal itching with no discharge.  Was in a wet swimsuit for a few hours a couple days ago.  She had a preventive bilateral oophorectomy because of BRCA positive status and does note vaginal dryness.       Depression Followup    How are you doing with your depression since your last visit? Improved    Are you having other symptoms that might be associated with depression? No    Have you had a significant life event?  No     Are you feeling anxious or having panic attacks?   No    Do you have any concerns with your use of alcohol or other drugs? No    Social History     Tobacco Use     Smoking status: Never Smoker     Smokeless tobacco: Never Used   Substance Use Topics     Alcohol use: Yes     Alcohol/week: 0.0 standard drinks      Comment: 2 drink/week     Drug use: No     PHQ 10/26/2020 12/4/2020 7/6/2021   PHQ-9 Total Score 7 12 3   Q9: Thoughts of better off dead/self-harm past 2 weeks Not at all Not at all Not at all     JENA-7 SCORE 10/26/2020   Total Score 9     Last PHQ-9 7/6/2021   1.  Little interest or pleasure in doing things 0   2.  Feeling down, depressed, or hopeless 0   3.  Trouble falling or staying asleep, or sleeping too much 3   4.  Feeling tired or having little energy 0   5.  Poor appetite or overeating 0   6.  Feeling bad about yourself 0   7.  Trouble concentrating 0   8.  Moving slowly or restless 0   Q9: Thoughts of better off dead/self-harm past 2 weeks 0   PHQ-9 Total Score 3   Difficulty at work, home, or with people Somewhat difficult     JENA-7  10/26/2020   1. Feeling nervous, anxious, or on edge 0   2. Not being able to stop or control worrying 1   3. Worrying too much about different things 2   4. Trouble relaxing 1   5. Being so restless that it is hard to sit still 1   6. Becoming easily annoyed or irritable 3   7. Feeling afraid, as if something awful might happen 1   JENA-7 Total Score 9   If you checked any problems, how difficult have they made it for you to do your work, take care of things at home, or get along with other people? Somewhat difficult       Suicide Assessment Five-step Evaluation and Treatment (SAFE-T)      How many servings of fruits and vegetables do you eat daily?  3-4    On average, how many sweetened beverages do you drink each day (Examples: soda, juice, sweet tea, etc.  Do NOT count diet or artificially sweetened beverages)?   0    How many days per week do you exercise enough to make your heart beat faster? 5-6    How many minutes a day do you exercise enough to make your heart beat faster? 60 or more    How many days per week do you miss taking your medication? 0      Review of Systems   Constitutional, HEENT, cardiovascular, pulmonary, gi and gu systems are negative, except as  otherwise noted.      Objective    /80 (BP Location: Right arm, Patient Position: Chair, Cuff Size: Adult Regular)   Pulse 70   Temp 98.1  F (36.7  C) (Oral)   Wt 62.1 kg (136 lb 12.8 oz)   LMP 08/24/2020 (Approximate)   SpO2 99%   BMI 25.02 kg/m    Body mass index is 25.02 kg/m .  Physical Exam   GENERAL: healthy, alert and no distress  PSYCH: mentation appears normal, affect normal/bright

## 2021-08-27 ENCOUNTER — ANCILLARY PROCEDURE (OUTPATIENT)
Dept: MAMMOGRAPHY | Facility: CLINIC | Age: 46
End: 2021-08-27
Attending: CLINICAL NURSE SPECIALIST
Payer: COMMERCIAL

## 2021-08-27 ENCOUNTER — ONCOLOGY VISIT (OUTPATIENT)
Dept: ONCOLOGY | Facility: CLINIC | Age: 46
End: 2021-08-27
Attending: CLINICAL NURSE SPECIALIST
Payer: COMMERCIAL

## 2021-08-27 VITALS
BODY MASS INDEX: 26.31 KG/M2 | OXYGEN SATURATION: 98 % | HEART RATE: 70 BPM | TEMPERATURE: 98 F | DIASTOLIC BLOOD PRESSURE: 79 MMHG | WEIGHT: 143 LBS | SYSTOLIC BLOOD PRESSURE: 120 MMHG | RESPIRATION RATE: 16 BRPM | HEIGHT: 62 IN

## 2021-08-27 DIAGNOSIS — R92.30 DENSE BREAST: ICD-10-CM

## 2021-08-27 DIAGNOSIS — Z12.39 BREAST CANCER SCREENING, HIGH RISK PATIENT: ICD-10-CM

## 2021-08-27 DIAGNOSIS — Z15.02 BRCA2 GENE MUTATION POSITIVE IN FEMALE: Primary | ICD-10-CM

## 2021-08-27 DIAGNOSIS — Z15.01 BRCA2 GENE MUTATION POSITIVE IN FEMALE: Primary | ICD-10-CM

## 2021-08-27 DIAGNOSIS — Z15.09 BRCA2 POSITIVE: ICD-10-CM

## 2021-08-27 DIAGNOSIS — Z15.01 BRCA2 POSITIVE: ICD-10-CM

## 2021-08-27 DIAGNOSIS — Z15.09 BRCA2 GENE MUTATION POSITIVE IN FEMALE: Primary | ICD-10-CM

## 2021-08-27 PROCEDURE — 77067 SCR MAMMO BI INCL CAD: CPT | Performed by: RADIOLOGY

## 2021-08-27 PROCEDURE — 77063 BREAST TOMOSYNTHESIS BI: CPT | Performed by: RADIOLOGY

## 2021-08-27 PROCEDURE — 99215 OFFICE O/P EST HI 40 MIN: CPT | Performed by: CLINICAL NURSE SPECIALIST

## 2021-08-27 PROCEDURE — G0463 HOSPITAL OUTPT CLINIC VISIT: HCPCS

## 2021-08-27 ASSESSMENT — PAIN SCALES - GENERAL: PAINLEVEL: NO PAIN (0)

## 2021-08-27 ASSESSMENT — MIFFLIN-ST. JEOR: SCORE: 1242.02

## 2021-08-27 NOTE — NURSING NOTE
"Oncology Rooming Note    August 27, 2021 8:42 AM   Debbie Baker is a 45 year old female who presents for:    Chief Complaint   Patient presents with     Oncology Clinic Visit     BRCA2 gene mutation positive in female      Initial Vitals: /79   Pulse 70   Temp 98  F (36.7  C)   Resp 16   Ht 1.575 m (5' 2.01\")   Wt 64.9 kg (143 lb)   LMP 08/24/2020 (Approximate)   SpO2 98%   BMI 26.15 kg/m   Estimated body mass index is 26.15 kg/m  as calculated from the following:    Height as of this encounter: 1.575 m (5' 2.01\").    Weight as of this encounter: 64.9 kg (143 lb). Body surface area is 1.69 meters squared.  No Pain (0) Comment: Data Unavailable   Patient's last menstrual period was 08/24/2020 (approximate).  Allergies reviewed: Yes  Medications reviewed: Yes    Medications: Medication refills not needed today.  Pharmacy name entered into [facility]:    [id number] - [place], [place] - [address] AT [facility] & [address] [place] [address] [facility] - [place], [place] - [address] AT PORTAL TO REGISTERED [facility] SITES    Clinical concerns: Has medication question        London Clemons MA            "

## 2021-08-27 NOTE — PATIENT INSTRUCTIONS
Individualized Surveillance Plan for women  Hereditary Breast and/or Ovarian Cancer Syndrome   Per NCCN Guidelines Version 2.2021   Recommended screening Test or procedure Last done Next Scheduled    Breast self awareness starting at age 18. Women should be familiar with their breasts and promptly report changes to their care provider. Periodic, consistent self exam may facilitate breast self awareness. Premenopausal women may find self exams to be most informative when performed at the end of menses.     8/27/2021 August 2022   Breast screening, starting at age 25 Clinical breast exams every 6 -12 months 8/27/2021 August 2022   Breast screening   Age 25-29 Annual breast MRI screening with contrast (or mammogram if MRI is unavailable) or individualized based on family history if a breast cancer diagnosis before age 30 is present.       Breast MRI is performed preferably on day 7-15 of menstrual cycle for premenopausal women.     See below     See below   Breast screening   Age >30-75 years     Annual mammogram (consider tomosynthesis mammogram) and annual screening MRI.     Breast MRI is performed preferably on day 7-15 of menstrual cycle for premenopausal women.    Age>75 years, management should be considered on an individual basis. 4/12/2019- Screening tomosynthesis mammogram, BiRads1    10/24/2019- Breast MRI, BiRads4 for mass in right breast    10/31/2019- US core needle biopsy, right breast, Pathology: fibroadenoma    11/4/2020- Breast MRI, BiRads2 Mammogram after appt today    Next breast MRI February 2022    Next exam: August 2022 followed by mammogram   Ovarian cancer screening, starting at age 30-35 Consider transvaginal ultrasound and  tests.   NA     NA   Pancreatic Cancer Screening beginning at age 50 or 10 years prior to the earliest pancreatic cancer on the BRCA-side of the family  In families with exocrine pancreatic cancer in a first or second degree relative     Consider Annual MRI/MRCP  and/or Endoscopic Ultrasound   Grandfather with pancreatic cancer at 87; likely not BRCA associated   Review at subsequent visits   Recommendation: risk-reducing salpingo-oophorectomy(RRSO), typically between 35 and 40 years old and  upon completion of child bearing.     Because ovarian cancer onset in patients with BRCA2 mutations is on average of 8-10 years later than in patients with BRCA1 mutations, it is reasonable to delay RRSO until 40-45 years in patients with BRCA2 mutations  unless age at diagnosis in the family warrants earlier age for consideration for prophylactic surgery.    Limited data suggest that there may be a slightly increased risk of serous uterine cancer among women with BRCA1+ pathogenic variants. The provider and the patient should discuss the risks and benefits of a concurrent hysterectomy at the time of RRSO for women with a BRCA1+ pathogenic variant /like pathogenic variant prior to surgery.     Salpingectomy alone is not the standard of care for risk reduction although clinical trials are ongoing.     Discuss option of risk-reducing mastectomy.    Consider risks and benefits of risk reducing agents, such as tamoxifen and raloxifene for breast and ovarian cancer.    Consider a full body skin and eye exam for melanoma screening for both BRCA1+ and BRCA2+.     NOTE: Women with BRCA mutation who are treated for breast cancer should have screening of the remaining breast tissue with annual mammography and breast MRI.

## 2021-08-27 NOTE — LETTER
2021         RE: Debbie Baker  [address]  [facility] [address]        Dear Colleague,    Thank you for referring your patient, Debbie Baker, to the [facility]. Please see a copy of my visit note below.    Oncology Risk Management Consultation:  Date on this visit: 2021    Debbie Baker  returns to the Cancer Risk Management Program today at the [facility] for follow up.  She requires screening and surveillance to minimize her risk of cancer secondary to a deleterious BRCA2 mutation.    She is considered to be at high risk for hereditary breast and ovarian cancer.    Primary Physician: Zelda Arambula     History Of Present Illness:  Ms. Baker is a very pleasant, healthy 45 year old female who presents with BRCA2+ associated Hereditary Breast and Ovarian Cancer Syndrome.      Genetic testin2016 - POSITIVE for a BRCA2 mutation, specifically [id number], found on single site analysis genetic testing through [facility]. The testing was done because of a known genetic mutation in the family.    Pertinent history:  Menarche at age 11  First child at age 30  Hx of breastfeeding x 6-8 months  Breast density: heterogeneously fibroglandular tissue.  9/15/2019- Prophylactic Bilateral salpingectomy and oophorectomy.   Pathology:  Ovaries with corpus lutea   - Benign cortical inclusion cysts   - Fallopian tubes with no significant histologic abnormality   - Negative for malignancy or premalignant lesions  Reports a 15-20 year history of oral contraceptive use and short term use of Nuvaring and Provera.  Her only past history with breast problems was an episode of mastitis when she was breastfeeding her children  History of one breast biopsy - fibroadenoma. No atypia or malignancy.     Screening history:  2014, Screening, mammogram, BiRads1.   3/11/2016 - Screening mammogram, BiRads1  2016 - Breast MRI, BiRads1.  3/10/2017 - Screening tomosynthesis  mammogram, BiRads1.  9/15/2017 - Breast MRI, BiRads1.  3/30/2018 - Screening tomosynthesis mammogram, BiRads1.  9/28/2018 - Breast MRI, BiRads1.  4/12/2019- Screening tomosynthesis mammogram, BiRads1  10/24/2019- Breast MRI, BiRads4 for mass in right breast  10/31/2019- US core needle biopsy, right breast, Pathology: fibroadenoma  11/4/2020- Breast MRI, BiRads2    At this visit, she denies new fatigue, breast pain, asymmetry, lumps, masses, thickening, nipple discharge and skin changes in her breasts.    Past Medical/Surgical History:  Past Medical History:   Diagnosis Date     Allergic rhinitis      At high risk for breast cancer 01/12/2016    BRCA2+     At risk for cancer 01/12/2016    at risk for ovarian/fallopian tube cancer, BRCA2+     BRCA2 positive 01/12/2016    [id number]; single site analysis through [facility]     Depression      Depressive disorder 2011    seasonal depression     Mastitis in female 2010     Past Surgical History:   Procedure Laterality Date     CARPAL TUNNEL RELEASE RT/LT Bilateral     2018     CYSTOSCOPY, SLING TRANSVAGINAL N/A 9/15/2020    Procedure: Midurethral sling and cystoscopy;  Surgeon: Pamela Chilel MD;  Location: [facility] OR     GENITOURINARY SURGERY  9/15/2020    bladder mesh     LAPAROSCOPIC SALPINGO-OOPHORECTOMY Bilateral 9/15/2020    Procedure: laparoscopic bilateral salpingo-oophorectomy, pelvic washings;  Surgeon: Celeste Murillo MD;  Location: [facility] OR     SOFT TISSUE SURGERY  12/2018    bilateral carpal tunnel release     wisdom teeth         Allergies:  Allergies as of 08/27/2021 - Reviewed 08/27/2021   Allergen Reaction Noted     Dust mites Other (See Comments) 03/04/2016       Current Medications:  Current Outpatient Medications   Medication Sig Dispense Refill     buPROPion (WELLBUTRIN SR) 200 MG 12 hr tablet Take 1 tablet (200 mg) by mouth 2 times daily 180 tablet 1     Cholecalciferol (VITAMIN D) 2000 UNITS tablet Take 2,000 Units by mouth every morning         estradiol (ESTRACE) 0.1 MG/GM vaginal cream Place 2 g vaginally twice a week 48 g 1     fluticasone (FLONASE) 50 MCG/ACT nasal spray Spray 1 spray into both nostrils as needed        ibuprofen (ADVIL/MOTRIN) 600 MG tablet Take 1 tablet (600 mg) by mouth every 6 hours as needed for moderate pain       Multiple Vitamin (M.V.I. ADULT IV) Take 1 tablet by mouth every morning        tolterodine (DETROL) 1 MG tablet TAKE 1 TABLET TWICE A  tablet 0     tretinoin (RETIN-A) 0.05 % external cream Apply topically nightly as needed 45 g 1        Family History:  Family History   Problem Relation Age of Onset     Hypertension Mother      Depression Mother      Anxiety Disorder Mother      Hypertension Father      Hearing Loss Father      Valvular heart disease Father         s/p mitral valve replacement     Cerebrovascular Disease Father         TIAs     Ovarian Cancer Maternal Grandmother 59         at 61     Other Cancer Maternal Grandmother         ovarian     Pancreatic Cancer Maternal Grandfather 87         at 87     Other Cancer Maternal Grandfather         pancreatic     Substance Abuse Maternal Grandfather         alcohol     Skin Cancer Paternal Grandmother 88     Other Cancer Paternal Grandmother         skin     Bladder Cancer Paternal Grandfather 60         at 80     Cancer Paternal Grandfather         Bladder     Other Cancer Paternal Grandfather         bladder     Breast Cancer Maternal Aunt 51        BRCA2+     No Known Problems Sister      No Known Problems Brother      Other Cancer Other         breast       Social History:  Social History     Socioeconomic History     Marital status:      Spouse name: Tavares     Number of children: 2     Years of education: Not on file     Highest education level: Not on file   Occupational History     Occupation: unemployed   Tobacco Use     Smoking status: Never Smoker     Smokeless tobacco: Never Used   Substance and Sexual Activity      "Alcohol use: Yes     Alcohol/week: 0.0 standard drinks     Comment: 2 drink/week     Drug use: No     Sexual activity: Yes     Partners: Male     Birth control/protection: Post-menopausal     Comment: salpingo oophorectomy   Other Topics Concern      Service Not Asked     Blood Transfusions Not Asked     Caffeine Concern No     Occupational Exposure No     Hobby Hazards No     Sleep Concern No     Stress Concern Yes     Weight Concern No     Special Diet No     Back Care No     Exercise Yes     Comment: works out with , does yoga 3-4x/week     Bike Helmet Not Asked     Seat Belt Yes     Self-Exams Yes     Parent/sibling w/ CABG, MI or angioplasty before 65F 55M? No   Social History Narrative     Not on file     Social Determinants of Health     Financial Resource Strain:      Difficulty of Paying Living Expenses:    Food Insecurity:      Worried About Running Out of Food in the Last Year:      Ran Out of Food in the Last Year:    Transportation Needs:      Lack of Transportation (Medical):      Lack of Transportation (Non-Medical):    Physical Activity:      Days of Exercise per Week:      Minutes of Exercise per Session:    Stress:      Feeling of Stress :    Social Connections:      Frequency of Communication with Friends and Family:      Frequency of Social Gatherings with Friends and Family:      Attends Christianity Services:      Active Member of Clubs or Organizations:      Attends Club or Organization Meetings:      Marital Status:    Intimate Partner Violence: Not At Risk     Fear of Current or Ex-Partner: No     Emotionally Abused: No     Physically Abused: No     Sexually Abused: No       Physical Exam:  /79   Pulse 70   Temp 98  F (36.7  C)   Resp 16   Ht 1.575 m (5' 2.01\")   Wt 64.9 kg (143 lb)   LMP 08/24/2020 (Approximate)   SpO2 98%   BMI 26.15 kg/m      GENERAL APPEARANCE: healthy, alert and no distress     HENT: Mouth without ulcers or lesions     NECK: no " adenopathy, no asymmetry or masses     LYMPHATICS: No cervical, supraclavicular, axillary or inguinal lymphadenopathy     RESP: lungs clear to auscultation - no rales, rhonchi or wheezes     BREAST: L sl>R, normal without masses bilaterally, tenderness or nipple discharge and no palpable axillary masses or adenopathy. Clip not palpated in right breast     CARDIOVASCULAR: regular rate and rhythm, normal S1 S2, no S3 or S4 and no murmur.        SKIN: two small, light brown moles notes on left neck and central throat area.  No suspicious lesions or rashes    Laboratory/Imaging Studies  No results found for any visits on 08/27/21.    ASSESSMENT  We discussed her issues with staying asleep and with recurrent UTIs since she has entered menopause.  She is currently using estradiol vaginal cream at 2 gm twice a week; we discussed that she might try taking one gram of cream three nights per week to see whether that helps.        The problem of recurrent insomnia was discussed. Avoidance of caffeine sources is strongly encouraged. Sleep hygiene issues were reviewed. She does not have an issue falling asleep and it is not known whether her waking at night ( 3 a.m.) is due to her 's snoring or if it is her own internal sleep cycle issue.  The use of sedative hypnotics for temporary relief may be appropriate; she is encouraged to discuss this with her primary care provider.     She has been busy exercising, running a relay race between [facility] and [place] recently; she continues to work as a contractor and continues to run and practice hot yoga for exercise.     Today, her exam is unremarkable and she will proceed with the following plan.     Individualized Surveillance Plan for women  Hereditary Breast and/or Ovarian Cancer Syndrome   Per NCCN Guidelines Version 2.2021   Recommended screening Test or procedure Last done Next Scheduled    Breast self awareness starting at age 18. Women should be familiar with their breasts  and promptly report changes to their care provider. Periodic, consistent self exam may facilitate breast self awareness. Premenopausal women may find self exams to be most informative when performed at the end of menses.     8/27/2021 August 2022   Breast screening, starting at age 25 Clinical breast exams every 6 -12 months 8/27/2021 August 2022   Breast screening   Age 25-29 Annual breast MRI screening with contrast (or mammogram if MRI is unavailable) or individualized based on family history if a breast cancer diagnosis before age 30 is present.       Breast MRI is performed preferably on day 7-15 of menstrual cycle for premenopausal women.     See below     See below   Breast screening   Age >30-75 years     Annual mammogram (consider tomosynthesis mammogram) and annual screening MRI.     Breast MRI is performed preferably on day 7-15 of menstrual cycle for premenopausal women.    Age>75 years, management should be considered on an individual basis. 4/12/2019- Screening tomosynthesis mammogram, BiRads1    10/24/2019- Breast MRI, BiRads4 for mass in right breast    10/31/2019- US core needle biopsy, right breast, Pathology: fibroadenoma    11/4/2020- Breast MRI, BiRads2 Mammogram after appt today    Next breast MRI February 2022    Next exam: August 2022 followed by mammogram   Ovarian cancer screening, starting at age 30-35 Consider transvaginal ultrasound and  tests.   NA     NA   Pancreatic Cancer Screening beginning at age 50 or 10 years prior to the earliest pancreatic cancer on the BRCA-side of the family  In families with exocrine pancreatic cancer in a first or second degree relative     Consider Annual MRI/MRCP and/or Endoscopic Ultrasound   Grandfather with pancreatic cancer at 87; likely not BRCA associated   Review at subsequent visits   Recommendation: risk-reducing salpingo-oophorectomy(RRSO), typically between 35 and 40 years old and  upon completion of child bearing.     Because ovarian  cancer onset in patients with BRCA2 mutations is on average of 8-10 years later than in patients with BRCA1 mutations, it is reasonable to delay RRSO until 40-45 years in patients with BRCA2 mutations  unless age at diagnosis in the family warrants earlier age for consideration for prophylactic surgery.    Limited data suggest that there may be a slightly increased risk of serous uterine cancer among women with BRCA1+ pathogenic variants. The provider and the patient should discuss the risks and benefits of a concurrent hysterectomy at the time of RRSO for women with a BRCA1+ pathogenic variant /like pathogenic variant prior to surgery.     Salpingectomy alone is not the standard of care for risk reduction although clinical trials are ongoing.     Discuss option of risk-reducing mastectomy.    Consider risks and benefits of risk reducing agents, such as tamoxifen and raloxifene for breast and ovarian cancer.    Consider a full body skin and eye exam for melanoma screening for both BRCA1+ and BRCA2+.     NOTE: Women with BRCA mutation who are treated for breast cancer should have screening of the remaining breast tissue with annual mammography and breast MRI.     I spent a total of 48 minutes on the day of the visit. Please see the note for further information on patient assessment and treatment.    HENRIQUE Angela-CNS, OCN, AGN-BC  Clinical Nurse Specialist  Cancer Risk Management Program  [facility] [address]  [place] [address]    phone:  [phone]  Pager: [phone]  fax: [phone]    CC: Zelda Arambula DO

## 2021-08-27 NOTE — PROGRESS NOTES
Oncology Risk Management Consultation:  Date on this visit: 2021    Debbie Baker  returns to the Cancer Risk Management Program today at the [facility] for follow up.  She requires screening and surveillance to minimize her risk of cancer secondary to a deleterious BRCA2 mutation.    She is considered to be at high risk for hereditary breast and ovarian cancer.    Primary Physician: Zelda Arambula     History Of Present Illness:  Ms. Baker is a very pleasant, healthy 45 year old female who presents with BRCA2+ associated Hereditary Breast and Ovarian Cancer Syndrome.      Genetic testin2016 - POSITIVE for a BRCA2 mutation, specifically [id number], found on single site analysis genetic testing through [employer]. The testing was done because of a known genetic mutation in the family.    Pertinent history:  Menarche at age 11  First child at age 30  Hx of breastfeeding x 6-8 months  Breast density: heterogeneously fibroglandular tissue.  9/15/2019- Prophylactic Bilateral salpingectomy and oophorectomy.   Pathology:  Ovaries with corpus lutea   - Benign cortical inclusion cysts   - Fallopian tubes with no significant histologic abnormality   - Negative for malignancy or premalignant lesions  Reports a 15-20 year history of oral contraceptive use and short term use of Nuvaring and Provera.  Her only past history with breast problems was an episode of mastitis when she was breastfeeding her children  History of one breast biopsy - fibroadenoma. No atypia or malignancy.     Screening history:  2014, Screening, mammogram, BiRads1.   3 - Screening mammogram, BiRads1  2016 - Breast MRI, BiRads1.  3/10/2017 - Screening tomosynthesis mammogram, BiRads1.  9/15/2017 - Breast MRI, BiRads1.  3 - Screening tomosynthesis mammogram, BiRads1.  2018 - Breast MRI, BiRads1.  2019- Screening tomosynthesis mammogram, BiRads1  10/24/2019- Breast MRI, BiRads4 for mass in  right breast  10/31/2019- US core needle biopsy, right breast, Pathology: fibroadenoma  11/4/2020- Breast MRI, BiRads2    At this visit, she denies new fatigue, breast pain, asymmetry, lumps, masses, thickening, nipple discharge and skin changes in her breasts.    Past Medical/Surgical History:  Past Medical History:   Diagnosis Date     Allergic rhinitis      At high risk for breast cancer 01/12/2016    BRCA2+     At risk for cancer 01/12/2016    at risk for ovarian/fallopian tube cancer, BRCA2+     BRCA2 positive 01/12/2016    [id number]; single site analysis through [employer]     Depression      Depressive disorder 2011    seasonal depression     Mastitis in female 2010     Past Surgical History:   Procedure Laterality Date     CARPAL TUNNEL RELEASE RT/LT Bilateral     2018     CYSTOSCOPY, SLING TRANSVAGINAL N/A 9/15/2020    Procedure: Midurethral sling and cystoscopy;  Surgeon: Pamela Chilel MD;  Location: [facility] OR     GENITOURINARY SURGERY  9/15/2020    bladder mesh     LAPAROSCOPIC SALPINGO-OOPHORECTOMY Bilateral 9/15/2020    Procedure: laparoscopic bilateral salpingo-oophorectomy, pelvic washings;  Surgeon: Celeste Murillo MD;  Location: [facility] OR     SOFT TISSUE SURGERY  12/2018    bilateral carpal tunnel release     wisdom teeth         Allergies:  Allergies as of 08/27/2021 - Reviewed 08/27/2021   Allergen Reaction Noted     Dust mites Other (See Comments) 03/04/2016       Current Medications:  Current Outpatient Medications   Medication Sig Dispense Refill     buPROPion (WELLBUTRIN SR) 200 MG 12 hr tablet Take 1 tablet (200 mg) by mouth 2 times daily 180 tablet 1     Cholecalciferol (VITAMIN D) 2000 UNITS tablet Take 2,000 Units by mouth every morning        estradiol (ESTRACE) 0.1 MG/GM vaginal cream Place 2 g vaginally twice a week 48 g 1     fluticasone (FLONASE) 50 MCG/ACT nasal spray Spray 1 spray into both nostrils as needed        ibuprofen (ADVIL/MOTRIN) 600 MG tablet Take 1 tablet  (600 mg) by mouth every 6 hours as needed for moderate pain       Multiple Vitamin (M.V.I. ADULT IV) Take 1 tablet by mouth every morning        tolterodine (DETROL) 1 MG tablet TAKE 1 TABLET TWICE A  tablet 0     tretinoin (RETIN-A) 0.05 % external cream Apply topically nightly as needed 45 g 1        Family History:  Family History   Problem Relation Age of Onset     Hypertension Mother      Depression Mother      Anxiety Disorder Mother      Hypertension Father      Hearing Loss Father      Valvular heart disease Father         s/p mitral valve replacement     Cerebrovascular Disease Father         TIAs     Ovarian Cancer Maternal Grandmother 59         at 61     Other Cancer Maternal Grandmother         ovarian     Pancreatic Cancer Maternal Grandfather 87         at 87     Other Cancer Maternal Grandfather         pancreatic     Substance Abuse Maternal Grandfather         alcohol     Skin Cancer Paternal Grandmother 88     Other Cancer Paternal Grandmother         skin     Bladder Cancer Paternal Grandfather 60         at 80     Cancer Paternal Grandfather         Bladder     Other Cancer Paternal Grandfather         bladder     Breast Cancer Maternal Aunt 51        BRCA2+     No Known Problems Sister      No Known Problems Brother      Other Cancer Other         breast       Social History:  Social History     Socioeconomic History     Marital status:      Spouse name: Tavares     Number of children: 2     Years of education: Not on file     Highest education level: Not on file   Occupational History     Occupation: unemployed   Tobacco Use     Smoking status: Never Smoker     Smokeless tobacco: Never Used   Substance and Sexual Activity     Alcohol use: Yes     Alcohol/week: 0.0 standard drinks     Comment: 2 drink/week     Drug use: No     Sexual activity: Yes     Partners: Male     Birth control/protection: Post-menopausal     Comment: salpingo oophorectomy   Other Topics Concern      " Service Not Asked     Blood Transfusions Not Asked     Caffeine Concern No     Occupational Exposure No     Hobby Hazards No     Sleep Concern No     Stress Concern Yes     Weight Concern No     Special Diet No     Back Care No     Exercise Yes     Comment: works out with , does yoga 3-4x/week     Bike Helmet Not Asked     Seat Belt Yes     Self-Exams Yes     Parent/sibling w/ CABG, MI or angioplasty before 65F 55M? No   Social History Narrative     Not on file     Social Determinants of Health     Financial Resource Strain:      Difficulty of Paying Living Expenses:    Food Insecurity:      Worried About Running Out of Food in the Last Year:      Ran Out of Food in the Last Year:    Transportation Needs:      Lack of Transportation (Medical):      Lack of Transportation (Non-Medical):    Physical Activity:      Days of Exercise per Week:      Minutes of Exercise per Session:    Stress:      Feeling of Stress :    Social Connections:      Frequency of Communication with Friends and Family:      Frequency of Social Gatherings with Friends and Family:      Attends Anabaptist Services:      Active Member of Clubs or Organizations:      Attends Club or Organization Meetings:      Marital Status:    Intimate Partner Violence: Not At Risk     Fear of Current or Ex-Partner: No     Emotionally Abused: No     Physically Abused: No     Sexually Abused: No       Physical Exam:  /79   Pulse 70   Temp 98  F (36.7  C)   Resp 16   Ht 1.575 m (5' 2.01\")   Wt 64.9 kg (143 lb)   LMP 08/24/2020 (Approximate)   SpO2 98%   BMI 26.15 kg/m      GENERAL APPEARANCE: healthy, alert and no distress     HENT: Mouth without ulcers or lesions     NECK: no adenopathy, no asymmetry or masses     LYMPHATICS: No cervical, supraclavicular, axillary or inguinal lymphadenopathy     RESP: lungs clear to auscultation - no rales, rhonchi or wheezes     BREAST: L sl>R, normal without masses bilaterally, tenderness or " nipple discharge and no palpable axillary masses or adenopathy. Clip not palpated in right breast     CARDIOVASCULAR: regular rate and rhythm, normal S1 S2, no S3 or S4 and no murmur.        SKIN: two small, light brown moles notes on left neck and central throat area.  No suspicious lesions or rashes    Laboratory/Imaging Studies  No results found for any visits on 08/27/21.    ASSESSMENT  We discussed her issues with staying asleep and with recurrent UTIs since she has entered menopause.  She is currently using estradiol vaginal cream at 2 gm twice a week; we discussed that she might try taking one gram of cream three nights per week to see whether that helps.        The problem of recurrent insomnia was discussed. Avoidance of caffeine sources is strongly encouraged. Sleep hygiene issues were reviewed. She does not have an issue falling asleep and it is not known whether her waking at night ( 3 a.m.) is due to her 's snoring or if it is her own internal sleep cycle issue.  The use of sedative hypnotics for temporary relief may be appropriate; she is encouraged to discuss this with her primary care provider.     She has been busy exercising, running a relay race between [facility] and [place] recently; she continues to work as a contractor and continues to run and practice hot yoga for exercise.     Today, her exam is unremarkable and she will proceed with the following plan.     Individualized Surveillance Plan for women  Hereditary Breast and/or Ovarian Cancer Syndrome   Per NCCN Guidelines Version 2.2021   Recommended screening Test or procedure Last done Next Scheduled    Breast self awareness starting at age 18. Women should be familiar with their breasts and promptly report changes to their care provider. Periodic, consistent self exam may facilitate breast self awareness. Premenopausal women may find self exams to be most informative when performed at the end of menses.     8/27/2021 August 2022    Breast screening, starting at age 25 Clinical breast exams every 6 -12 months 8/27/2021 August 2022   Breast screening   Age 25-29 Annual breast MRI screening with contrast (or mammogram if MRI is unavailable) or individualized based on family history if a breast cancer diagnosis before age 30 is present.       Breast MRI is performed preferably on day 7-15 of menstrual cycle for premenopausal women.     See below     See below   Breast screening   Age >30-75 years     Annual mammogram (consider tomosynthesis mammogram) and annual screening MRI.     Breast MRI is performed preferably on day 7-15 of menstrual cycle for premenopausal women.    Age>75 years, management should be considered on an individual basis. 4/12/2019- Screening tomosynthesis mammogram, BiRads1    10/24/2019- Breast MRI, BiRads4 for mass in right breast    10/31/2019- US core needle biopsy, right breast, Pathology: fibroadenoma    11/4/2020- Breast MRI, BiRads2 Mammogram after appt today    Next breast MRI February 2022    Next exam: August 2022 followed by mammogram   Ovarian cancer screening, starting at age 30-35 Consider transvaginal ultrasound and  tests.   NA     NA   Pancreatic Cancer Screening beginning at age 50 or 10 years prior to the earliest pancreatic cancer on the BRCA-side of the family  In families with exocrine pancreatic cancer in a first or second degree relative     Consider Annual MRI/MRCP and/or Endoscopic Ultrasound   Grandfather with pancreatic cancer at 87; likely not BRCA associated   Review at subsequent visits   Recommendation: risk-reducing salpingo-oophorectomy(RRSO), typically between 35 and 40 years old and  upon completion of child bearing.     Because ovarian cancer onset in patients with BRCA2 mutations is on average of 8-10 years later than in patients with BRCA1 mutations, it is reasonable to delay RRSO until 40-45 years in patients with BRCA2 mutations  unless age at diagnosis in the family warrants  earlier age for consideration for prophylactic surgery.    Limited data suggest that there may be a slightly increased risk of serous uterine cancer among women with BRCA1+ pathogenic variants. The provider and the patient should discuss the risks and benefits of a concurrent hysterectomy at the time of RRSO for women with a BRCA1+ pathogenic variant /like pathogenic variant prior to surgery.     Salpingectomy alone is not the standard of care for risk reduction although clinical trials are ongoing.     Discuss option of risk-reducing mastectomy.    Consider risks and benefits of risk reducing agents, such as tamoxifen and raloxifene for breast and ovarian cancer.    Consider a full body skin and eye exam for melanoma screening for both BRCA1+ and BRCA2+.     NOTE: Women with BRCA mutation who are treated for breast cancer should have screening of the remaining breast tissue with annual mammography and breast MRI.     I spent a total of 48 minutes on the day of the visit. Please see the note for further information on patient assessment and treatment.    HENRIQUE Angela-CNS, OCN, AGN-BC  Clinical Nurse Specialist  Cancer Risk Management Program  [facility] [address]  [place] [address]    phone:  [phone]  Pager: [phone]  fax: [phone]    CC: Zelda Arambula DO

## 2021-09-18 ENCOUNTER — HEALTH MAINTENANCE LETTER (OUTPATIENT)
Age: 46
End: 2021-09-18

## 2021-10-12 ENCOUNTER — VIRTUAL VISIT (OUTPATIENT)
Dept: FAMILY MEDICINE | Facility: CLINIC | Age: 46
End: 2021-10-12
Payer: COMMERCIAL

## 2021-10-12 DIAGNOSIS — Z90.722 S/P BILATERAL OOPHORECTOMY: ICD-10-CM

## 2021-10-12 DIAGNOSIS — N95.2 VAGINAL ATROPHY: ICD-10-CM

## 2021-10-12 DIAGNOSIS — Z15.09 BRCA2 POSITIVE: ICD-10-CM

## 2021-10-12 DIAGNOSIS — Z15.01 BRCA2 POSITIVE: ICD-10-CM

## 2021-10-12 DIAGNOSIS — R68.82 LOW LIBIDO: Primary | ICD-10-CM

## 2021-10-12 DIAGNOSIS — N39.0 RECURRENT UTI: ICD-10-CM

## 2021-10-12 PROCEDURE — 99214 OFFICE O/P EST MOD 30 MIN: CPT | Mod: GT | Performed by: FAMILY MEDICINE

## 2021-10-12 RX ORDER — ESTRADIOL 0.1 MG/G
CREAM VAGINAL
Qty: 42.5 G | Refills: 4 | Status: SHIPPED | OUTPATIENT
Start: 2021-10-13 | End: 2022-07-25

## 2021-10-12 NOTE — PROGRESS NOTES
Debbie is a 46 year old who is being evaluated via a billable video visit.      How would you like to obtain your AVS? MyChart  If the video visit is dropped, the invitation should be resent by: Text to cell phone: [phone]  Will anyone else be joining your video visit? No    ======================================================================    Assessment & Plan     Low libido  - Most likely multifactorial  - Will refer for sex therapy and also increase her Estrace to TID   - MENTAL HEALTH REFERRAL  - Adult; Outpatient Treatment; Individual/Couples/Family/Group Therapy/Health Psychology; [facility] [phone]; We will contact you to schedule the appointment or please call with any questions; Future    Vaginal atrophy  Recurrent UTI  S/P bilateral oophorectomy  BRCA2 positive  - Started Estrace in July 2021.  Has been helpful with these issues  - Increase Estrace to TID   - estradiol (ESTRACE) 0.1 MG/GM vaginal cream; Place vaginally three times a week    Patient has been going to see oncology twice yearly for her high risk cancer screening.  Getting mammograms and MRI breast annually.  She's wondering if I'd be willing to take this over.  I'd be happy to.  She has an MRI ordered already for February 2022, but I'll take over after that.      Return in about 3 months (around 1/12/2022) for if symptoms worsen or fail to improve.    Zelda Arambula DO  [facility]    =======================================================================    Subjective   Debbie is a 46 year old who presents for the following health issues    HPI     Medication Followup of estradiol vaginal cream    Taking Medication as prescribed: yes    Side Effects:  None    Medication Helping Symptoms:  yes     Patient had a preventive BSO September 2020 due to BRCA positive status.  She was having recurrent UTIs and vaginal dryness.  Started Estrace cream in July and she feels it's been helpful.   However, her libido continues to be low.  She's wondering if there's anything else she can do for that.  She has been exercising regularly.      Thoughts on Prevaga for brain support.  Has noticed that her memory isn't as great since having the BSO.      Review of Systems   Constitutional, HEENT, cardiovascular, pulmonary, gi and gu systems are negative, except as otherwise noted.      Objective       Vitals:  No vitals were obtained today due to virtual visit.    Physical Exam   GENERAL: Healthy, alert and no distress  EYES: Eyes grossly normal to inspection.  No discharge or erythema, or obvious scleral/conjunctival abnormalities.  RESP: No audible wheeze, cough, or visible cyanosis.  No visible retractions or increased work of breathing.    SKIN: Visible skin clear. No significant rash, abnormal pigmentation or lesions.  NEURO: Cranial nerves grossly intact.  Mentation and speech appropriate for age.  PSYCH: Mentation appears normal, affect normal/bright, judgement and insight intact, normal speech and appearance well-groomed.          Video-Visit Details    Type of service:  Video Visit    Video Start Time: 12:42 PM  Video End Time: 12:59 PM     Originating Location (pt. Location): Home    Distant Location (provider location):  [facility]     Platform used for Video Visit: [employer]

## 2022-01-08 ENCOUNTER — HEALTH MAINTENANCE LETTER (OUTPATIENT)
Age: 47
End: 2022-01-08

## 2022-02-21 ASSESSMENT — ANXIETY QUESTIONNAIRES
7. FEELING AFRAID AS IF SOMETHING AWFUL MIGHT HAPPEN: NOT AT ALL
GAD7 TOTAL SCORE: 3
6. BECOMING EASILY ANNOYED OR IRRITABLE: NOT AT ALL
7. FEELING AFRAID AS IF SOMETHING AWFUL MIGHT HAPPEN: NOT AT ALL
4. TROUBLE RELAXING: SEVERAL DAYS
3. WORRYING TOO MUCH ABOUT DIFFERENT THINGS: SEVERAL DAYS
GAD7 TOTAL SCORE: 3
2. NOT BEING ABLE TO STOP OR CONTROL WORRYING: NOT AT ALL
GAD7 TOTAL SCORE: 3
1. FEELING NERVOUS, ANXIOUS, OR ON EDGE: SEVERAL DAYS
5. BEING SO RESTLESS THAT IT IS HARD TO SIT STILL: NOT AT ALL

## 2022-02-22 ENCOUNTER — VIRTUAL VISIT (OUTPATIENT)
Dept: FAMILY MEDICINE | Facility: CLINIC | Age: 47
End: 2022-02-22
Payer: COMMERCIAL

## 2022-02-22 DIAGNOSIS — F41.8 SITUATIONAL ANXIETY: ICD-10-CM

## 2022-02-22 DIAGNOSIS — Z12.11 SCREEN FOR COLON CANCER: ICD-10-CM

## 2022-02-22 DIAGNOSIS — F33.8 SEASONAL AFFECTIVE DISORDER (H): ICD-10-CM

## 2022-02-22 DIAGNOSIS — F32.A DEPRESSIVE DISORDER: Primary | ICD-10-CM

## 2022-02-22 PROCEDURE — 99214 OFFICE O/P EST MOD 30 MIN: CPT | Mod: GT | Performed by: FAMILY MEDICINE

## 2022-02-22 RX ORDER — PROPRANOLOL HYDROCHLORIDE 10 MG/1
10 TABLET ORAL 2 TIMES DAILY PRN
Qty: 60 TABLET | Refills: 1 | Status: SHIPPED | OUTPATIENT
Start: 2022-02-22 | End: 2022-04-20

## 2022-02-22 RX ORDER — BUPROPION HYDROCHLORIDE 200 MG/1
200 TABLET, EXTENDED RELEASE ORAL 2 TIMES DAILY
Qty: 180 TABLET | Refills: 3 | Status: SHIPPED | OUTPATIENT
Start: 2022-02-22 | End: 2022-03-21

## 2022-02-22 ASSESSMENT — PATIENT HEALTH QUESTIONNAIRE - PHQ9
10. IF YOU CHECKED OFF ANY PROBLEMS, HOW DIFFICULT HAVE THESE PROBLEMS MADE IT FOR YOU TO DO YOUR WORK, TAKE CARE OF THINGS AT HOME, OR GET ALONG WITH OTHER PEOPLE: NOT DIFFICULT AT ALL
SUM OF ALL RESPONSES TO PHQ QUESTIONS 1-9: 2
SUM OF ALL RESPONSES TO PHQ QUESTIONS 1-9: 2

## 2022-02-22 ASSESSMENT — ANXIETY QUESTIONNAIRES: GAD7 TOTAL SCORE: 3

## 2022-02-22 NOTE — PROGRESS NOTES
Debbie is a 46 year old who is being evaluated via a billable video visit.      How would you like to obtain your AVS? MyChart  If the video visit is dropped, the invitation should be resent by: Text to cell phone: [phone]  Will anyone else be joining your video visit? No    =======================================================================================    Assessment & Plan     Depressive disorder  Seasonal affective disorder (H)  - Stable, continue current dose of Wellbutrin   - buPROPion (WELLBUTRIN SR) 200 MG 12 hr tablet; Take 1 tablet (200 mg) by mouth 2 times daily    Situational anxiety  - Patient requesting a medication to jody PRN for presentations  - Trial of Propranolol sent   - propranolol (INDERAL) 10 MG tablet; Take 1 tablet (10 mg) by mouth 2 times daily as needed (presentations)    Screen for colon cancer  - COLOGUARD([employer] SCIENCES)    Return in about 6 months (around 8/22/2022).    Zelda Arambula, [facility]    ==============================================================================    Subjective   Debbie is a 46 year old who presents for the following health issues     History of Present Illness       Mental Health Follow-up:  Patient presents to follow-up on Anxiety.    Patient's anxiety since last visit has been:  Medium  The patient is not having other symptoms associated with anxiety.  Any significant life events: job concerns  Patient is not feeling anxious or having panic attacks.  Patient has no concerns about alcohol or drug use.     Social History  Tobacco Use    Smoking status: Never Smoker    Smokeless tobacco: Never Used  Alcohol use: Yes    Alcohol/week: 0.0 standard drinks    Comment: 2 drink/week  Drug use: No      Today's PHQ-9         PHQ-9 Total Score:     (P) 2   PHQ-9 Q9 Thoughts of better off dead/self-harm past 2 weeks :   (P) Not at all   Thoughts of suicide or self harm:      Self-harm Plan:        Self-harm Action:         "  Safety concerns for self or others:           She eats 2-3 servings of fruits and vegetables daily.She consumes 1 sweetened beverage(s) daily.She exercises with enough effort to increase her heart rate 30 to 60 minutes per day.  She exercises with enough effort to increase her heart rate 5 days per week.   She is taking medications regularly.     Concern - she has noticed trouble organizing her thoughts when she is in a stressful situation, wondering if there is a medication that can help with this    Patient is currently taking Wellbutrin 200 mg BID.      Review of Systems   Constitutional, HEENT, cardiovascular, pulmonary, gi and gu systems are negative, except as otherwise noted.      Objective    Vitals - Patient Reported  Weight (Patient Reported): 68 kg (150 lb)  Height (Patient Reported): 157.5 cm (5' 2\")  BMI (Based on Pt Reported Ht/Wt): 27.44  Pulse (Patient Reported): 58  Pain Score: No Pain (0)        Physical Exam   GENERAL: Healthy, alert and no distress  EYES: Eyes grossly normal to inspection.  No discharge or erythema, or obvious scleral/conjunctival abnormalities.  RESP: No audible wheeze, cough, or visible cyanosis.  No visible retractions or increased work of breathing.    SKIN: Visible skin clear. No significant rash, abnormal pigmentation or lesions.  NEURO: Cranial nerves grossly intact.  Mentation and speech appropriate for age.  PSYCH: Mentation appears normal, affect normal/bright, judgement and insight intact, normal speech and appearance well-groomed.          Video-Visit Details    Type of service:  Video Visit    Video Start Time: 7:48 AM   Video End Time: 8:00 AM     Originating Location (pt. Location): Home    Distant Location (provider location):  [facility]     Platform used for Video Visit: [email or web address]  Answers for HPI/ROS submitted by the patient on 2/22/2022  If you checked off any problems, how difficult have these problems made it for you to do your " work, take care of things at home, or get along with other people?: Not difficult at all  PHQ9 TOTAL SCORE: 2  JENA 7 TOTAL SCORE: 3  Depression/Anxiety: Anxiety  Anxiety since last: : medium  Other associated symotome: : No  Significant life event: : job concerns  Anxious:: No  Current substance use:: No  How many servings of fruits and vegetables do you eat daily?: 2-3  On average, how many sweetened beverages do you drink each day (Examples: soda, juice, sweet tea, etc.  Do NOT count diet or artificially sweetened beverages)?: 1  How many minutes a day do you exercise enough to make your heart beat faster?: 30 to 60  How many days a week do you exercise enough to make your heart beat faster?: 5  How many days per week do you miss taking your medication?: 0

## 2022-02-23 ASSESSMENT — PATIENT HEALTH QUESTIONNAIRE - PHQ9: SUM OF ALL RESPONSES TO PHQ QUESTIONS 1-9: 2

## 2022-03-01 ENCOUNTER — TELEPHONE (OUTPATIENT)
Dept: FAMILY MEDICINE | Facility: CLINIC | Age: 47
End: 2022-03-01
Payer: COMMERCIAL

## 2022-03-01 LAB — COLOGUARD-ABSTRACT: NEGATIVE

## 2022-03-01 NOTE — TELEPHONE ENCOUNTER
Routing to PCP-     Pt calling in asking if it is okay to take CBD with per propranolol.    Can either call pt back or send response via [employer].    Alaina Rivera RN

## 2022-03-10 NOTE — RESULT ENCOUNTER NOTE
Dear Debbie,     Here are your recent results. Colon cancer screening is negative.      Please let us know if you have any questions or concerns.    Regards,  Zelda Arambula, DO

## 2022-05-16 ENCOUNTER — MYC MEDICAL ADVICE (OUTPATIENT)
Dept: FAMILY MEDICINE | Facility: CLINIC | Age: 47
End: 2022-05-16
Payer: COMMERCIAL

## 2022-05-16 DIAGNOSIS — F32.A DEPRESSIVE DISORDER: ICD-10-CM

## 2022-05-16 DIAGNOSIS — F33.8 SEASONAL AFFECTIVE DISORDER (H): ICD-10-CM

## 2022-05-16 DIAGNOSIS — N39.41 URGE INCONTINENCE OF URINE: ICD-10-CM

## 2022-05-16 NOTE — TELEPHONE ENCOUNTER
Request from [facility] pill pack. Has refills on file with [facility] mail order. Mychart sent to [facility] pharmacy.     Kelly Mckinney, RN, BSN, PHN  [facility]: [place]

## 2022-05-18 RX ORDER — BUPROPION HYDROCHLORIDE 200 MG/1
200 TABLET, EXTENDED RELEASE ORAL 2 TIMES DAILY
Qty: 180 TABLET | Refills: 1 | OUTPATIENT
Start: 2022-05-18

## 2022-05-18 RX ORDER — TOLTERODINE TARTRATE 1 MG/1
1 TABLET, EXTENDED RELEASE ORAL 2 TIMES DAILY
Qty: 180 TABLET | Refills: 0 | OUTPATIENT
Start: 2022-05-18

## 2022-05-18 NOTE — TELEPHONE ENCOUNTER
Patient did not read Mychart. Rx refused as refills remain on file with other pharmacy.     Kelly Mckinney, RN, BSN, PHN  [facility]: [place]

## 2022-05-23 RX ORDER — BUPROPION HYDROCHLORIDE 200 MG/1
200 TABLET, EXTENDED RELEASE ORAL 2 TIMES DAILY
Qty: 180 TABLET | Refills: 0 | Status: SHIPPED | OUTPATIENT
Start: 2022-05-23 | End: 2022-10-10

## 2022-05-23 RX ORDER — TOLTERODINE TARTRATE 1 MG/1
1 TABLET, EXTENDED RELEASE ORAL 2 TIMES DAILY
Qty: 180 TABLET | Refills: 0 | Status: SHIPPED | OUTPATIENT
Start: 2022-05-23 | End: 2022-08-01

## 2022-05-23 NOTE — TELEPHONE ENCOUNTER
Patient needs Rx transferred to [facility] order (will not transfer Rx on file with [facility]). RN resent.     Kelly Mckinney, RN, BSN, PHN  [facility]: [place]

## 2022-07-25 ENCOUNTER — TELEPHONE (OUTPATIENT)
Dept: FAMILY MEDICINE | Facility: CLINIC | Age: 47
End: 2022-07-25

## 2022-07-25 DIAGNOSIS — N39.0 RECURRENT UTI: ICD-10-CM

## 2022-07-25 DIAGNOSIS — Z90.722 S/P BILATERAL OOPHORECTOMY: ICD-10-CM

## 2022-07-25 DIAGNOSIS — N95.2 VAGINAL ATROPHY: ICD-10-CM

## 2022-07-25 RX ORDER — ESTRADIOL 0.1 MG/G
CREAM VAGINAL
Qty: 42.5 G | Refills: 4 | Status: CANCELLED | OUTPATIENT
Start: 2022-07-25

## 2022-07-25 RX ORDER — ESTRADIOL 0.1 MG/G
CREAM VAGINAL
Qty: 42.5 G | Refills: 4 | Status: SHIPPED | OUTPATIENT
Start: 2022-07-25 | End: 2022-07-27

## 2022-07-25 NOTE — TELEPHONE ENCOUNTER
Forms received from [facility]/ Request for a new prescription - Estradiol Cream  for Dr Arambula.  Forms placed in provider 'sign me' folder.  Please fax forms to [phone] after completion.    TORIN Santana  [facility]

## 2022-07-26 NOTE — TELEPHONE ENCOUNTER
[facility] pharmacy is calling in regards to the below script. They are needing the amount of grams of cream that are to be placed vaginally 3 times a week. They have to have an amount?? Please call or send a new script.    TORIN Santana  [facility]

## 2022-07-27 RX ORDER — ESTRADIOL 0.1 MG/G
CREAM VAGINAL
Qty: 42.5 G | Refills: 4 | Status: SHIPPED | OUTPATIENT
Start: 2022-07-27 | End: 2022-10-10

## 2022-08-13 ENCOUNTER — E-VISIT (OUTPATIENT)
Dept: FAMILY MEDICINE | Facility: CLINIC | Age: 47
End: 2022-08-13
Payer: COMMERCIAL

## 2022-08-13 DIAGNOSIS — N94.89 VAGINAL BURNING: Primary | ICD-10-CM

## 2022-08-13 PROCEDURE — 99207 PR NON-BILLABLE SERV PER CHARTING: CPT | Performed by: FAMILY MEDICINE

## 2022-08-16 NOTE — TELEPHONE ENCOUNTER
Spoke with patient.  She states that her vaginal symptoms are improving.  Advise to continue with vaginal rest, warm gentle soaks, and avoid any friction, chemicals, or foreign body until symptoms resolve.  Also, discussed signs of infection.  Patient voiced understanding and was agreeable with plan.

## 2022-09-27 DIAGNOSIS — Z15.01 BRCA2 GENE MUTATION POSITIVE IN FEMALE: Primary | ICD-10-CM

## 2022-09-27 DIAGNOSIS — R92.30 DENSE BREAST: ICD-10-CM

## 2022-09-27 DIAGNOSIS — Z80.3 FAMILY HISTORY OF MALIGNANT NEOPLASM OF BREAST: ICD-10-CM

## 2022-09-27 DIAGNOSIS — Z12.39 BREAST CANCER SCREENING, HIGH RISK PATIENT: ICD-10-CM

## 2022-09-27 DIAGNOSIS — Z15.02 BRCA2 GENE MUTATION POSITIVE IN FEMALE: Primary | ICD-10-CM

## 2022-09-27 DIAGNOSIS — Z15.09 BRCA2 GENE MUTATION POSITIVE IN FEMALE: Primary | ICD-10-CM

## 2022-10-05 ASSESSMENT — ENCOUNTER SYMPTOMS
DIZZINESS: 0
EYE PAIN: 0
HEMATURIA: 0
HEMATOCHEZIA: 0
PARESTHESIAS: 0
SHORTNESS OF BREATH: 0
FEVER: 0
NERVOUS/ANXIOUS: 0
WEAKNESS: 0
CHILLS: 0
CONSTIPATION: 0
SORE THROAT: 0
ABDOMINAL PAIN: 0
FREQUENCY: 1
DIARRHEA: 0
HEARTBURN: 0
BREAST MASS: 0
DYSURIA: 0
HEADACHES: 1
ARTHRALGIAS: 0
JOINT SWELLING: 0
NAUSEA: 0
MYALGIAS: 0
COUGH: 0
PALPITATIONS: 0

## 2022-10-10 ENCOUNTER — OFFICE VISIT (OUTPATIENT)
Dept: FAMILY MEDICINE | Facility: CLINIC | Age: 47
End: 2022-10-10
Payer: COMMERCIAL

## 2022-10-10 VITALS
OXYGEN SATURATION: 100 % | BODY MASS INDEX: 26.46 KG/M2 | SYSTOLIC BLOOD PRESSURE: 114 MMHG | HEART RATE: 70 BPM | HEIGHT: 62 IN | WEIGHT: 143.8 LBS | DIASTOLIC BLOOD PRESSURE: 70 MMHG | RESPIRATION RATE: 18 BRPM | TEMPERATURE: 98.1 F

## 2022-10-10 DIAGNOSIS — Z90.722 S/P BILATERAL OOPHORECTOMY: ICD-10-CM

## 2022-10-10 DIAGNOSIS — N95.2 VAGINAL ATROPHY: ICD-10-CM

## 2022-10-10 DIAGNOSIS — N39.41 URGE INCONTINENCE OF URINE: ICD-10-CM

## 2022-10-10 DIAGNOSIS — F33.8 SEASONAL AFFECTIVE DISORDER (H): ICD-10-CM

## 2022-10-10 DIAGNOSIS — L70.0 ACNE VULGARIS: ICD-10-CM

## 2022-10-10 DIAGNOSIS — F32.A DEPRESSIVE DISORDER: ICD-10-CM

## 2022-10-10 DIAGNOSIS — G47.00 INSOMNIA, UNSPECIFIED TYPE: ICD-10-CM

## 2022-10-10 DIAGNOSIS — Z15.09 BRCA2 GENE MUTATION POSITIVE IN FEMALE: ICD-10-CM

## 2022-10-10 DIAGNOSIS — Z15.01 BRCA2 GENE MUTATION POSITIVE IN FEMALE: ICD-10-CM

## 2022-10-10 DIAGNOSIS — Z00.00 ROUTINE GENERAL MEDICAL EXAMINATION AT A HEALTH CARE FACILITY: Primary | ICD-10-CM

## 2022-10-10 DIAGNOSIS — F41.8 SITUATIONAL ANXIETY: ICD-10-CM

## 2022-10-10 DIAGNOSIS — M79.671 RIGHT FOOT PAIN: ICD-10-CM

## 2022-10-10 DIAGNOSIS — Z15.02 BRCA2 GENE MUTATION POSITIVE IN FEMALE: ICD-10-CM

## 2022-10-10 PROCEDURE — 99396 PREV VISIT EST AGE 40-64: CPT | Mod: 25 | Performed by: FAMILY MEDICINE

## 2022-10-10 PROCEDURE — 99214 OFFICE O/P EST MOD 30 MIN: CPT | Mod: 25 | Performed by: FAMILY MEDICINE

## 2022-10-10 PROCEDURE — 91313 COVID-19,PF,MODERNA BIVALENT: CPT | Performed by: FAMILY MEDICINE

## 2022-10-10 PROCEDURE — 0134A COVID-19,PF,MODERNA BIVALENT: CPT | Performed by: FAMILY MEDICINE

## 2022-10-10 RX ORDER — ESTRADIOL 0.1 MG/G
CREAM VAGINAL
Qty: 42.5 G | Refills: 4 | Status: SHIPPED | OUTPATIENT
Start: 2022-10-10 | End: 2024-08-23

## 2022-10-10 RX ORDER — PROPRANOLOL HYDROCHLORIDE 10 MG/1
TABLET ORAL
Qty: 60 TABLET | Refills: 1 | Status: CANCELLED | OUTPATIENT
Start: 2022-10-10

## 2022-10-10 RX ORDER — TRETINOIN 0.5 MG/G
CREAM TOPICAL
Qty: 45 G | Refills: 3 | Status: SHIPPED | OUTPATIENT
Start: 2022-10-10 | End: 2023-06-08

## 2022-10-10 RX ORDER — BUPROPION HYDROCHLORIDE 200 MG/1
200 TABLET, EXTENDED RELEASE ORAL 2 TIMES DAILY
Qty: 180 TABLET | Refills: 3 | Status: SHIPPED | OUTPATIENT
Start: 2022-10-10 | End: 2023-06-08

## 2022-10-10 RX ORDER — TOLTERODINE TARTRATE 1 MG/1
1 TABLET, EXTENDED RELEASE ORAL 2 TIMES DAILY
Qty: 180 TABLET | Refills: 0 | Status: CANCELLED | OUTPATIENT
Start: 2022-10-10

## 2022-10-10 RX ORDER — TOLTERODINE TARTRATE 2 MG/1
2 TABLET, EXTENDED RELEASE ORAL 2 TIMES DAILY
Qty: 180 TABLET | Refills: 3 | Status: SHIPPED | OUTPATIENT
Start: 2022-10-10 | End: 2023-05-09

## 2022-10-10 RX ORDER — BIOTIN 5 MG
TABLET ORAL
COMMUNITY
Start: 2022-09-01 | End: 2024-03-18

## 2022-10-10 ASSESSMENT — ENCOUNTER SYMPTOMS
HEMATOCHEZIA: 0
DYSURIA: 0
SORE THROAT: 0
FEVER: 0
SHORTNESS OF BREATH: 0
WEAKNESS: 0
MYALGIAS: 0
EYE PAIN: 0
BREAST MASS: 0
FREQUENCY: 1
COUGH: 0
NAUSEA: 0
HEADACHES: 1
DIZZINESS: 0
CHILLS: 0
PARESTHESIAS: 0
CONSTIPATION: 0
HEARTBURN: 0
HEMATURIA: 0
JOINT SWELLING: 0
DIARRHEA: 0
ARTHRALGIAS: 0
NERVOUS/ANXIOUS: 0
PALPITATIONS: 0
ABDOMINAL PAIN: 0

## 2022-10-10 ASSESSMENT — PATIENT HEALTH QUESTIONNAIRE - PHQ9
SUM OF ALL RESPONSES TO PHQ QUESTIONS 1-9: 3
SUM OF ALL RESPONSES TO PHQ QUESTIONS 1-9: 3
10. IF YOU CHECKED OFF ANY PROBLEMS, HOW DIFFICULT HAVE THESE PROBLEMS MADE IT FOR YOU TO DO YOUR WORK, TAKE CARE OF THINGS AT HOME, OR GET ALONG WITH OTHER PEOPLE: SOMEWHAT DIFFICULT

## 2022-10-10 ASSESSMENT — PAIN SCALES - GENERAL: PAINLEVEL: MODERATE PAIN (4)

## 2022-10-10 NOTE — PROGRESS NOTES
SUBJECTIVE:   CC: Debbie is an 47 year old who presents for preventive health visit.     Patient has been advised of split billing requirements and indicates understanding: Yes  Healthy Habits:     Getting at least 3 servings of Calcium per day:  Yes    Bi-annual eye exam:  Yes    Dental care twice a year:  Yes    Sleep apnea or symptoms of sleep apnea:  None    Diet:  Regular (no restrictions)    Frequency of exercise:  2-3 days/week    Duration of exercise:  45-60 minutes    Taking medications regularly:  Yes    Barriers to taking medications:  None    Medication side effects:  None    PHQ-2 Total Score: 0    Additional concerns today:  No    Pain on the ball of right foot 3-4 days. No known injury. She started biking and possibly done it too much too fast and perhaps got inflamed.    Taking Detrol for urge incontinence.  Still having some symptoms.  S/P oophorectomy for BRCA2 gene and using Estrace cream.      She has trouble staying asleep.  Wakes very early in the morning and has trouble falling back to sleep.  Has tried working on sleep hygiene.      Depression Followup    How are you doing with your depression since your last visit? Improved    Are you having other symptoms that might be associated with depression? No    Have you had a significant life event?  No     Are you feeling anxious or having panic attacks?   No    Do you have any concerns with your use of alcohol or other drugs? No     - Currently taking Wellbutrin  mg BID and uses propranolol PRN for presentations    Social History     Tobacco Use     Smoking status: Never     Smokeless tobacco: Never   Vaping Use     Vaping Use: Never used   Substance Use Topics     Alcohol use: Yes     Comment: 2 drink/week     Drug use: No     PHQ 7/6/2021 2/22/2022 10/10/2022   PHQ-9 Total Score 3 2 3   Q9: Thoughts of better off dead/self-harm past 2 weeks Not at all Not at all Not at all     JENA-7 SCORE 10/26/2020 2/21/2022   Total Score - 3 (minimal  anxiety)   Total Score 9 3     Last PHQ-9 10/10/2022   1.  Little interest or pleasure in doing things 0   2.  Feeling down, depressed, or hopeless 0   3.  Trouble falling or staying asleep, or sleeping too much 1   4.  Feeling tired or having little energy 1   5.  Poor appetite or overeating 0   6.  Feeling bad about yourself 0   7.  Trouble concentrating 1   8.  Moving slowly or restless 0   Q9: Thoughts of better off dead/self-harm past 2 weeks 0   PHQ-9 Total Score 3   Difficulty at work, home, or with people -     JENA-7  2/21/2022   1. Feeling nervous, anxious, or on edge 1   2. Not being able to stop or control worrying 0   3. Worrying too much about different things 1   4. Trouble relaxing 1   5. Being so restless that it is hard to sit still 0   6. Becoming easily annoyed or irritable 0   7. Feeling afraid, as if something awful might happen 0   JENA-7 Total Score 3   If you checked any problems, how difficult have they made it for you to do your work, take care of things at home, or get along with other people? -       Today's PHQ-2 Score:   PHQ-2 ( 1999 [employer]) 10/5/2022   Q1: Little interest or pleasure in doing things 0   Q2: Feeling down, depressed or hopeless 0   PHQ-2 Score 0   PHQ-2 Total Score (12-17 Years)- Positive if 3 or more points; Administer PHQ-A if positive -   Q1: Little interest or pleasure in doing things Not at all   Q2: Feeling down, depressed or hopeless Not at all   PHQ-2 Score 0     Abuse: Current or Past (Physical, Sexual or Emotional) - No  Do you feel safe in your environment? Yes    Social History     Tobacco Use     Smoking status: Never     Smokeless tobacco: Never   Substance Use Topics     Alcohol use: Yes     Comment: 2 drink/week       Alcohol Use 10/5/2022   Prescreen: >3 drinks/day or >7 drinks/week? No       Reviewed orders with patient.  Reviewed health maintenance and updated orders accordingly - Yes  Patient Active Problem List   Diagnosis     Family history of  malignant neoplasm of breast     BRCA2 positive     Allergic rhinitis     Seasonal affective disorder (H)     Family history of ovarian cancer     Family history of pancreatic cancer     At high risk for breast cancer     At risk for cancer     Current mild episode of major depressive disorder without prior episode (H)     CTS (carpal tunnel syndrome)     BRCA2 gene mutation positive in female     Acne vulgaris     S/P bilateral oophorectomy     Urge incontinence of urine     Depressive disorder     Vaginal atrophy     Situational anxiety     Insomnia, unspecified type     Past Surgical History:   Procedure Laterality Date     CARPAL TUNNEL RELEASE RT/LT Bilateral          CYSTOSCOPY, SLING TRANSVAGINAL N/A 9/15/2020    Procedure: Midurethral sling and cystoscopy;  Surgeon: Pamela Chilel MD;  Location: [facility] OR     GENITOURINARY SURGERY  9/15/2020    bladder mesh     LAPAROSCOPIC SALPINGO-OOPHORECTOMY Bilateral 9/15/2020    Procedure: laparoscopic bilateral salpingo-oophorectomy, pelvic washings;  Surgeon: Celeste Murillo MD;  Location: [facility] OR     SOFT TISSUE SURGERY  2018    bilateral carpal tunnel release     wisdom teeth         Social History     Tobacco Use     Smoking status: Never     Smokeless tobacco: Never   Substance Use Topics     Alcohol use: Yes     Comment: 2 drink/week     Family History   Problem Relation Age of Onset     Hypertension Mother      Depression Mother      Anxiety Disorder Mother      Hypertension Father      Hearing Loss Father      Valvular heart disease Father         s/p mitral valve replacement     Cerebrovascular Disease Father         TIAs     Ovarian Cancer Maternal Grandmother 59         at 61     Other Cancer Maternal Grandmother         ovarian     Pancreatic Cancer Maternal Grandfather 87         at 87     Other Cancer Maternal Grandfather         pancreatic     Substance Abuse Maternal Grandfather         alcohol     Skin Cancer Paternal Grandmother 88      Other Cancer Paternal Grandmother         skin     Bladder Cancer Paternal Grandfather 60         at 80     Cancer Paternal Grandfather         Bladder     Other Cancer Paternal Grandfather         bladder     Breast Cancer Maternal Aunt 51        BRCA2+     No Known Problems Sister      No Known Problems Brother      Other Cancer Other         breast           Breast Cancer Screening:    FHS-7:   Breast CA Risk Assessment (FHS-7) 2021   Did any of your first-degree relatives have breast or ovarian cancer? No   Did any of your relatives have bilateral breast cancer? Unknown   Did any man in your family have breast cancer? No   Did any woman in your family have breast and ovarian cancer? No   Did any woman in your family have breast cancer before age 50 y? No   Do you have 2 or more relatives with breast and/or ovarian cancer? Yes   Do you have 2 or more relatives with breast and/or bowel cancer? No       Mammogram Screening: Recommended annual mammography  Pertinent mammograms are reviewed under the imaging tab.    History of abnormal Pap smear: NO - age 30-65 PAP every 5 years with negative HPV co-testing recommended       Review of Systems   Constitutional: Negative for chills and fever.   HENT: Negative for congestion, ear pain, hearing loss and sore throat.    Eyes: Negative for pain and visual disturbance.   Respiratory: Negative for cough and shortness of breath.    Cardiovascular: Negative for chest pain, palpitations and peripheral edema.   Gastrointestinal: Negative for abdominal pain, constipation, diarrhea, heartburn, hematochezia and nausea.   Breasts:  Negative for tenderness, breast mass and discharge.   Genitourinary: Positive for frequency and urgency. Negative for dysuria, genital sores, hematuria, pelvic pain, vaginal bleeding and vaginal discharge.   Musculoskeletal: Negative for arthralgias, joint swelling and myalgias.   Skin: Negative for rash.   Neurological: Positive for  "headaches. Negative for dizziness, weakness and paresthesias.   Psychiatric/Behavioral: Positive for mood changes. The patient is not nervous/anxious.      OBJECTIVE:   /70 (BP Location: Right arm, Patient Position: Chair, Cuff Size: Adult Regular)   Pulse 70   Temp 98.1  F (36.7  C) (Oral)   Resp 18   Ht 1.58 m (5' 2.21\")   Wt 65.2 kg (143 lb 12.8 oz)   LMP 08/24/2020 (Approximate)   SpO2 100%   BMI 26.13 kg/m    Physical Exam  GENERAL APPEARANCE: healthy, alert and no distress  EYES: Eyes grossly normal to inspection, PERRL and conjunctivae and sclerae normal  HENT: ear canals and TM's normal, nose and mouth without ulcers or lesions, oropharynx clear and oral mucous membranes moist  NECK: no adenopathy, no asymmetry, masses, or scars and thyroid normal to palpation  RESP: lungs clear to auscultation - no rales, rhonchi or wheezes  BREAST: normal without masses, tenderness or nipple discharge and no palpable axillary masses or adenopathy  CV: regular rates and rhythm, normal S1 S2, no S3 or S4 and no murmur, click or rub  ABDOMEN: soft, nontender, no hepatosplenomegaly and no masses  MS: no musculoskeletal defects are noted and gait is age appropriate without ataxia  SKIN: no suspicious lesions or rashes  NEURO: Normal strength and tone, sensory exam grossly normal, mentation intact and speech normal  PSYCH: mentation appears normal and affect normal/bright    ASSESSMENT/PLAN:       ICD-10-CM    1. Routine general medical examination at a health care facility  Z00.00       2. Urge incontinence of urine  N39.41 tolterodine (DETROL) 2 MG tablet      3. Depressive disorder  F32.A buPROPion (WELLBUTRIN SR) 200 MG 12 hr tablet      4. Seasonal affective disorder (H)  F33.8 buPROPion (WELLBUTRIN SR) 200 MG 12 hr tablet      5. Situational anxiety  F41.8       6. Vaginal atrophy  N95.2 estradiol (ESTRACE) 0.1 MG/GM vaginal cream      7. S/P bilateral oophorectomy  Z90.722 estradiol (ESTRACE) 0.1 MG/GM " "vaginal cream      8. BRCA2 gene mutation positive in female  Z15.01     Z15.02     Z15.09       9. Acne vulgaris  L70.0 tretinoin (RETIN-A) 0.05 % external cream      10. Insomnia, unspecified type  G47.00       11. Right foot pain  M79.671         - Urge incontinence continues to bother her despite Detrol.  Advised increasing the dose.  She's like to avoid pelvic floor therapy for now  - MDD/SAD/situational anxiety: Doing well on current dose of Wellbutrin.  Continue propranolol PRN for presentations  - Vaginal atrophy 2/2 bilateral oophorectomy for BRCA2: Continue Estrace cream   - Acne is well controlled with PRN retinol cream  - Insomnia is likely 2/2 menopause and MDD/stress.  Advised trying an extended release melatonin to help her stay asleep.  She is already working on sleep hygiene  - Right foot pain likely a mild sprain from overuse.  Rest advised     Patient has been advised of split billing requirements and indicates understanding: Yes    COUNSELING:  Reviewed preventive health counseling, as reflected in patient instructions    Estimated body mass index is 26.13 kg/m  as calculated from the following:    Height as of this encounter: 1.58 m (5' 2.21\").    Weight as of this encounter: 65.2 kg (143 lb 12.8 oz).      She reports that she has never smoked. She has never used smokeless tobacco.      Counseling Resources:  ATP IV Guidelines  Pooled Cohorts Equation Calculator  Breast Cancer Risk Calculator  BRCA-Related Cancer Risk Assessment: FHS-7 Tool  FRAX Risk Assessment  ICSI Preventive Guidelines  Dietary Guidelines for Americans, 2010  [employer]'s MyPlate  ASA Prophylaxis  Lung CA Screening    Zelda Arambula DO  [facility]  Answers for HPI/ROS submitted by the patient on 10/10/2022  If you checked off any problems, how difficult have these problems made it for you to do your work, take care of things at home, or get along with other people?: Somewhat difficult  PHQ9 TOTAL " SCORE: 3

## 2022-11-09 ENCOUNTER — ANCILLARY PROCEDURE (OUTPATIENT)
Dept: MRI IMAGING | Facility: CLINIC | Age: 47
End: 2022-11-09
Attending: CLINICAL NURSE SPECIALIST
Payer: COMMERCIAL

## 2022-11-09 DIAGNOSIS — Z80.3 FAMILY HISTORY OF MALIGNANT NEOPLASM OF BREAST: ICD-10-CM

## 2022-11-09 DIAGNOSIS — R92.30 DENSE BREAST: ICD-10-CM

## 2022-11-09 DIAGNOSIS — Z15.09 BRCA2 GENE MUTATION POSITIVE IN FEMALE: ICD-10-CM

## 2022-11-09 DIAGNOSIS — Z15.01 BRCA2 GENE MUTATION POSITIVE IN FEMALE: ICD-10-CM

## 2022-11-09 DIAGNOSIS — Z12.39 BREAST CANCER SCREENING, HIGH RISK PATIENT: ICD-10-CM

## 2022-11-09 DIAGNOSIS — Z15.02 BRCA2 GENE MUTATION POSITIVE IN FEMALE: ICD-10-CM

## 2022-11-09 PROCEDURE — 77049 MRI BREAST C-+ W/CAD BI: CPT | Performed by: RADIOLOGY

## 2022-11-09 PROCEDURE — A9585 GADOBUTROL INJECTION: HCPCS | Performed by: RADIOLOGY

## 2022-11-09 RX ORDER — GADOBUTROL 604.72 MG/ML
7.5 INJECTION INTRAVENOUS ONCE
Status: COMPLETED | OUTPATIENT
Start: 2022-11-09 | End: 2022-11-09

## 2022-11-09 RX ADMIN — GADOBUTROL 6.5 ML: 604.72 INJECTION INTRAVENOUS at 15:20

## 2022-11-20 ENCOUNTER — HEALTH MAINTENANCE LETTER (OUTPATIENT)
Age: 47
End: 2022-11-20

## 2022-12-23 ENCOUNTER — VIRTUAL VISIT (OUTPATIENT)
Dept: FAMILY MEDICINE | Facility: CLINIC | Age: 47
End: 2022-12-23
Payer: COMMERCIAL

## 2022-12-23 DIAGNOSIS — Z20.818 STREP THROAT EXPOSURE: Primary | ICD-10-CM

## 2022-12-23 DIAGNOSIS — J02.9 SORE THROAT: ICD-10-CM

## 2022-12-23 PROCEDURE — 99213 OFFICE O/P EST LOW 20 MIN: CPT | Mod: 95 | Performed by: NURSE PRACTITIONER

## 2022-12-23 RX ORDER — AZITHROMYCIN 250 MG/1
TABLET, FILM COATED ORAL
Qty: 6 TABLET | Refills: 0 | Status: SHIPPED | OUTPATIENT
Start: 2022-12-23 | End: 2022-12-28

## 2022-12-23 ASSESSMENT — ENCOUNTER SYMPTOMS
RHINORRHEA: 1
SORE THROAT: 1

## 2022-12-23 NOTE — PROGRESS NOTES
Debbie is a 47 year old who is being evaluated via a billable telephone visit.      What phone number would you like to be contacted at? [phone]  How would you like to obtain your AVS? Opal  Distant Location (provider location):  Off-site    Assessment & Plan     Strep throat exposure  Suspect strep. Unclear whether she had it first and passed onto son or if he had it first. She continues to have a mild sore throat. Given positive family exposure and symptoms and that access over the weekend will be hard due to holiday, will go ahead and treat patient. She would like brando. Informed her that she could wait and watch to see if her symptoms continue to improve or she could go ahead and take. Discussed contagious time frame. Side effects, risks and benefits of medication were discussed with patient. Discussed how and when to take medication. Recommended taking a probiotic and/or eating yogurt every day while on antibiotics and for ~1 week after stopping antibiotics to prevent GI upset. Discussed OTC recommendations for symptom control. Rest, hydration, humidification.    - azithromycin (ZITHROMAX) 250 MG tablet; Take 2 tablets (500 mg) by mouth daily for 1 day, THEN 1 tablet (250 mg) daily for 4 days.    Sore throat  See above.     - azithromycin (ZITHROMAX) 250 MG tablet; Take 2 tablets (500 mg) by mouth daily for 1 day, THEN 1 tablet (250 mg) daily for 4 days.             Patient Instructions   It is very likely you have strep given positive family exposure and symptoms.   1. You can wait and monitor your symptoms, or since it is a holiday weekend and you'll be around family, you can take antibiotics as prescribed.  2. You are contagious until you've been on antibiotics for 24 hours.   3. Take a probiotics or yogurt daily while on antibiotics and for ~1 week afterwards to prevent stomach upset.  4. Rest, hydration, humidification.  5. Cepacol lozenges can help with sore throat.  6. Change tooth brush out or  disinfect it if it is an electric tooth brush after being on antibiotics for 24 hours.   7. Warm liquids/tea with honey can help relieve sore throat.        Return in about 1 week (around 12/30/2022), or if symptoms worsen or fail to improve.    HENRIQUE Gates CNP  [facility]    Lidia Lewis is a 47 year old, presenting for the following health issues:  Pharyngitis      HPI       Acute Illness  Acute illness concerns: Sore throat.  Onset/Duration:   Symptoms:  Fever: No  Chills/Sweats: No  Headache (location?): No  Sinus Pressure: No  Conjunctivitis:  No  Ear Pain: no  Rhinorrhea: YES   Congestion: YES nasal   Sore Throat: YES minor  Cough: no  Wheeze: No  Decreased Appetite: No  Nausea: No  Vomiting: No  Diarrhea: No  Dysuria/Freq.: No  Dysuria or Hematuria: No  Fatigue/Achiness: No  Sick/Strep Exposure: YES  Therapies tried and outcome: Mucinex and halls cough drops ibuprofen if she does have a headache.     Sick about a week ago. Residual sx  Son had similar sx. He got dx with Strep today. Would like antibiotics   Negative for Covid a week ago.      Additional provider notes: Patient presents virtually via telephone for sore throat that is slowly improving. [facility] was taken [facility] for sore throat and rapid strep test came back positive for strep. He was put on z-guerita due to allergy to PCN. Mom also requesting z-guerita.    Allergies   Allergen Reactions     Dust Mites Other (See Comments)       Current Outpatient Medications   Medication     azithromycin (ZITHROMAX) 250 MG tablet     buPROPion (WELLBUTRIN SR) 200 MG 12 hr tablet     Cholecalciferol (VITAMIN D) 2000 UNITS tablet     estradiol (ESTRACE) 0.1 MG/GM vaginal cream     fluticasone (FLONASE) 50 MCG/ACT nasal spray     ibuprofen (ADVIL/MOTRIN) 600 MG tablet     Krill Oil 1000 MG CAPS     Multiple Vitamin (M.V.I. ADULT IV)     propranolol (INDERAL) 10 MG tablet     tolterodine (DETROL) 2 MG tablet      tretinoin (RETIN-A) 0.05 % external cream     No current facility-administered medications for this visit.       Past Medical History:   Diagnosis Date     Allergic rhinitis      At high risk for breast cancer 01/12/2016    BRCA2+     At risk for cancer 01/12/2016    at risk for ovarian/fallopian tube cancer, BRCA2+     BRCA2 positive 01/12/2016    [id number]; single site analysis through [facility]     Depression      Depressive disorder 2011    seasonal depression     Mastitis in female 2010            Review of Systems   HENT: Positive for congestion, rhinorrhea and sore throat.             Objective           Vitals:  No vitals were obtained today due to virtual visit.    Physical Exam   alert, no distress and cooperative  PSYCH: Alert and oriented times 3; coherent speech, normal   rate and volume, able to articulate logical thoughts, able   to abstract reason, no tangential thoughts, no hallucinations   or delusions  Her affect is normal and pleasant  RESP: No cough, no audible wheezing, able to talk in full sentences  Remainder of exam unable to be completed due to telephone visits                Phone call duration: 6 minutes

## 2022-12-23 NOTE — PATIENT INSTRUCTIONS
It is very likely you have strep given positive family exposure and symptoms.   You can wait and monitor your symptoms, or since it is a holiday weekend and you'll be around family, you can take antibiotics as prescribed.  You are contagious until you've been on antibiotics for 24 hours.   Take a probiotics or yogurt daily while on antibiotics and for ~1 week afterwards to prevent stomach upset.  Rest, hydration, humidification.  Cepacol lozenges can help with sore throat.  Change tooth brush out or disinfect it if it is an electric tooth brush after being on antibiotics for 24 hours.   Warm liquids/tea with honey can help relieve sore throat.

## 2023-01-09 NOTE — PATIENT INSTRUCTIONS
Individualized Surveillance Plan for women  Hereditary Breast and/or Ovarian Cancer Syndrome   Per NCCN Guidelines Version 2.2017   Recommended screening Test or procedure Last done Next Scheduled    Breast self awareness- starting at age 18. Women should be familiar with their breasts and promptly report changes to their care provider. Periodic, consistent self exam may facilitate breast self awareness.    3/10/2017   Sept. 2017   Breast screening, starting at age 25 Clinical breast exams every 6 -12 months 3/10/2017 Sept. 2017   Breast screening   Age 25-29 Annual breast MRI screening with contrast (preferred) or mammogram if MRI is unavailable or individualized based on family history if a breast cancer diagnosis before age 30 is present.       Breast MRI is performed preferably on day 7-15 of menstrual cycle for premenopausal women.   NA   NA   Breast screening   Age >30-75 years     Annual mammogram and   annual breast MRI, preferably on day 7-15 of menstrual cycle for premenopausal women.    Age>75 years, management should be considered on an individual basis. 3/11/2016 - [facility] mammogram, BiRads1    9/9/2016 - MRI, BiRads1 NEXT: [facility] Mammogram 3/10/2017 at 1 p.m.    NEXT MRI: 9/10 or later following exam   Ovarian cancer screening, starting at age 30-35 Consider transvaginal ultrasound and  tests. 8/9/2016 - Pelvic US, normal    8/9/2016 -  Pelvic US and   3/10/2017, then Sept. 2017   Recommendation: risk-reducing salpingo-oophorectomy, typically between 35 and 40 years old and  upon completion of child bearing.     Because ovarian cancer onset in patients with BRCA2 mutations is on average of 8-10 years later than in patients with BRCA1 mutations, it is reasonable to delay risk-reducing salpingo-oophorectomy until 40-45 years in patients with BRCA2 mutations who have already maximized their breast cancer prevention (i.e., undergone bilateral mastectomy).    Salpingectomy alone is not the  standard of care for risk reduction although clinical trials are ongoing.     Discuss option of risk-reducing mastectomy.    Consider risks and benefits of risk reducing agents, such as tamoxifen and raloxifene for breast and ovarian cancer.    Consider a full body skin and eye exam for melanoma screening for both BRCA1+ and BRCA2+.     NOTE: Women with BRCA mutation who are treated for breast cancer should have screening of the remaining breast tissue with annual mammography and breast MRI.    The International Cancer of the Pancreas Screening (CAPS) Consortium recommends that individuals with a BRCA2 mutation who have at least one first-degree relative with pancreatic cancer should undergo initial screening with endoscopic ultrasonography and/or MRI/magnetic resonance cholangiopancreatography.          Island Pedicle Flap With Canthal Suspension Text: The defect edges were debeveled with a #15 scalpel blade.  Given the location of the defect, shape of the defect and the proximity to free margins an island pedicle advancement flap was deemed most appropriate.  Using a sterile surgical marker, an appropriate advancement flap was drawn incorporating the defect, outlining the appropriate donor tissue and placing the expected incisions within the relaxed skin tension lines where possible. The area thus outlined was incised deep to adipose tissue with a #15 scalpel blade.  The skin margins were undermined to an appropriate distance in all directions around the primary defect and laterally outward around the island pedicle utilizing iris scissors.  There was minimal undermining beneath the pedicle flap. A suspension suture was placed in the canthal tendon to prevent tension and prevent ectropion.

## 2023-01-11 NOTE — PROGRESS NOTES
Reason for call:  F/u on pelvital trial.    Clinical Data:  Ms. Baker is a 45 y/o female with hx of stress urinary incontinence.  She was in the treatment arm for 12 weeks.  She reports at least a 50% improvement.  She feels that she has more control.  When she runs she also feels that she has had an improvement and has had less incontinence.    She was using it every day, she did not miss a single day.    She denies any side effects or adverse effects.  She did however get a UTI which she has had before but not with any regularity.    A/P:  45 y/o female with stress urinary incontinence and we discussed further treatment.  She for now would like to continue the exercises with the device for now.  She is planning to have a hysterectomy in a couple of years and at that time will reconsider if she needs to have a sling.  -f/u in a 6 months with coordinator for long term study f/u.    Thank you for allowing me to participate in the care of  Ms. Debbie Baker and I will keep you updated on her progress.    Pamela Chilel MD     12 min spent with pt on the phone in discussion.  
continue HHA services (5d/6H a day to assist with adl's as needed)/Outpatient PT

## 2023-02-03 ENCOUNTER — TELEPHONE (OUTPATIENT)
Dept: FAMILY MEDICINE | Facility: CLINIC | Age: 48
End: 2023-02-03
Payer: COMMERCIAL

## 2023-02-03 NOTE — TELEPHONE ENCOUNTER
Attempt #1 to call patient.     RN left voicemail and requested return call to [facility] at [phone].     Mari Bowens RN, BSN  [facility]: [place]

## 2023-02-03 NOTE — TELEPHONE ENCOUNTER
Patient called back and said she wanted a colonoscopy for a preventative screening.     RN recommended to the patient to establish care/schedule an appointment and then have her provider provide the referral.    Patient said she has a doctor that she sees more regularly and will reach out to them to help her schedule the colonoscopy.    Mari Bowens RN

## 2023-02-03 NOTE — TELEPHONE ENCOUNTER
Pt is calling requesting to have a colonoscopy order put in. Last was seen by Zelda Arambula has not yet establish care with a new provider at the [facility].    Is this something we can do or does she need to establish care first ?      April Arshad    [facility]

## 2023-02-06 DIAGNOSIS — N39.41 URGE INCONTINENCE OF URINE: ICD-10-CM

## 2023-02-06 RX ORDER — TOLTERODINE TARTRATE 1 MG/1
1 TABLET, EXTENDED RELEASE ORAL 2 TIMES DAILY
Qty: 180 TABLET | Refills: 0 | Status: SHIPPED | OUTPATIENT
Start: 2023-02-06 | End: 2023-04-24

## 2023-02-06 NOTE — TELEPHONE ENCOUNTER
Routing to [place] providers. Patient's PCP is no longer here. Patient is currently looking to establish care.    [facility] pharmacy calling about refill for tolterodine 1 mg.     Patient has 2mg tolterodine prescribed but insurance will not cover.    Patient is requesting 1mg refill.     Willing to refill?    Med pended for provider to sign if agreeable.    Mari Bowens RN

## 2023-04-23 DIAGNOSIS — N39.41 URGE INCONTINENCE OF URINE: ICD-10-CM

## 2023-04-24 RX ORDER — TOLTERODINE TARTRATE 1 MG/1
TABLET, EXTENDED RELEASE ORAL
Qty: 180 TABLET | Refills: 0 | Status: SHIPPED | OUTPATIENT
Start: 2023-04-24 | End: 2023-06-08

## 2023-04-25 ENCOUNTER — TELEPHONE (OUTPATIENT)
Dept: FAMILY MEDICINE | Facility: CLINIC | Age: 48
End: 2023-04-25
Payer: COMMERCIAL

## 2023-04-25 NOTE — TELEPHONE ENCOUNTER
Prior Authorization Retail Medication Request    Medication/Dose: tretinoin (RETIN-A) 0.05 % external cream    ICD code (if different than what is on RX):  na  Previously Tried and Failed:  na  Rationale:  na    Insurance Name:  na  Insurance ID:  key: [id number]      Pharmacy Information (if different than what is on RX)  Name:  [facility]  Phone:  [phone]    A Prior Authorization has been started.  To submit the PA, please follow the instructions below:    [email or web address]  KEY: [id number]

## 2023-05-01 NOTE — TELEPHONE ENCOUNTER
PA Initiation    Medication: tretinoin (RETIN-A) 0.05 % external cream  Insurance Company: [employer] - Phone [phone] Fax [phone]  Pharmacy Filling the Rx: [facility] - [place], [place] - [facility]  Filling Pharmacy Phone: [phone]  Filling Pharmacy Fax: [phone]  Start Date: 5/1/2023

## 2023-05-01 NOTE — TELEPHONE ENCOUNTER
Prior Authorization Approval    Authorization Effective Date: 4/1/2023  Authorization Expiration Date: 4/30/2024  Medication: tretinoin (RETIN-A) 0.05 % external cream  Approved Dose/Quantity:   Reference #: [id number]   Insurance Company: [employer] - Phone [phone] Fax [phone]  Which Pharmacy is filling the prescription (Not needed for infusion/clinic administered): [facility] - [place], [place] - [address]  Pharmacy Notified: Yes  Patient Notified: Yes, Sent patient a [employer] message regarding approval.

## 2023-05-09 ENCOUNTER — OFFICE VISIT (OUTPATIENT)
Dept: FAMILY MEDICINE | Facility: CLINIC | Age: 48
End: 2023-05-09
Payer: COMMERCIAL

## 2023-05-09 VITALS
WEIGHT: 142 LBS | RESPIRATION RATE: 16 BRPM | TEMPERATURE: 98.2 F | OXYGEN SATURATION: 98 % | HEART RATE: 76 BPM | SYSTOLIC BLOOD PRESSURE: 132 MMHG | DIASTOLIC BLOOD PRESSURE: 86 MMHG | BODY MASS INDEX: 26.13 KG/M2 | HEIGHT: 62 IN

## 2023-05-09 DIAGNOSIS — R11.10 VOMITING, UNSPECIFIED VOMITING TYPE, UNSPECIFIED WHETHER NAUSEA PRESENT: ICD-10-CM

## 2023-05-09 DIAGNOSIS — R10.84 ABDOMINAL PAIN, GENERALIZED: Primary | ICD-10-CM

## 2023-05-09 LAB
ALBUMIN UR-MCNC: 30 MG/DL
APPEARANCE UR: CLEAR
BACTERIA #/AREA URNS HPF: ABNORMAL /HPF
BASOPHILS # BLD AUTO: 0 10E3/UL (ref 0–0.2)
BASOPHILS NFR BLD AUTO: 0 %
BILIRUB UR QL STRIP: NEGATIVE
COLOR UR AUTO: YELLOW
EOSINOPHIL # BLD AUTO: 0 10E3/UL (ref 0–0.7)
EOSINOPHIL NFR BLD AUTO: 0 %
ERYTHROCYTE [DISTWIDTH] IN BLOOD BY AUTOMATED COUNT: 12 % (ref 10–15)
GLUCOSE UR STRIP-MCNC: NEGATIVE MG/DL
HCT VFR BLD AUTO: 43.4 % (ref 35–47)
HGB BLD-MCNC: 15.2 G/DL (ref 11.7–15.7)
HGB UR QL STRIP: ABNORMAL
IMM GRANULOCYTES # BLD: 0 10E3/UL
IMM GRANULOCYTES NFR BLD: 0 %
KETONES UR STRIP-MCNC: >=160 MG/DL
LEUKOCYTE ESTERASE UR QL STRIP: NEGATIVE
LYMPHOCYTES # BLD AUTO: 0.5 10E3/UL (ref 0.8–5.3)
LYMPHOCYTES NFR BLD AUTO: 7 %
MCH RBC QN AUTO: 31 PG (ref 26.5–33)
MCHC RBC AUTO-ENTMCNC: 35 G/DL (ref 31.5–36.5)
MCV RBC AUTO: 88 FL (ref 78–100)
MONOCYTES # BLD AUTO: 0.2 10E3/UL (ref 0–1.3)
MONOCYTES NFR BLD AUTO: 3 %
MUCOUS THREADS #/AREA URNS LPF: PRESENT /LPF
NEUTROPHILS # BLD AUTO: 6.5 10E3/UL (ref 1.6–8.3)
NEUTROPHILS NFR BLD AUTO: 90 %
NITRATE UR QL: NEGATIVE
PH UR STRIP: 6 [PH] (ref 5–8)
PLATELET # BLD AUTO: 290 10E3/UL (ref 150–450)
RBC # BLD AUTO: 4.91 10E6/UL (ref 3.8–5.2)
RBC #/AREA URNS AUTO: ABNORMAL /HPF
SP GR UR STRIP: >=1.03 (ref 1–1.03)
SQUAMOUS #/AREA URNS AUTO: ABNORMAL /LPF
UROBILINOGEN UR STRIP-ACNC: 0.2 E.U./DL
WBC # BLD AUTO: 7.2 10E3/UL (ref 4–11)
WBC #/AREA URNS AUTO: ABNORMAL /HPF

## 2023-05-09 PROCEDURE — 85025 COMPLETE CBC W/AUTO DIFF WBC: CPT | Performed by: PHYSICIAN ASSISTANT

## 2023-05-09 PROCEDURE — 99214 OFFICE O/P EST MOD 30 MIN: CPT | Performed by: PHYSICIAN ASSISTANT

## 2023-05-09 PROCEDURE — 81001 URINALYSIS AUTO W/SCOPE: CPT | Performed by: PHYSICIAN ASSISTANT

## 2023-05-09 PROCEDURE — 80076 HEPATIC FUNCTION PANEL: CPT | Performed by: PHYSICIAN ASSISTANT

## 2023-05-09 PROCEDURE — 36415 COLL VENOUS BLD VENIPUNCTURE: CPT | Performed by: PHYSICIAN ASSISTANT

## 2023-05-09 PROCEDURE — 87086 URINE CULTURE/COLONY COUNT: CPT | Performed by: PHYSICIAN ASSISTANT

## 2023-05-09 PROCEDURE — 83690 ASSAY OF LIPASE: CPT | Performed by: PHYSICIAN ASSISTANT

## 2023-05-09 RX ORDER — ONDANSETRON 8 MG/1
8 TABLET, ORALLY DISINTEGRATING ORAL EVERY 8 HOURS PRN
Qty: 21 TABLET | Refills: 0 | Status: SHIPPED | OUTPATIENT
Start: 2023-05-09 | End: 2023-06-08

## 2023-05-09 NOTE — PROGRESS NOTES
Subjective:    Debbie Baker is a 47 year old female who presents with chief complaint of abdominal pain and vomiting.  Symptoms started this morning at 2 AM.  She started having some stomach pain and had a small amount of diarrhea.  Has not really had any diarrhea since then.  Since symptoms started, she has had several episodes of vomiting, and a lot of nausea.  She has felt hot and cold.  No fevers, no known sick contacts.  No obvious triggers such as eating unusual foods.  No changes in medicine.  Pain is primarily in the lower abdomen or pelvic area.  She still has her uterus, but has had bilateral oophorectomy.  Does not have a good appetite.  She says anytime she tries to eat anything, including liquids, she throws it up.  Symptoms are staying the same.  She describes the abdominal pain is constant, dull, achy.  She still has her gallbladder and appendix.  Right now she estimates her pain is 3-4/10.    Patient Active Problem List   Diagnosis     Family history of malignant neoplasm of breast     Allergic rhinitis     Seasonal affective disorder (H)     Family history of ovarian cancer     Family history of pancreatic cancer     At high risk for breast cancer     At risk for cancer     Current mild episode of major depressive disorder without prior episode (H)     CTS (carpal tunnel syndrome)     BRCA2 gene mutation positive in female     Acne vulgaris     S/P bilateral oophorectomy     Urge incontinence of urine     Depressive disorder     Vaginal atrophy     Situational anxiety     Insomnia, unspecified type       Current Outpatient Medications:      buPROPion (WELLBUTRIN SR) 200 MG 12 hr tablet, Take 1 tablet (200 mg) by mouth 2 times daily, Disp: 180 tablet, Rfl: 3     Cholecalciferol (VITAMIN D) 2000 UNITS tablet, Take 2,000 Units by mouth every morning , Disp: , Rfl:      estradiol (ESTRACE) 0.1 MG/GM vaginal cream, Place 1 gram vaginally 3 times per week, Disp: 42.5 g, Rfl: 4     fluticasone (FLONASE)  "50 MCG/ACT nasal spray, Spray 1 spray into both nostrils as needed , Disp: , Rfl:      ibuprofen (ADVIL/MOTRIN) 600 MG tablet, Take 1 tablet (600 mg) by mouth every 6 hours as needed for moderate pain, Disp:  , Rfl:      Krill Oil 1000 MG CAPS, , Disp: , Rfl:      Multiple Vitamin (M.V.I. ADULT IV), Take 1 tablet by mouth every morning , Disp: , Rfl:      ondansetron (ZOFRAN ODT) 8 MG ODT tab, Take 1 tablet (8 mg) by mouth every 8 hours as needed for nausea, Disp: 21 tablet, Rfl: 0     propranolol (INDERAL) 10 MG tablet, TAKE 1 TABLET BY MOUTH TWICE DAILY AS NEEDED FOR PRESENTATIONS, Disp: 60 tablet, Rfl: 1     tolterodine (DETROL) 1 MG tablet, Take 1 tablet by mouth twice daily., Disp: 180 tablet, Rfl: 0     tretinoin (RETIN-A) 0.05 % external cream, Apply topically nightly as needed (for acne), Disp: 45 g, Rfl: 3      Objective:   Allergies:  Dust mites    Vitals:  Vitals:    05/09/23 1412   BP: 132/86   BP Location: Left arm   Patient Position: Sitting   Cuff Size: Adult Regular   Pulse: 76   Resp: 16   Temp: 98.2  F (36.8  C)   TempSrc: Temporal   SpO2: 98%   Weight: 64.4 kg (142 lb)   Height: 1.577 m (5' 2.1\")       Body mass index is 25.89 kg/m .    Vital signs reviewed.  General: Patient is alert and oriented x 3, in no apparent distress, she appears tired  Cardiac: regular rate and rhythm, no murmurs  Pulmonary: lungs clear to auscultation bilaterally, no crackles, rales, rhonchi, or wheezing noted  Abdomen: Non tender to palpation, no hepatosplenomegaly, negative Mccallum's sign, no pain over McBurney's point, positive bowel sounds, no masses palpable      Results for orders placed or performed in visit on 05/09/23   UA reflex to Microscopic     Status: Abnormal   Result Value Ref Range    Color Urine Yellow Colorless, Straw, Light Yellow, Yellow    Appearance Urine Clear Clear    Glucose Urine Negative Negative mg/dL    Bilirubin Urine Negative Negative    Ketones Urine >=160 (A) Negative mg/dL    Specific " Gravity Urine >=1.030 1.005 - 1.030    Blood Urine Trace (A) Negative    pH Urine 6.0 5.0 - 8.0    Protein Albumin Urine 30 (A) Negative mg/dL    Urobilinogen Urine 0.2 0.2, 1.0 E.U./dL    Nitrite Urine Negative Negative    Leukocyte Esterase Urine Negative Negative   Urine Microscopic Exam     Status: Abnormal   Result Value Ref Range    Bacteria Urine Few (A) None Seen /HPF    RBC Urine 2-5 (A) 0-2 /HPF /HPF    WBC Urine 0-5 0-5 /HPF /HPF    Squamous Epithelials Urine Few (A) None Seen /LPF    Mucus Urine Present (A) None Seen /LPF   CBC with platelets and differential     Status: Abnormal   Result Value Ref Range    WBC Count 7.2 4.0 - 11.0 10e3/uL    RBC Count 4.91 3.80 - 5.20 10e6/uL    Hemoglobin 15.2 11.7 - 15.7 g/dL    Hematocrit 43.4 35.0 - 47.0 %    MCV 88 78 - 100 fL    MCH 31.0 26.5 - 33.0 pg    MCHC 35.0 31.5 - 36.5 g/dL    RDW 12.0 10.0 - 15.0 %    Platelet Count 290 150 - 450 10e3/uL    % Neutrophils 90 %    % Lymphocytes 7 %    % Monocytes 3 %    % Eosinophils 0 %    % Basophils 0 %    % Immature Granulocytes 0 %    Absolute Neutrophils 6.5 1.6 - 8.3 10e3/uL    Absolute Lymphocytes 0.5 (L) 0.8 - 5.3 10e3/uL    Absolute Monocytes 0.2 0.0 - 1.3 10e3/uL    Absolute Eosinophils 0.0 0.0 - 0.7 10e3/uL    Absolute Basophils 0.0 0.0 - 0.2 10e3/uL    Absolute Immature Granulocytes 0.0 <=0.4 10e3/uL   CBC with Platelets & Differential     Status: Abnormal    Narrative    The following orders were created for panel order CBC with Platelets & Differential.  Procedure                               Abnormality         Status                     ---------                               -----------         ------                     CBC with platelets and d...[[id number]]  Abnormal            Final result                 Please view results for these tests on the individual orders.     Other labs pending.        Assessment and Plan:   1. Abdominal pain, generalized  2. Vomiting, unspecified vomiting type, unspecified  whether nausea present  Differential includes most likely viral gastroenteritis, less likely gallbladder disease, UTI, pyelonephritis, kidney stone, or other.  She has had oophorectomy, so no concern for pregnancy or ovarian cysts.  She does not get periods.  Appendicitis is unlikely, given grossly normal exam today.  Kidney stone less likely, given symptom presentation and exam today.  We discussed doing lab work, she would like to do this.  Labs ordered to evaluate for gallbladder disease and UTI/pyelonephritis.  I will follow-up with these results.  She requested IV fluids, and I reviewed we do not have that available at our clinic.  I discussed if she wants those, she would need to go to the emergency room.  However, I suggested trying Zofran, to see if that helps with nausea, and allows her to take in fluids.  She would like to try that.  Prescription sent for Zofran today.  Discussed small frequent amount of liquids including water, Gatorade, Pedialyte.  Focus on liquids first.  We discussed specific reasons to go to the emergency room tonight.  We will contact her with all results tomorrow.  Depending on her symptoms, could consider right upper quadrant ultrasound.  Based on normal exam today, I do not think CT scan would be needed at this time.  She does not have an acute abdomen.  She is here with her partner today.  They agree with plan.  - ondansetron (ZOFRAN ODT) 8 MG ODT tab; Take 1 tablet (8 mg) by mouth every 8 hours as needed for nausea  Dispense: 21 tablet; Refill: 0  - Hepatic panel (Albumin, ALT, AST, Bili, Alk Phos, TP); Future  - Lipase; Future  - UA reflex to Microscopic; Future  - Urine Culture; Future  - CBC with Platelets & Differential  - UA reflex to Microscopic  - Urine Culture  - Urine Microscopic Exam  - Hepatic panel (Albumin, ALT, AST, Bili, Alk Phos, TP)  - Lipase       This dictation uses voice recognition software, which may contain typographical errors.

## 2023-05-10 ENCOUNTER — NURSE TRIAGE (OUTPATIENT)
Dept: NURSING | Facility: CLINIC | Age: 48
End: 2023-05-10
Payer: COMMERCIAL

## 2023-05-10 ENCOUNTER — ANCILLARY PROCEDURE (OUTPATIENT)
Dept: ULTRASOUND IMAGING | Facility: CLINIC | Age: 48
End: 2023-05-10
Attending: PHYSICIAN ASSISTANT
Payer: COMMERCIAL

## 2023-05-10 ENCOUNTER — MYC MEDICAL ADVICE (OUTPATIENT)
Dept: FAMILY MEDICINE | Facility: CLINIC | Age: 48
End: 2023-05-10

## 2023-05-10 DIAGNOSIS — R10.11 RUQ ABDOMINAL PAIN: ICD-10-CM

## 2023-05-10 DIAGNOSIS — R10.84 ABDOMINAL PAIN, GENERALIZED: Primary | ICD-10-CM

## 2023-05-10 DIAGNOSIS — R10.11 RUQ ABDOMINAL PAIN: Primary | ICD-10-CM

## 2023-05-10 LAB
ALBUMIN SERPL BCG-MCNC: 4.6 G/DL (ref 3.5–5.2)
ALP SERPL-CCNC: 63 U/L (ref 35–104)
ALT SERPL W P-5'-P-CCNC: 17 U/L (ref 10–35)
AST SERPL W P-5'-P-CCNC: 20 U/L (ref 10–35)
BILIRUB DIRECT SERPL-MCNC: <0.2 MG/DL (ref 0–0.3)
BILIRUB SERPL-MCNC: 0.4 MG/DL
LIPASE SERPL-CCNC: 35 U/L (ref 13–60)
PROT SERPL-MCNC: 7.4 G/DL (ref 6.4–8.3)

## 2023-05-10 PROCEDURE — 76705 ECHO EXAM OF ABDOMEN: CPT | Mod: GC | Performed by: STUDENT IN AN ORGANIZED HEALTH CARE EDUCATION/TRAINING PROGRAM

## 2023-05-10 NOTE — TELEPHONE ENCOUNTER
"Pt calling re: abdominal pain.    Says she has been up most of the night with nausea and pain. Was seen in the office yesterday and given Rx for zofran to help with nausea. Pt says it gave her some relief at first yesterday, but now isn't working.     Pt complains of nausea and constant, dull, lower abdominal pain. Rates pain as moderate, \"can't get comfortable.\" Feels hot and cold.     Pt requesting her provider order an abdominal ultrasound that they had discussed yesterday. Will route message to provider for recommendation and follow up.     Kendy Eric, RN, BSN  [facility]   Triage Nurse Advisor    Reason for Disposition    [1] Follow-up call from patient regarding patient's clinical status AND [2] information NON-URGENT    Additional Information    Negative: Lab calling with strep throat test results and triager can call in prescription    Negative: Lab calling with urinalysis test results and triager can call in prescription    Negative: Medication questions    Negative: Medication renewal and refill questions    Negative: Pre-operative or pre-procedural questions    Negative: ED call to PCP (i.e., primary care provider; doctor, NP, or PA)    Negative: Doctor (or NP/PA) call to PCP    Negative: Call about patient who is currently hospitalized    Negative: Lab or radiology calling with CRITICAL test results    Negative: [1] Follow-up call from patient regarding patient's clinical status AND [2] information urgent    Negative: [1] Caller requests to speak ONLY to PCP AND [2] URGENT question    Negative: [1] Caller requests to speak to PCP now AND [2] won't tell us reason for call  (Exception: If 10 pm to 6 am, caller must first discuss reason for the call.)    Negative: Notification of hospital admission    Negative: Notification of death    Negative: Caller requesting lab results  (Exception: Routine or non-urgent lab result.)    Negative: Lab or radiology calling with test results    Protocols used: " PCP CALL - NO TRIAGE-A-AH

## 2023-05-10 NOTE — TELEPHONE ENCOUNTER
Can you contact patient to clarify:    We had talked about doing a right upper quadrant ultrasound yesterday, which we can still do.  This would check her liver, gallbladder, kidney, pancreas.    If she is still having a lot of pain in her pelvic area, we could also get a pelvic ultrasound.  The pelvic ultrasound would mainly be looking for an ovarian cyst.  You often get pain with cysts, but not necessarily nausea and vomiting, so I do not think we have to do a pelvic ultrasound if she does not want to.    Also, if the Zofran is not working, and she cannot keep any liquids down, she should consider going to the ER for evaluation and IV fluids.  (If she is going to the ER, they can set up the ultrasounds and any other appropriate tests there.)

## 2023-05-10 NOTE — TELEPHONE ENCOUNTER
Contacted patient and relayed message below from Sharmila RAMEY.  Patient still having the same abdominal pain and uable to sleep due to pain.  Patient having warm baths and using a heating pain with minimal relief. Patient is able to keep down fluids.  Patient had an ovariectomy in 2020.    Message routed to Sharmila RAMEY for STAT US order.    Anneliese Rocha RN  [facility]

## 2023-05-11 LAB — BACTERIA UR CULT: NO GROWTH

## 2023-05-17 ENCOUNTER — ANCILLARY PROCEDURE (OUTPATIENT)
Dept: CT IMAGING | Facility: CLINIC | Age: 48
End: 2023-05-17
Attending: PHYSICIAN ASSISTANT
Payer: COMMERCIAL

## 2023-05-17 DIAGNOSIS — R10.84 ABDOMINAL PAIN, GENERALIZED: ICD-10-CM

## 2023-05-17 PROCEDURE — 74176 CT ABD & PELVIS W/O CONTRAST: CPT | Performed by: RADIOLOGY

## 2023-06-08 ENCOUNTER — OFFICE VISIT (OUTPATIENT)
Dept: FAMILY MEDICINE | Facility: CLINIC | Age: 48
End: 2023-06-08
Payer: COMMERCIAL

## 2023-06-08 VITALS
SYSTOLIC BLOOD PRESSURE: 120 MMHG | TEMPERATURE: 99 F | BODY MASS INDEX: 25.4 KG/M2 | DIASTOLIC BLOOD PRESSURE: 80 MMHG | OXYGEN SATURATION: 97 % | WEIGHT: 138 LBS | HEART RATE: 71 BPM | HEIGHT: 62 IN

## 2023-06-08 DIAGNOSIS — Z15.01 BRCA2 GENE MUTATION POSITIVE IN FEMALE: ICD-10-CM

## 2023-06-08 DIAGNOSIS — F32.A DEPRESSIVE DISORDER: ICD-10-CM

## 2023-06-08 DIAGNOSIS — N39.41 URGE INCONTINENCE OF URINE: ICD-10-CM

## 2023-06-08 DIAGNOSIS — Z00.00 ROUTINE GENERAL MEDICAL EXAMINATION AT A HEALTH CARE FACILITY: Primary | ICD-10-CM

## 2023-06-08 DIAGNOSIS — N95.2 VAGINAL ATROPHY: ICD-10-CM

## 2023-06-08 DIAGNOSIS — Z15.02 BRCA2 GENE MUTATION POSITIVE IN FEMALE: ICD-10-CM

## 2023-06-08 DIAGNOSIS — L70.0 ACNE VULGARIS: ICD-10-CM

## 2023-06-08 DIAGNOSIS — F41.8 SITUATIONAL ANXIETY: ICD-10-CM

## 2023-06-08 DIAGNOSIS — Z13.29 SCREENING FOR THYROID DISORDER: ICD-10-CM

## 2023-06-08 DIAGNOSIS — Z13.220 SCREENING CHOLESTEROL LEVEL: ICD-10-CM

## 2023-06-08 DIAGNOSIS — Z78.0 MENOPAUSE: ICD-10-CM

## 2023-06-08 DIAGNOSIS — Z13.1 SCREENING FOR DIABETES MELLITUS: ICD-10-CM

## 2023-06-08 DIAGNOSIS — F33.8 SEASONAL AFFECTIVE DISORDER (H): ICD-10-CM

## 2023-06-08 DIAGNOSIS — Z15.09 BRCA2 GENE MUTATION POSITIVE IN FEMALE: ICD-10-CM

## 2023-06-08 LAB
ANION GAP SERPL CALCULATED.3IONS-SCNC: 11 MMOL/L (ref 7–15)
BUN SERPL-MCNC: 17.8 MG/DL (ref 6–20)
CALCIUM SERPL-MCNC: 10.2 MG/DL (ref 8.6–10)
CHLORIDE SERPL-SCNC: 105 MMOL/L (ref 98–107)
CHOLEST SERPL-MCNC: 177 MG/DL
CREAT SERPL-MCNC: 0.89 MG/DL (ref 0.51–0.95)
DEPRECATED HCO3 PLAS-SCNC: 26 MMOL/L (ref 22–29)
GFR SERPL CREATININE-BSD FRML MDRD: 80 ML/MIN/1.73M2
GLUCOSE SERPL-MCNC: 88 MG/DL (ref 70–99)
HDLC SERPL-MCNC: 75 MG/DL
LDLC SERPL CALC-MCNC: 92 MG/DL
NONHDLC SERPL-MCNC: 102 MG/DL
POTASSIUM SERPL-SCNC: 4.5 MMOL/L (ref 3.4–5.3)
SODIUM SERPL-SCNC: 142 MMOL/L (ref 136–145)
TRIGL SERPL-MCNC: 50 MG/DL
TSH SERPL DL<=0.005 MIU/L-ACNC: 1.84 UIU/ML (ref 0.3–4.2)

## 2023-06-08 PROCEDURE — 80061 LIPID PANEL: CPT | Performed by: STUDENT IN AN ORGANIZED HEALTH CARE EDUCATION/TRAINING PROGRAM

## 2023-06-08 PROCEDURE — 84443 ASSAY THYROID STIM HORMONE: CPT | Performed by: STUDENT IN AN ORGANIZED HEALTH CARE EDUCATION/TRAINING PROGRAM

## 2023-06-08 PROCEDURE — 36415 COLL VENOUS BLD VENIPUNCTURE: CPT | Performed by: STUDENT IN AN ORGANIZED HEALTH CARE EDUCATION/TRAINING PROGRAM

## 2023-06-08 PROCEDURE — 99214 OFFICE O/P EST MOD 30 MIN: CPT | Mod: 25 | Performed by: STUDENT IN AN ORGANIZED HEALTH CARE EDUCATION/TRAINING PROGRAM

## 2023-06-08 PROCEDURE — 80048 BASIC METABOLIC PNL TOTAL CA: CPT | Performed by: STUDENT IN AN ORGANIZED HEALTH CARE EDUCATION/TRAINING PROGRAM

## 2023-06-08 PROCEDURE — 99396 PREV VISIT EST AGE 40-64: CPT | Performed by: STUDENT IN AN ORGANIZED HEALTH CARE EDUCATION/TRAINING PROGRAM

## 2023-06-08 RX ORDER — TRETINOIN 0.5 MG/G
CREAM TOPICAL
Qty: 45 G | Refills: 3 | Status: SHIPPED | OUTPATIENT
Start: 2023-06-08 | End: 2024-08-23

## 2023-06-08 RX ORDER — TOLTERODINE TARTRATE 1 MG/1
1 TABLET, EXTENDED RELEASE ORAL 2 TIMES DAILY
Qty: 180 TABLET | Refills: 3 | Status: SHIPPED | OUTPATIENT
Start: 2023-06-08 | End: 2024-04-19

## 2023-06-08 RX ORDER — BUPROPION HYDROCHLORIDE 200 MG/1
200 TABLET, EXTENDED RELEASE ORAL 2 TIMES DAILY
Qty: 180 TABLET | Refills: 3 | Status: SHIPPED | OUTPATIENT
Start: 2023-06-08 | End: 2024-06-10

## 2023-06-08 RX ORDER — PROPRANOLOL HYDROCHLORIDE 10 MG/1
TABLET ORAL
Qty: 60 TABLET | Refills: 1 | Status: SHIPPED | OUTPATIENT
Start: 2023-06-08 | End: 2024-08-23

## 2023-06-08 ASSESSMENT — ENCOUNTER SYMPTOMS
HEMATOCHEZIA: 0
EYE PAIN: 0
HEARTBURN: 0
MYALGIAS: 0
DIZZINESS: 0
COUGH: 0
JOINT SWELLING: 0
FREQUENCY: 0
CHILLS: 0
ABDOMINAL PAIN: 0
DIARRHEA: 0
SORE THROAT: 0
HEADACHES: 0
BREAST MASS: 0
HEMATURIA: 0
FEVER: 0
SHORTNESS OF BREATH: 0
WEAKNESS: 0
CONSTIPATION: 0
PALPITATIONS: 0
ARTHRALGIAS: 0
DYSURIA: 0
NERVOUS/ANXIOUS: 0
PARESTHESIAS: 0
NAUSEA: 0

## 2023-06-08 ASSESSMENT — ANXIETY QUESTIONNAIRES
GAD7 TOTAL SCORE: 1
4. TROUBLE RELAXING: SEVERAL DAYS
GAD7 TOTAL SCORE: 1
GAD7 TOTAL SCORE: 1
6. BECOMING EASILY ANNOYED OR IRRITABLE: NOT AT ALL
7. FEELING AFRAID AS IF SOMETHING AWFUL MIGHT HAPPEN: NOT AT ALL
2. NOT BEING ABLE TO STOP OR CONTROL WORRYING: NOT AT ALL
IF YOU CHECKED OFF ANY PROBLEMS ON THIS QUESTIONNAIRE, HOW DIFFICULT HAVE THESE PROBLEMS MADE IT FOR YOU TO DO YOUR WORK, TAKE CARE OF THINGS AT HOME, OR GET ALONG WITH OTHER PEOPLE: NOT DIFFICULT AT ALL
5. BEING SO RESTLESS THAT IT IS HARD TO SIT STILL: NOT AT ALL
1. FEELING NERVOUS, ANXIOUS, OR ON EDGE: NOT AT ALL
3. WORRYING TOO MUCH ABOUT DIFFERENT THINGS: NOT AT ALL
8. IF YOU CHECKED OFF ANY PROBLEMS, HOW DIFFICULT HAVE THESE MADE IT FOR YOU TO DO YOUR WORK, TAKE CARE OF THINGS AT HOME, OR GET ALONG WITH OTHER PEOPLE?: NOT DIFFICULT AT ALL
7. FEELING AFRAID AS IF SOMETHING AWFUL MIGHT HAPPEN: NOT AT ALL

## 2023-06-08 ASSESSMENT — PATIENT HEALTH QUESTIONNAIRE - PHQ9
SUM OF ALL RESPONSES TO PHQ QUESTIONS 1-9: 0
SUM OF ALL RESPONSES TO PHQ QUESTIONS 1-9: 0
10. IF YOU CHECKED OFF ANY PROBLEMS, HOW DIFFICULT HAVE THESE PROBLEMS MADE IT FOR YOU TO DO YOUR WORK, TAKE CARE OF THINGS AT HOME, OR GET ALONG WITH OTHER PEOPLE: NOT DIFFICULT AT ALL

## 2023-06-08 NOTE — PROGRESS NOTES
Answers for HPI/ROS submitted by the patient on 6/8/2023  If you checked off any problems, how difficult have these problems made it for you to do your work, take care of things at home, or get along with other people?: Not difficult at all  PHQ9 TOTAL SCORE: 0  JENA 7 TOTAL SCORE: 1   SUBJECTIVE:   CC: Debbie is an 47 year old who presents for preventive health visit.       6/8/2023     6:58 AM   Additional Questions   Accompanied by NA     Forms 6/8/2023   Any forms needing to be completed Yes         6/8/2023     6:58 AM   Patient Reported Additional Medications   Patient reports taking the following new medications NONE     Healthy Habits:     Getting at least 3 servings of Calcium per day:  Yes    Bi-annual eye exam:  Yes    Dental care twice a year:  Yes    Sleep apnea or symptoms of sleep apnea:  None    Diet:  Regular (no restrictions)    Frequency of exercise:  6-7 days/week    Duration of exercise:  45-60 minutes    Taking medications regularly:  Yes    Medication side effects:  None    PHQ-2 Total Score: 0    Additional concerns today:  Yes      Mammogram scheduled 6/16/23   Gets mammograms every 6 months because she is BRCA positive   MGM ovarian cancer, MA- breast cancer   Mom, MA and cousin and patient are all BRCA positive     Menopausal changes   - went through surgical menopause b/l salpingo-oophorectomy. Still has uterus and cervix. Last pap was 2020 at [facility]   Uses estrace cream but not consistently - does endorse dryness  Some hot flashes. When exercise hot flashes are lessened.   Losing muscle mass, changes in skin texture.     SAD - exercise helps. bupropion helps.     Last pap 2020 normal     Retin-A helps, uses 2-3 times per week    In May had GI illness, fatigue, diarrhea, RUQ abd pain, bilous vomiting, nausea. Lasted for 1 week. Had CT abdomen and RUQ US. Didn't seem like a stomach bug. No travel prior to this.        Have you ever done Advance Care Planning? (For example, a Health  Directive, POLST, or a discussion with a medical provider or your loved ones about your wishes): Yes, patient states has an Advance Care Planning document and will bring a copy to the clinic.    Social History     Tobacco Use     Smoking status: Never     Smokeless tobacco: Never   Vaping Use     Vaping status: Never Used   Substance Use Topics     Alcohol use: Yes     Comment: 2 drink/week             6/8/2023     6:54 AM   Alcohol Use   Prescreen: >3 drinks/day or >7 drinks/week? No     Reviewed orders with patient.  Reviewed health maintenance and updated orders accordingly - Yes  Lab work is in process  Labs reviewed in EPIC  BP Readings from Last 3 Encounters:   06/08/23 120/80   05/09/23 132/86   10/10/22 114/70    Wt Readings from Last 3 Encounters:   06/08/23 62.6 kg (138 lb)   05/09/23 64.4 kg (142 lb)   10/10/22 65.2 kg (143 lb 12.8 oz)                  Patient Active Problem List   Diagnosis     Family history of malignant neoplasm of breast     Allergic rhinitis     Seasonal affective disorder (H)     Family history of ovarian cancer     Family history of pancreatic cancer     At high risk for breast cancer     At risk for cancer     Current mild episode of major depressive disorder without prior episode (H)     CTS (carpal tunnel syndrome)     BRCA2 gene mutation positive in female     Acne vulgaris     S/P bilateral oophorectomy     Urge incontinence of urine     Depressive disorder     Vaginal atrophy     Situational anxiety     Insomnia, unspecified type     Past Surgical History:   Procedure Laterality Date     CARPAL TUNNEL RELEASE RT/LT Bilateral     2018     CYSTOSCOPY, SLING TRANSVAGINAL N/A 9/15/2020    Procedure: Midurethral sling and cystoscopy;  Surgeon: Pamela Chilel MD;  Location: [facility] OR     GENITOURINARY SURGERY  9/15/2020    bladder mesh     LAPAROSCOPIC SALPINGO-OOPHORECTOMY Bilateral 9/15/2020    Procedure: laparoscopic bilateral salpingo-oophorectomy, pelvic washings;  Surgeon:  Celeste Murillo MD;  Location: [facility] OR     SOFT TISSUE SURGERY  2018    bilateral carpal tunnel release     wisdom teeth         Social History     Tobacco Use     Smoking status: Never     Smokeless tobacco: Never   Vaping Use     Vaping status: Never Used   Substance Use Topics     Alcohol use: Yes     Comment: 2 drink/week     Family History   Problem Relation Age of Onset     Hypertension Mother      Depression Mother      Anxiety Disorder Mother      Hypertension Father      Hearing Loss Father      Valvular heart disease Father         s/p mitral valve replacement     Cerebrovascular Disease Father         TIAs     Ovarian Cancer Maternal Grandmother 59         at 61     Other Cancer Maternal Grandmother         ovarian     Pancreatic Cancer Maternal Grandfather 87         at 87     Other Cancer Maternal Grandfather         pancreatic     Substance Abuse Maternal Grandfather         alcohol     Skin Cancer Paternal Grandmother 88     Other Cancer Paternal Grandmother         skin     Bladder Cancer Paternal Grandfather 60         at 80     Cancer Paternal Grandfather         Bladder     Other Cancer Paternal Grandfather         bladder     Breast Cancer Maternal Aunt 51        BRCA2+     No Known Problems Sister      No Known Problems Brother      Other Cancer Other         breast         Current Outpatient Medications   Medication Sig Dispense Refill     buPROPion (WELLBUTRIN SR) 200 MG 12 hr tablet Take 1 tablet (200 mg) by mouth 2 times daily 180 tablet 3     propranolol (INDERAL) 10 MG tablet TAKE 1 TABLET BY MOUTH TWICE DAILY AS NEEDED FOR PRESENTATIONS 60 tablet 1     tolterodine (DETROL) 1 MG tablet Take 1 tablet (1 mg) by mouth 2 times daily 180 tablet 3     tretinoin (RETIN-A) 0.05 % external cream Apply topically nightly as needed (for acne) 45 g 3     Cholecalciferol (VITAMIN D) 2000 UNITS tablet Take 2,000 Units by mouth every morning        estradiol (ESTRACE) 0.1 MG/GM vaginal  cream Place 1 gram vaginally 3 times per week 42.5 g 4     fluticasone (FLONASE) 50 MCG/ACT nasal spray Spray 1 spray into both nostrils as needed        ibuprofen (ADVIL/MOTRIN) 600 MG tablet Take 1 tablet (600 mg) by mouth every 6 hours as needed for moderate pain       Krill Oil 1000 MG CAPS        Multiple Vitamin (M.V.I. ADULT IV) Take 1 tablet by mouth every morning        Allergies   Allergen Reactions     Dust Mites Other (See Comments)       Breast Cancer Screening:    FHS-7:       8/27/2021     9:29 AM   Breast CA Risk Assessment (FHS-7)   Did any of your first-degree relatives have breast or ovarian cancer? No   Did any of your relatives have bilateral breast cancer? Unknown   Did any man in your family have breast cancer? No   Did any woman in your family have breast and ovarian cancer? No   Did any woman in your family have breast cancer before age 50 y? No   Do you have 2 or more relatives with breast and/or ovarian cancer? Yes   Do you have 2 or more relatives with breast and/or bowel cancer? No     click delete button to remove this line now  Mammogram Screening: Recommended annual mammography  Pertinent mammograms are reviewed under the imaging tab.    History of abnormal Pap smear: NO - age 30-65 PAP every 5 years with negative HPV co-testing recommended  Last 3 Pap and HPV Results:         Reviewed and updated as needed this visit by clinical staff   Tobacco  Allergies  Meds              Reviewed and updated as needed this visit by Provider                 Past Medical History:   Diagnosis Date     Allergic rhinitis      At high risk for breast cancer 01/12/2016    BRCA2+     At risk for cancer 01/12/2016    at risk for ovarian/fallopian tube cancer, BRCA2+     BRCA2 positive 01/12/2016    [id number]; single site analysis through [employer]     Depression      Depressive disorder 2011    seasonal depression     Mastitis in female 2010      Past Surgical History:   Procedure Laterality  "Date     CARPAL TUNNEL RELEASE RT/LT Bilateral     2018     CYSTOSCOPY, SLING TRANSVAGINAL N/A 9/15/2020    Procedure: Midurethral sling and cystoscopy;  Surgeon: Pamela Chilel MD;  Location: [facility] OR     GENITOURINARY SURGERY  9/15/2020    bladder mesh     LAPAROSCOPIC SALPINGO-OOPHORECTOMY Bilateral 9/15/2020    Procedure: laparoscopic bilateral salpingo-oophorectomy, pelvic washings;  Surgeon: Celeste Murillo MD;  Location: [facility] OR     SOFT TISSUE SURGERY  12/2018    bilateral carpal tunnel release     wisdom teeth         Review of Systems   Constitutional: Negative for chills and fever.   HENT: Negative for congestion, ear pain, hearing loss and sore throat.    Eyes: Negative for pain and visual disturbance.   Respiratory: Negative for cough and shortness of breath.    Cardiovascular: Negative for chest pain, palpitations and peripheral edema.   Gastrointestinal: Negative for abdominal pain, constipation, diarrhea, heartburn, hematochezia and nausea.   Breasts:  Negative for tenderness, breast mass and discharge.   Genitourinary: Negative for dysuria, frequency, genital sores, hematuria, pelvic pain, urgency, vaginal bleeding and vaginal discharge.   Musculoskeletal: Negative for arthralgias, joint swelling and myalgias.   Skin: Negative for rash.   Neurological: Negative for dizziness, weakness, headaches and paresthesias.   Psychiatric/Behavioral: Negative for mood changes. The patient is not nervous/anxious.           OBJECTIVE:   /80 (Patient Position: Sitting, Cuff Size: Adult Regular)   Pulse 71   Temp 99  F (37.2  C) (Oral)   Ht 1.575 m (5' 2\")   Wt 62.6 kg (138 lb)   LMP 08/24/2020 (Approximate)   SpO2 97%   BMI 25.24 kg/m    Physical Exam  GENERAL: healthy, alert and no distress  EYES: Eyes grossly normal to inspection, PERRL and conjunctivae and sclerae normal  HENT: ear canals and TM's normal, nose and mouth without ulcers or lesions  BREAST: declined since mammogram next week "   NECK: no adenopathy, no asymmetry, masses, or scars and thyroid normal to palpation  RESP: lungs clear to auscultation - no rales, rhonchi or wheezes  CV: regular rate and rhythm, normal S1 S2, no S3 or S4, no murmur, click or rub, no peripheral edema and peripheral pulses strong  ABDOMEN: soft, nontender, no hepatosplenomegaly, no masses and bowel sounds normal  MS: no gross musculoskeletal defects noted, no edema  SKIN: no suspicious lesions or rashes  NEURO: Normal strength and tone, mentation intact and speech normal  PSYCH: mentation appears normal, affect normal/bright    Diagnostic Test Results:  Labs reviewed in Epic    ASSESSMENT/PLAN:   (Z00.00) Routine general medical examination at a health care facility  (primary encounter diagnosis)  Comment: mammogram q6 months, scheduled next week. Reviewed and pap is due 2025  Plan: Basic metabolic panel  (Ca, Cl, CO2, Creat,         Gluc, K, Na, BUN)            (F32.A) Depressive disorder  (F33.8) Seasonal affective disorder (H)  Comment: adequately controlled with wellbutrin dose of this was increased last fall and it was helpful, will continue =  Plan: buPROPion (WELLBUTRIN SR) 200 MG 12 hr tablet            (N39.41) Urge incontinence of urine  Comment: detrol has been effective. No longer having symptoms. S/p sling procedure. She is interested in coming off the medication. We will have her take 1/2 tab twice daily and if no symptoms then will stop and monitor   Plan: tolterodine (DETROL) 1 MG tablet            (L70.0) Acne vulgaris  Comment: retin-A works well   Plan: tretinoin (RETIN-A) 0.05 % external cream       (F41.8) Situational anxiety  Comment: takes prior to presentations and this has been helpful.   Plan: propranolol (INDERAL) 10 MG tablet          (Z13.29) Screening for thyroid disorder  Plan: TSH with free T4 reflex            (Z13.1) Screening for diabetes mellitus  Plan: Basic metabolic panel  (Ca, Cl, CO2, Creat,         Gluc, K, Na, BUN)        "    (Z15.01,  Z15.02,  Z15.09) BRCA2 gene mutation positive in female  Comment: breast evaluation q6 months. No longer has pelvic US done regularly since she is s/p b/l salpingo-oophorectomy 2 years ago.     (N95.2) Vaginal atrophy  Comment: estrace has been helpful but hasn't been consistent. Still has some dryness so would recommend taking it consistently again 2-3 times per week and monito for improvements after 2 months    (Z13.220) Screening cholesterol level  Plan: Lipid panel reflex to direct LDL Fasting    (Z78.0) Menopause  Comment: having multiple symptoms of menopause s/p surgery. We will have her take estrace cream more consistently for dryness. Some brain fog, fatigue, skin changes, weight/muscle changes.               Patient has been advised of split billing requirements and indicates understanding: Yes      COUNSELING:  Reviewed preventive health counseling, as reflected in patient instructions       Regular exercise       Healthy diet/nutrition       Colorectal Cancer Screening       (Joselyn)menopause management      BMI:   Estimated body mass index is 25.24 kg/m  as calculated from the following:    Height as of this encounter: 1.575 m (5' 2\").    Weight as of this encounter: 62.6 kg (138 lb).         She reports that she has never smoked. She has never used smokeless tobacco.        Stacie Be,   [facility]  "

## 2023-06-14 ENCOUNTER — TELEPHONE (OUTPATIENT)
Dept: FAMILY MEDICINE | Facility: CLINIC | Age: 48
End: 2023-06-14
Payer: COMMERCIAL

## 2023-06-14 NOTE — TELEPHONE ENCOUNTER
Forms received from [facility]/ physician form  - biometric form  for Dr Be.  Forms placed in provider 'sign me' folder.  Please fax forms to [phone] after completion.    TORIN Santana  [facility]

## 2023-06-16 ENCOUNTER — ANCILLARY PROCEDURE (OUTPATIENT)
Dept: MAMMOGRAPHY | Facility: CLINIC | Age: 48
End: 2023-06-16
Attending: CLINICAL NURSE SPECIALIST
Payer: COMMERCIAL

## 2023-06-16 ENCOUNTER — ONCOLOGY VISIT (OUTPATIENT)
Dept: ONCOLOGY | Facility: CLINIC | Age: 48
End: 2023-06-16
Attending: CLINICAL NURSE SPECIALIST
Payer: COMMERCIAL

## 2023-06-16 VITALS
DIASTOLIC BLOOD PRESSURE: 85 MMHG | HEART RATE: 65 BPM | WEIGHT: 139.1 LBS | BODY MASS INDEX: 25.44 KG/M2 | TEMPERATURE: 98.3 F | OXYGEN SATURATION: 100 % | SYSTOLIC BLOOD PRESSURE: 133 MMHG | RESPIRATION RATE: 12 BRPM

## 2023-06-16 DIAGNOSIS — Z80.3 FAMILY HISTORY OF MALIGNANT NEOPLASM OF BREAST: ICD-10-CM

## 2023-06-16 DIAGNOSIS — Z15.02 BRCA2 GENE MUTATION POSITIVE IN FEMALE: ICD-10-CM

## 2023-06-16 DIAGNOSIS — Z15.01 BRCA2 GENE MUTATION POSITIVE IN FEMALE: ICD-10-CM

## 2023-06-16 DIAGNOSIS — Z15.09 BRCA2 GENE MUTATION POSITIVE IN FEMALE: ICD-10-CM

## 2023-06-16 DIAGNOSIS — R92.30 DENSE BREAST: ICD-10-CM

## 2023-06-16 DIAGNOSIS — Z12.39 BREAST CANCER SCREENING, HIGH RISK PATIENT: Primary | ICD-10-CM

## 2023-06-16 DIAGNOSIS — Z12.39 BREAST CANCER SCREENING, HIGH RISK PATIENT: ICD-10-CM

## 2023-06-16 PROCEDURE — 77063 BREAST TOMOSYNTHESIS BI: CPT | Mod: GC | Performed by: RADIOLOGY

## 2023-06-16 PROCEDURE — 77067 SCR MAMMO BI INCL CAD: CPT | Mod: GC | Performed by: RADIOLOGY

## 2023-06-16 PROCEDURE — 99212 OFFICE O/P EST SF 10 MIN: CPT | Performed by: CLINICAL NURSE SPECIALIST

## 2023-06-16 PROCEDURE — 99214 OFFICE O/P EST MOD 30 MIN: CPT | Performed by: CLINICAL NURSE SPECIALIST

## 2023-06-16 ASSESSMENT — PAIN SCALES - GENERAL: PAINLEVEL: NO PAIN (0)

## 2023-06-16 NOTE — NURSING NOTE
"Oncology Rooming Note    June 16, 2023 9:44 AM   Debbie Baker is a 47 year old female who presents for:    Chief Complaint   Patient presents with     Oncology Clinic Visit     Breast cancer screening, high risk patient (Primary Dx); BRCA2 gene mutation positive in female; Dense breast; Family history of malignant neoplasm of breast      Initial Vitals: /85   Pulse 65   Temp 98.3  F (36.8  C) (Oral)   Resp 12   Wt 63.1 kg (139 lb 1.6 oz)   LMP 08/24/2020 (Approximate)   SpO2 100%   BMI 25.44 kg/m   Estimated body mass index is 25.44 kg/m  as calculated from the following:    Height as of 6/8/23: 1.575 m (5' 2\").    Weight as of this encounter: 63.1 kg (139 lb 1.6 oz). Body surface area is 1.66 meters squared.  No Pain (0) Comment: Data Unavailable   Patient's last menstrual period was 08/24/2020 (approximate).  Allergies reviewed: Yes  Medications reviewed: Yes    Medications: Medication refills not needed today.  Pharmacy name entered into [employer]:    [facility] - [facility] [place] - [address]  [facility] - [place], [place] - [address] [place] [address]    Clinical concerns:  none      Poppy Choudhury            "

## 2023-06-16 NOTE — PROGRESS NOTES
Oncology Risk Management Consultation:  Date on this visit: 2023    Debbie Baker returns to the Cancer Risk Management Program today at the [facility] for follow up.  She requires screening and surveillance to minimize her risk of cancer secondary to a deleterious BRCA2 mutation.    She is considered to be at high risk for hereditary breast and ovarian cancer but has mitigatedher risk by having a risk reducing salpingo oophorectomy in 2019.     Primary Physician: Zelda Arambula      History Of Present Illness:  Ms. Baker is a very pleasant, healthy 47 year old female who presents with BRCA2+ associated Hereditary Breast and Ovarian Cancer Syndrome.       Genetic testin2016 - POSITIVE for a BRCA2 mutation, specifically [id number], found on single site analysis genetic testing through [facility]. The testing was done because of a known genetic mutation in the family.     Pertinent history:  Menarche at age 11  First child at age 30  Hx of breastfeeding x 6-8 months  Breast density: heterogeneously fibroglandular tissue.  9/15/2019- Prophylactic Bilateral salpingectomy and oophorectomy.   Pathology:  Ovaries with corpus lutea   - Benign cortical inclusion cysts   - Fallopian tubes with no significant histologic abnormality   - Negative for malignancy or premalignant lesions  UTERUS IS INTACT.  Reports a 15-20 year history of oral contraceptive use and short term use of Nuvaring and Provera.  Her only past history with breast problems was an episode of mastitis when she was breastfeeding her children  History of one breast biopsy - fibroadenoma. No atypia or malignancy.     Screening history:  2014, Screening, mammogram, BiRads1.   3 - Screening mammogram, BiRads1  2016 - Breast MRI, BiRads1.  3/10/2017 - Screening tomosynthesis mammogram, BiRads1.  9/15/2017 - Breast MRI, BiRads1.  3 - Screening tomosynthesis mammogram, BiRads1.  2018 - Breast MRI,  BiRads1.  4/12/2019- Screening tomosynthesis mammogram, BiRads1  10/24/2019- Breast MRI, BiRads4 for mass in right breast  10/31/2019- US core needle biopsy, right breast, Pathology: fibroadenoma  11/4/2020- Breast MRI, BiRads2  8/27/2021- Screening tomosynthesis mammogram, BiRads1  11/9/2022- Breast MRI, BiRads2    5/10/2023- US of abdomen and pelvis for abdominal pain: FINDINGS:      Fluid: No evidence of ascites or pleural effusions.   Liver: The liver demonstrates normal echotexture, measuring 15 cm in  craniocaudal dimension. Anechoic observation in the left lobe  measuring 0.5 x 0.6 x 0.3 cm, compatible with simple cyst. Main portal  vein patent with flow to the liver.     Gallbladder: There is no wall thickening, pericholecystic fluid,  positive sonographic Mccallum's sign or evidence for cholelithiasis.  Nondependent projection from the anterior wall representing 0.2 x 0.2  x 0.1 cm, favored to represent small polyp.     Bile Ducts: Both the intra- and extrahepatic biliary system are of  normal caliber.  The common bile duct measures 4 mm in diameter.     Pancreas: Visualized portions of the head and body of the pancreas are  unremarkable. Pancreas is partially obscured     Kidney: The right kidney measures 10.3 cm long. There is no  hydronephrosis or hydroureter, no shadowing renal calculi, cystic  lesion or mass.      Proximal aorta measures 2.2 cm (within normal limits).     Limited focused sonographic evaluation of the area of pain in the right lower abdomen was unremarkable.                                                   IMPRESSION:   1. Subcentimeter left lobe of liver simple cyst.  2. 0.2 cm projection from the nondependent gallbladder wall favor tiny polyp.  3. Limited focused sonographic evaluation of the area of pain in the  right lower abdomen was unremarkable.    5/17/2023- CT of abdomen and pelvis for abdominal pain: Abdomen and pelvis: Subcentimeter fluid attenuation cyst in hepatic  segment 2  corresponding to that seen on comparison ultrasound. No  appreciable focal suspicious hepatic lesion on these noncontrast  images. Normal gallbladder, biliary tree, pancreas, spleen, adrenal  glands, and renal cortices. No hydronephrosis, hydroureter, or urinary  tract stone. The uterus is normal. Surgical changes of bilateral  salpingo-oophorectomy. No free fluid or free air. No bowel obstruction  or inflammation. Mild to moderate colonic stool burden. Normal  appendix. No abdominal or pelvic lymphadenopathy.     Lung bases:  Clear.  Bones and soft tissues: No acute or aggressive osseus abnormality.IMPRESSION: No acute intra-abdominal pathology.      At this visit, she denies new fatigue, breast pain, asymmetry, lumps, masses, thickening, nipple discharge and skin changes in her breasts.    Past Medical/Surgical History:  Past Medical History:   Diagnosis Date     Allergic rhinitis      At high risk for breast cancer 01/12/2016    BRCA2+     At risk for cancer 01/12/2016    at risk for ovarian/fallopian tube cancer, BRCA2+     BRCA2 positive 01/12/2016    [id number]; single site analysis through [facility]     Depression      Depressive disorder 2011    seasonal depression     Mastitis in female 2010     Past Surgical History:   Procedure Laterality Date     CARPAL TUNNEL RELEASE RT/LT Bilateral     2018     CYSTOSCOPY, SLING TRANSVAGINAL N/A 9/15/2020    Procedure: Midurethral sling and cystoscopy;  Surgeon: Pamela Chilel MD;  Location: [facility] OR     GENITOURINARY SURGERY  9/15/2020    bladder mesh     LAPAROSCOPIC SALPINGO-OOPHORECTOMY Bilateral 9/15/2020    Procedure: laparoscopic bilateral salpingo-oophorectomy, pelvic washings;  Surgeon: Celeste Murillo MD;  Location: [facility] OR     SOFT TISSUE SURGERY  12/2018    bilateral carpal tunnel release     wisdom teeth         Allergies:  Allergies as of 06/16/2023 - Reviewed 06/16/2023   Allergen Reaction Noted     Dust mites Other (See Comments)  2016       Current Medications:  Current Outpatient Medications   Medication Sig Dispense Refill     buPROPion (WELLBUTRIN SR) 200 MG 12 hr tablet Take 1 tablet (200 mg) by mouth 2 times daily 180 tablet 3     Cholecalciferol (VITAMIN D) 2000 UNITS tablet Take 2,000 Units by mouth every morning        estradiol (ESTRACE) 0.1 MG/GM vaginal cream Place 1 gram vaginally 3 times per week 42.5 g 4     fluticasone (FLONASE) 50 MCG/ACT nasal spray Spray 1 spray into both nostrils as needed        ibuprofen (ADVIL/MOTRIN) 600 MG tablet Take 1 tablet (600 mg) by mouth every 6 hours as needed for moderate pain       Krill Oil 1000 MG CAPS        Multiple Vitamin (M.V.I. ADULT IV) Take 1 tablet by mouth every morning        propranolol (INDERAL) 10 MG tablet TAKE 1 TABLET BY MOUTH TWICE DAILY AS NEEDED FOR PRESENTATIONS 60 tablet 1     tolterodine (DETROL) 1 MG tablet Take 1 tablet (1 mg) by mouth 2 times daily 180 tablet 3     tretinoin (RETIN-A) 0.05 % external cream Apply topically nightly as needed (for acne) 45 g 3        Family History:  Family History   Problem Relation Age of Onset     Hypertension Mother      Depression Mother      Anxiety Disorder Mother      Hypertension Father      Hearing Loss Father      Valvular heart disease Father         s/p mitral valve replacement     Cerebrovascular Disease Father         TIAs     No Known Problems Sister      No Known Problems Brother      Ovarian Cancer Maternal Grandmother 59         at 61     Pancreatic Cancer Maternal Grandfather 87         at 87     Substance Abuse Maternal Grandfather         alcohol     Skin Cancer Paternal Grandmother 88     Bladder Cancer Paternal Grandfather 60         at 80     Cancer Paternal Grandfather         Bladder     Breast Cancer Maternal Aunt 51        BRCA2+       Social History:  Social History     Socioeconomic History     Marital status:      Spouse name: Tavares     Number of children: 2     Years of  education: Not on file     Highest education level: Not on file   Occupational History     Occupation: unemployed   Tobacco Use     Smoking status: Never     Smokeless tobacco: Never   Vaping Use     Vaping Use: Never used   Substance and Sexual Activity     Alcohol use: Yes     Comment: 2 drink/week     Drug use: No     Sexual activity: Yes     Partners: Male     Birth control/protection: Post-menopausal     Comment: salpingo oophorectomy   Other Topics Concern      Service Not Asked     Blood Transfusions Not Asked     Caffeine Concern No     Occupational Exposure No     Hobby Hazards No     Sleep Concern No     Stress Concern Yes     Weight Concern No     Special Diet No     Back Care No     Exercise Yes     Comment: works out with , does yoga 3-4x/week     Bike Helmet Not Asked     Seat Belt Yes     Self-Exams Yes     Parent/sibling w/ CABG, MI or angioplasty before 65F 55M? No   Social History Narrative     Not on file     Social Determinants of Health     Financial Resource Strain: Not on file   Food Insecurity: Not on file   Transportation Needs: Not on file   Physical Activity: Not on file   Stress: Not on file   Social Connections: Not on file   Intimate Partner Violence: Not At Risk (6/16/2023)    Humiliation, Afraid, Rape, and Kick questionnaire      Fear of Current or Ex-Partner: No      Emotionally Abused: No      Physically Abused: No      Sexually Abused: No   Housing Stability: Not on file       Physical Exam:  /85   Pulse 65   Temp 98.3  F (36.8  C) (Oral)   Resp 12   Wt 63.1 kg (139 lb 1.6 oz)   LMP 08/24/2020 (Approximate)   SpO2 100%   BMI 25.44 kg/m      GENERAL APPEARANCE: healthy, alert and no distress     NECK: no adenopathy, no asymmetry or masses     LYMPHATICS: No cervical, supraclavicular or axillary lymphadenopathy     RESP: lungs clear to auscultation - no rales, rhonchi or wheezes  CARDIOVASCULAR: regular rate and rhythm, normal S1 S2, no S3 or S4  and no murmur.   BREASTS: Examined in a sitting and lying position. Symmetrical without visible distortion, swelling or rashes. No nipple inversion, nipple discharge, breast dimpling or puckering. Breast tissue is heterogenous dense to palpation.  Axillary area without masses or lymphadenopathy.  SKIN: no suspicious lesions or rashes    Laboratory/Imaging Studies  Results for orders placed or performed in visit on 06/16/23   MA Screening Bilateral w/ Kemar     Status: None    Narrative    BILATERAL FULL FIELD DIGITAL SCREENING MAMMOGRAM WITH TOMOSYNTHESIS    Performed on: 6/16/23    Compared to: 08/27/2021, 04/12/2019, and 03/30/2018    Technique:  This study was evaluated with the assistance of Computer-Aided   Detection.  Breast Tomosynthesis was used in interpretation.    Findings: The breasts are heterogeneously dense, which may obscure small   masses.  There is no radiographic evidence of malignancy.     Impression    IMPRESSION: ACR BI-RADS Category 1: Negative    RECOMMENDED FOLLOW-UP: Annual routine screening mammogram    The results and recommendations of this examination will be communicated   to the patient.    I have personally reviewed the examination and initial interpretation  and I agree with the findings.      Dion Washburn MD; April Nicolas MD         ASSESSMENT  Debbie is doing well today and up to date on her screenings.  We will focus on breast screening although she is considering whether she would have a prophylactic mastectomy in the future. Research does show that it is most effective prior to age 50 and she may consider it in the next couple of years.  There are no changes to her family history or her own health status.      She has no complaints today and will proceed as follows.     Individualized Surveillance Plan for women  Hereditary Breast and/or Ovarian Cancer Syndrome   Per NCCN Guidelines Version 3.2023   Recommended screening Test or procedure Last done Next Scheduled     Breast self awareness starting at age 18.    Breast cancer risk >60% Women should be familiar with their breasts and promptly report changes to their care provider. Periodic, consistent self exam may facilitate breast self awareness. Premenopausal women may find self exams to be most informative when performed at the end of menses.   6/16/2023 June 2024   Breast screening, starting at age 25 Clinical breast exams every 6 -12 months 6/16/2023 June 2024   Breast screening   Age 25-29 Annual breast MRI screening with contrast (or mammogram if MRI is unavailable) or individualized based on family history if a breast cancer diagnosis before age 30 is present.       Breast MRI is performed preferably on day 7-15 of menstrual cycle for premenopausal women.     See below     See below   Breast screening   Age >30-75 years     Annual mammogram (consider tomosynthesis mammogram) and annual screening MRI.     Breast MRI is performed preferably on day 7-15 of menstrual cycle for premenopausal women.    Age>75 years, management should be considered on an individual basis.   8/27/2021- Screening tomosynthesis mammogram, BiRads1    11/9/2022- Breast MRI, BiRads2   Mammogram after visit today    Breast MRI in December    Return in one year with mammogram after visit   Ovarian cancer screening, starting at age 30-35    Absolute risk for epithelial ovarian cancer -39-58% for BRCA1+ carriers and 13-29% for BRCA2+ carriers   Consider transvaginal ultrasound and  tests.   NA   NA   Pancreatic Cancer Screening beginning at age 50 or 10 years prior to the earliest pancreatic cancer on the BRCA-side of the family  In families with exocrine pancreatic cancer in a first or second degree relative    Risk is <5% for BRCA1+ carriers    5-10% for BRCA2+ carriers     Consider Annual MRI/MRCP and/or Endoscopic Ultrasound   5/2023- CT of abdomen and pelvis, no findings   Maternal grandfathr with pancreatic cancer at later age    Recommendation: risk-reducing salpingo-oophorectomy(RRSO), typically between 35 and 40 years old and  upon completion of child bearing.     Because ovarian cancer onset in patients with BRCA2 mutations is on average of 8-10 years later than in patients with BRCA1 mutations, it is reasonable to delay RRSO until 40-45 years in patients with BRCA2 mutations  unless age at diagnosis in the family warrants earlier age for consideration for prophylactic surgery.    Limited data suggest that there may be a slightly increased risk of serous uterine cancer among women with BRCA1+ pathogenic variants. The provider and the patient should discuss the risks and benefits of a concurrent hysterectomy at the time of RRSO for women with a BRCA1+ pathogenic variant /like pathogenic variant prior to surgery.     Salpingectomy alone is not the standard of care for risk reduction although clinical trials are ongoing.     Discuss option of risk-reducing mastectomy.    Consider risks and benefits of risk reducing agents, such as tamoxifen and raloxifene for breast and ovarian cancer.    Consider a full body skin and eye exam for melanoma screening for both BRCA1+ and BRCA2+.     NOTE: Women with BRCA mutation who are treated for breast cancer should have screening of the remaining breast tissue with annual mammography and breast MRI.     I spent a total of 35 minutes on the day of the visit. Please see the note for further information on patient assessment and treatment.    Meliza Najera, APRN-CNS, OCN, AGN-BC  Clinical Nurse Specialist  Cancer Risk Management Program  [place]    CC: No Ref-Primary, Physician

## 2023-06-16 NOTE — PATIENT INSTRUCTIONS
Individualized Surveillance Plan for women  Hereditary Breast and/or Ovarian Cancer Syndrome   Per NCCN Guidelines Version 3.2023   Recommended screening Test or procedure Last done Next Scheduled    Breast self awareness starting at age 18.    Breast cancer risk >60% Women should be familiar with their breasts and promptly report changes to their care provider. Periodic, consistent self exam may facilitate breast self awareness. Premenopausal women may find self exams to be most informative when performed at the end of menses.   6/16/2023 June 2024   Breast screening, starting at age 25 Clinical breast exams every 6 -12 months 6/16/2023 June 2024   Breast screening   Age 25-29 Annual breast MRI screening with contrast (or mammogram if MRI is unavailable) or individualized based on family history if a breast cancer diagnosis before age 30 is present.       Breast MRI is performed preferably on day 7-15 of menstrual cycle for premenopausal women.     See below     See below   Breast screening   Age >30-75 years     Annual mammogram (consider tomosynthesis mammogram) and annual screening MRI.     Breast MRI is performed preferably on day 7-15 of menstrual cycle for premenopausal women.    Age>75 years, management should be considered on an individual basis.   8/27/2021- Screening tomosynthesis mammogram, BiRads1    11/9/2022- Breast MRI, BiRads2   Mammogram after visit today    Breast MRI in December    Return in one year with mammogram after visit   Ovarian cancer screening, starting at age 30-35    Absolute risk for epithelial ovarian cancer -39-58% for BRCA1+ carriers and 13-29% for BRCA2+ carriers   Consider transvaginal ultrasound and  tests.   NA   NA   Pancreatic Cancer Screening beginning at age 50 or 10 years prior to the earliest pancreatic cancer on the BRCA-side of the family  In families with exocrine pancreatic cancer in a first or second degree relative    Risk is <5% for BRCA1+  carriers    5-10% for BRCA2+ carriers     Consider Annual MRI/MRCP and/or Endoscopic Ultrasound   5/2023- CT of abdomen and pelvis, no findings   Maternal grandfather with pancreatic cancer at later age   Recommendation: risk-reducing salpingo-oophorectomy(RRSO), typically between 35 and 40 years old and  upon completion of child bearing.     Because ovarian cancer onset in patients with BRCA2 mutations is on average of 8-10 years later than in patients with BRCA1 mutations, it is reasonable to delay RRSO until 40-45 years in patients with BRCA2 mutations  unless age at diagnosis in the family warrants earlier age for consideration for prophylactic surgery.    Limited data suggest that there may be a slightly increased risk of serous uterine cancer among women with BRCA1+ pathogenic variants. The provider and the patient should discuss the risks and benefits of a concurrent hysterectomy at the time of RRSO for women with a BRCA1+ pathogenic variant /like pathogenic variant prior to surgery.     Salpingectomy alone is not the standard of care for risk reduction although clinical trials are ongoing.     Discuss option of risk-reducing mastectomy.    Consider risks and benefits of risk reducing agents, such as tamoxifen and raloxifene for breast and ovarian cancer.    Consider a full body skin and eye exam for melanoma screening for both BRCA1+ and BRCA2+.     NOTE: Women with BRCA mutation who are treated for breast cancer should have screening of the remaining breast tissue with annual mammography and breast MRI.

## 2023-06-16 NOTE — LETTER
2023         RE: Debbie Baker  [address]   [place] [address]        Dear Colleague,    Thank you for referring your patient, Debbie Baker, to the [facility]. Please see a copy of my visit note below.    Oncology Risk Management Consultation:  Date on this visit: 2023    Debbie Baker returns to the Cancer Risk Management Program today at the [facility] for follow up.  She requires screening and surveillance to minimize her risk of cancer secondary to a deleterious BRCA2 mutation.    She is considered to be at high risk for hereditary breast and ovarian cancer but has mitigatedher risk by having a risk reducing salpingo oophorectomy in 2019.     Primary Physician: Zelda Arambula      History Of Present Illness:  Ms. Baker is a very pleasant, healthy 47 year old female who presents with BRCA2+ associated Hereditary Breast and Ovarian Cancer Syndrome.       Genetic testin2016 - POSITIVE for a BRCA2 mutation, specifically [id number], found on single site analysis genetic testing through [facility]. The testing was done because of a known genetic mutation in the family.     Pertinent history:  Menarche at age 11  First child at age 30  Hx of breastfeeding x 6-8 months  Breast density: heterogeneously fibroglandular tissue.  9/15/2019- Prophylactic Bilateral salpingectomy and oophorectomy.   Pathology:  Ovaries with corpus lutea   - Benign cortical inclusion cysts   - Fallopian tubes with no significant histologic abnormality   - Negative for malignancy or premalignant lesions  UTERUS IS INTACT.  Reports a 15-20 year history of oral contraceptive use and short term use of Nuvaring and Provera.  Her only past history with breast problems was an episode of mastitis when she was breastfeeding her children  History of one breast biopsy - fibroadenoma. No atypia or malignancy.     Screening history:  2014, Screening, mammogram, BiRads1.    3/11/2016 - Screening mammogram, BiRads1  9/9/2016 - Breast MRI, BiRads1.  3/10/2017 - Screening tomosynthesis mammogram, BiRads1.  9/15/2017 - Breast MRI, BiRads1.  3/30/2018 - Screening tomosynthesis mammogram, BiRads1.  9/28/2018 - Breast MRI, BiRads1.  4/12/2019- Screening tomosynthesis mammogram, BiRads1  10/24/2019- Breast MRI, BiRads4 for mass in right breast  10/31/2019- US core needle biopsy, right breast, Pathology: fibroadenoma  11/4/2020- Breast MRI, BiRads2  8/27/2021- Screening tomosynthesis mammogram, BiRads1  11/9/2022- Breast MRI, BiRads2    5/10/2023- US of abdomen and pelvis for abdominal pain: FINDINGS:      Fluid: No evidence of ascites or pleural effusions.   Liver: The liver demonstrates normal echotexture, measuring 15 cm in  craniocaudal dimension. Anechoic observation in the left lobe  measuring 0.5 x 0.6 x 0.3 cm, compatible with simple cyst. Main portal  vein patent with flow to the liver.     Gallbladder: There is no wall thickening, pericholecystic fluid,  positive sonographic Mccallum's sign or evidence for cholelithiasis.  Nondependent projection from the anterior wall representing 0.2 x 0.2  x 0.1 cm, favored to represent small polyp.     Bile Ducts: Both the intra- and extrahepatic biliary system are of  normal caliber.  The common bile duct measures 4 mm in diameter.     Pancreas: Visualized portions of the head and body of the pancreas are  unremarkable. Pancreas is partially obscured     Kidney: The right kidney measures 10.3 cm long. There is no  hydronephrosis or hydroureter, no shadowing renal calculi, cystic  lesion or mass.      Proximal aorta measures 2.2 cm (within normal limits).     Limited focused sonographic evaluation of the area of pain in the right lower abdomen was unremarkable.                                                   IMPRESSION:   1. Subcentimeter left lobe of liver simple cyst.  2. 0.2 cm projection from the nondependent gallbladder wall favor tiny  polyp.  3. Limited focused sonographic evaluation of the area of pain in the  right lower abdomen was unremarkable.    5/17/2023- CT of abdomen and pelvis for abdominal pain: Abdomen and pelvis: Subcentimeter fluid attenuation cyst in hepatic  segment 2 corresponding to that seen on comparison ultrasound. No  appreciable focal suspicious hepatic lesion on these noncontrast  images. Normal gallbladder, biliary tree, pancreas, spleen, adrenal  glands, and renal cortices. No hydronephrosis, hydroureter, or urinary  tract stone. The uterus is normal. Surgical changes of bilateral  salpingo-oophorectomy. No free fluid or free air. No bowel obstruction  or inflammation. Mild to moderate colonic stool burden. Normal  appendix. No abdominal or pelvic lymphadenopathy.     Lung bases:  Clear.  Bones and soft tissues: No acute or aggressive osseus abnormality.IMPRESSION: No acute intra-abdominal pathology.      At this visit, she denies new fatigue, breast pain, asymmetry, lumps, masses, thickening, nipple discharge and skin changes in her breasts.    Past Medical/Surgical History:  Past Medical History:   Diagnosis Date     Allergic rhinitis      At high risk for breast cancer 01/12/2016    BRCA2+     At risk for cancer 01/12/2016    at risk for ovarian/fallopian tube cancer, BRCA2+     BRCA2 positive 01/12/2016    [id number]; single site analysis through [employer]     Depression      Depressive disorder 2011    seasonal depression     Mastitis in female 2010     Past Surgical History:   Procedure Laterality Date     CARPAL TUNNEL RELEASE RT/LT Bilateral     2018     CYSTOSCOPY, SLING TRANSVAGINAL N/A 9/15/2020    Procedure: Midurethral sling and cystoscopy;  Surgeon: Pamela Chilel MD;  Location: [facility] OR     GENITOURINARY SURGERY  9/15/2020    bladder mesh     LAPAROSCOPIC SALPINGO-OOPHORECTOMY Bilateral 9/15/2020    Procedure: laparoscopic bilateral salpingo-oophorectomy, pelvic washings;  Surgeon: Lidia  Celeste Hernandez MD;  Location: [facility] OR     SOFT TISSUE SURGERY  2018    bilateral carpal tunnel release     wisdom teeth         Allergies:  Allergies as of 2023 - Reviewed 2023   Allergen Reaction Noted     Dust mites Other (See Comments) 2016       Current Medications:  Current Outpatient Medications   Medication Sig Dispense Refill     buPROPion (WELLBUTRIN SR) 200 MG 12 hr tablet Take 1 tablet (200 mg) by mouth 2 times daily 180 tablet 3     Cholecalciferol (VITAMIN D) 2000 UNITS tablet Take 2,000 Units by mouth every morning        estradiol (ESTRACE) 0.1 MG/GM vaginal cream Place 1 gram vaginally 3 times per week 42.5 g 4     fluticasone (FLONASE) 50 MCG/ACT nasal spray Spray 1 spray into both nostrils as needed        ibuprofen (ADVIL/MOTRIN) 600 MG tablet Take 1 tablet (600 mg) by mouth every 6 hours as needed for moderate pain       Krill Oil 1000 MG CAPS        Multiple Vitamin (M.V.I. ADULT IV) Take 1 tablet by mouth every morning        propranolol (INDERAL) 10 MG tablet TAKE 1 TABLET BY MOUTH TWICE DAILY AS NEEDED FOR PRESENTATIONS 60 tablet 1     tolterodine (DETROL) 1 MG tablet Take 1 tablet (1 mg) by mouth 2 times daily 180 tablet 3     tretinoin (RETIN-A) 0.05 % external cream Apply topically nightly as needed (for acne) 45 g 3        Family History:  Family History   Problem Relation Age of Onset     Hypertension Mother      Depression Mother      Anxiety Disorder Mother      Hypertension Father      Hearing Loss Father      Valvular heart disease Father         s/p mitral valve replacement     Cerebrovascular Disease Father         TIAs     No Known Problems Sister      No Known Problems Brother      Ovarian Cancer Maternal Grandmother 59         at 61     Pancreatic Cancer Maternal Grandfather 87         at 87     Substance Abuse Maternal Grandfather         alcohol     Skin Cancer Paternal Grandmother 88     Bladder Cancer Paternal Grandfather 60         at 80      Cancer Paternal Grandfather         Bladder     Breast Cancer Maternal Aunt 51        BRCA2+       Social History:  Social History     Socioeconomic History     Marital status:      Spouse name: Tavares     Number of children: 2     Years of education: Not on file     Highest education level: Not on file   Occupational History     Occupation: unemployed   Tobacco Use     Smoking status: Never     Smokeless tobacco: Never   Vaping Use     Vaping Use: Never used   Substance and Sexual Activity     Alcohol use: Yes     Comment: 2 drink/week     Drug use: No     Sexual activity: Yes     Partners: Male     Birth control/protection: Post-menopausal     Comment: salpingo oophorectomy   Other Topics Concern      Service Not Asked     Blood Transfusions Not Asked     Caffeine Concern No     Occupational Exposure No     Hobby Hazards No     Sleep Concern No     Stress Concern Yes     Weight Concern No     Special Diet No     Back Care No     Exercise Yes     Comment: works out with , does yoga 3-4x/week     Bike Helmet Not Asked     Seat Belt Yes     Self-Exams Yes     Parent/sibling w/ CABG, MI or angioplasty before 65F 55M? No   Social History Narrative     Not on file     Social Determinants of Health     Financial Resource Strain: Not on file   Food Insecurity: Not on file   Transportation Needs: Not on file   Physical Activity: Not on file   Stress: Not on file   Social Connections: Not on file   Intimate Partner Violence: Not At Risk (6/16/2023)    Humiliation, Afraid, Rape, and Kick questionnaire      Fear of Current or Ex-Partner: No      Emotionally Abused: No      Physically Abused: No      Sexually Abused: No   Housing Stability: Not on file       Physical Exam:  /85   Pulse 65   Temp 98.3  F (36.8  C) (Oral)   Resp 12   Wt 63.1 kg (139 lb 1.6 oz)   LMP 08/24/2020 (Approximate)   SpO2 100%   BMI 25.44 kg/m      GENERAL APPEARANCE: healthy, alert and no distress     NECK: no  adenopathy, no asymmetry or masses     LYMPHATICS: No cervical, supraclavicular or axillary lymphadenopathy     RESP: lungs clear to auscultation - no rales, rhonchi or wheezes  CARDIOVASCULAR: regular rate and rhythm, normal S1 S2, no S3 or S4 and no murmur.   BREASTS: Examined in a sitting and lying position. Symmetrical without visible distortion, swelling or rashes. No nipple inversion, nipple discharge, breast dimpling or puckering. Breast tissue is heterogenous dense to palpation.  Axillary area without masses or lymphadenopathy.  SKIN: no suspicious lesions or rashes    Laboratory/Imaging Studies  Results for orders placed or performed in visit on 06/16/23   MA Screening Bilateral w/ Kemar     Status: None    Narrative    BILATERAL FULL FIELD DIGITAL SCREENING MAMMOGRAM WITH TOMOSYNTHESIS    Performed on: 6/16/23    Compared to: 08/27/2021, 04/12/2019, and 03/30/2018    Technique:  This study was evaluated with the assistance of Computer-Aided   Detection.  Breast Tomosynthesis was used in interpretation.    Findings: The breasts are heterogeneously dense, which may obscure small   masses.  There is no radiographic evidence of malignancy.     Impression    IMPRESSION: ACR BI-RADS Category 1: Negative    RECOMMENDED FOLLOW-UP: Annual routine screening mammogram    The results and recommendations of this examination will be communicated   to the patient.    I have personally reviewed the examination and initial interpretation  and I agree with the findings.      Dion Washburn MD; April Nicolas MD         ASSESSMENT  Debbie is doing well today and up to date on her screenings.  We will focus on breast screening although she is considering whether she would have a prophylactic mastectomy in the future. Research does show that it is most effective prior to age 50 and she may consider it in the next couple of years.  There are no changes to her family history or her own health status.      She has no  complaints today and will proceed as follows.     Individualized Surveillance Plan for women  Hereditary Breast and/or Ovarian Cancer Syndrome   Per NCCN Guidelines Version 3.2023   Recommended screening Test or procedure Last done Next Scheduled    Breast self awareness starting at age 18.    Breast cancer risk >60% Women should be familiar with their breasts and promptly report changes to their care provider. Periodic, consistent self exam may facilitate breast self awareness. Premenopausal women may find self exams to be most informative when performed at the end of menses.   6/16/2023 June 2024   Breast screening, starting at age 25 Clinical breast exams every 6 -12 months 6/16/2023 June 2024   Breast screening   Age 25-29 Annual breast MRI screening with contrast (or mammogram if MRI is unavailable) or individualized based on family history if a breast cancer diagnosis before age 30 is present.       Breast MRI is performed preferably on day 7-15 of menstrual cycle for premenopausal women.     See below     See below   Breast screening   Age >30-75 years     Annual mammogram (consider tomosynthesis mammogram) and annual screening MRI.     Breast MRI is performed preferably on day 7-15 of menstrual cycle for premenopausal women.    Age>75 years, management should be considered on an individual basis.   8/27/2021- Screening tomosynthesis mammogram, BiRads1    11/9/2022- Breast MRI, BiRads2   Mammogram after visit today    Breast MRI in December    Return in one year with mammogram after visit   Ovarian cancer screening, starting at age 30-35    Absolute risk for epithelial ovarian cancer -39-58% for BRCA1+ carriers and 13-29% for BRCA2+ carriers   Consider transvaginal ultrasound and  tests.   NA   NA   Pancreatic Cancer Screening beginning at age 50 or 10 years prior to the earliest pancreatic cancer on the BRCA-side of the family  In families with exocrine pancreatic cancer in a first or second degree  relative    Risk is <5% for BRCA1+ carriers    5-10% for BRCA2+ carriers     Consider Annual MRI/MRCP and/or Endoscopic Ultrasound   5/2023- CT of abdomen and pelvis, no findings   Maternal grandfathr with pancreatic cancer at later age   Recommendation: risk-reducing salpingo-oophorectomy(RRSO), typically between 35 and 40 years old and  upon completion of child bearing.     Because ovarian cancer onset in patients with BRCA2 mutations is on average of 8-10 years later than in patients with BRCA1 mutations, it is reasonable to delay RRSO until 40-45 years in patients with BRCA2 mutations  unless age at diagnosis in the family warrants earlier age for consideration for prophylactic surgery.    Limited data suggest that there may be a slightly increased risk of serous uterine cancer among women with BRCA1+ pathogenic variants. The provider and the patient should discuss the risks and benefits of a concurrent hysterectomy at the time of RRSO for women with a BRCA1+ pathogenic variant /like pathogenic variant prior to surgery.     Salpingectomy alone is not the standard of care for risk reduction although clinical trials are ongoing.     Discuss option of risk-reducing mastectomy.    Consider risks and benefits of risk reducing agents, such as tamoxifen and raloxifene for breast and ovarian cancer.    Consider a full body skin and eye exam for melanoma screening for both BRCA1+ and BRCA2+.     NOTE: Women with BRCA mutation who are treated for breast cancer should have screening of the remaining breast tissue with annual mammography and breast MRI.     I spent a total of 35 minutes on the day of the visit. Please see the note for further information on patient assessment and treatment.    Mleiza Najera, HENRIQUE-CNS, OCN, AGN-BC  Clinical Nurse Specialist  Cancer Risk Management Program  [employer] [place]    CC: No Ref-Primary, Physician

## 2023-07-27 ENCOUNTER — NURSE TRIAGE (OUTPATIENT)
Dept: FAMILY MEDICINE | Facility: CLINIC | Age: 48
End: 2023-07-27
Payer: COMMERCIAL

## 2023-07-27 DIAGNOSIS — U07.1 INFECTION DUE TO 2019 NOVEL CORONAVIRUS: Primary | ICD-10-CM

## 2023-07-27 NOTE — TELEPHONE ENCOUNTER
RN COVID TREATMENT VISIT  07/27/23      The patient has been triaged and does not require a higher level of care.    Debbie Baker  47 year old  Current weight? 139 lbs    Has the patient been seen by a primary care provider at an [facility] or [facility] within the past two years? Yes.   Have you been in close proximity to/do you have a known exposure to a person with a confirmed case of influenza? No.     General treatment eligibility:  Date of positive COVID test (PCR or at home)?  7/26/23    Are you or have you been hospitalized for this COVID-19 infection? No.   Have you received monoclonal antibodies or antiviral treatment for COVID-19 since this positive test? No.   Do you have any of the following conditions that place you at risk of being very sick from COVID-19?   - Mental health disorders including mood disorders, depression, schizophrenia spectrum disorders   Yes, patient has at least one high risk condition as noted above.     Current COVID symptoms:   - cough  - fatigue  - sore throat  - congestion or runny nose  Yes. Patient has at least one symptom as selected.     How many days since symptoms started? 5 days or less. Established patient, 12 years or older weighing at least 88.2 lbs, who has symptoms that started in the past 5 days, has not been hospitalized nor received treatment already, and is at risk for being very sick from COVID-19.     Treatment eligibility by RN:  Are you currently pregnant or nursing? No  Do you have a clinically significant hypersensitivity to nirmatrelvir or ritonavir, or toxic epidermal necrolysis (TEN) or Pride-Luis Antonio Syndrome? No  Do you have a history of hepatitis, any hepatic impairment on the Problem List (such as Child-Alamo Class C, cirrhosis, fatty liver disease, alcoholic liver disease), or was the last liver lab (hepatic panel, ALT, AST, ALK Phos, bilirubin) elevated in the past 6 months? No  Do you have any history of severe renal  impairment (eGFR < 30mL/min)? No    Is patient eligible to continue? Yes, patient meets all eligibility requirements for the RN COVID treatment (as denoted by all no responses above).     Current Outpatient Medications   Medication Sig Dispense Refill    buPROPion (WELLBUTRIN SR) 200 MG 12 hr tablet Take 1 tablet (200 mg) by mouth 2 times daily 180 tablet 3    Cholecalciferol (VITAMIN D) 2000 UNITS tablet Take 2,000 Units by mouth every morning       estradiol (ESTRACE) 0.1 MG/GM vaginal cream Place 1 gram vaginally 3 times per week 42.5 g 4    fluticasone (FLONASE) 50 MCG/ACT nasal spray Spray 1 spray into both nostrils as needed       ibuprofen (ADVIL/MOTRIN) 600 MG tablet Take 1 tablet (600 mg) by mouth every 6 hours as needed for moderate pain      Krill Oil 1000 MG CAPS       Multiple Vitamin (M.V.I. ADULT IV) Take 1 tablet by mouth every morning       propranolol (INDERAL) 10 MG tablet TAKE 1 TABLET BY MOUTH TWICE DAILY AS NEEDED FOR PRESENTATIONS 60 tablet 1    tolterodine (DETROL) 1 MG tablet Take 1 tablet (1 mg) by mouth 2 times daily 180 tablet 3    tretinoin (RETIN-A) 0.05 % external cream Apply topically nightly as needed (for acne) 45 g 3       Medications from List 1 of the standing order (on medications that exclude the use of Paxlovid) that patient is taking: NONE. Is patient taking Antler's Wort? No  Is patient taking Master's Wort or any meds from List 1? No.   Medications from List 2 of the standing order (on meds that provider needs to adjust) that patient is taking: NONE. Is patient on any of the meds from List 2? No.   Medications from List 3 of standing order (on meds that a RN needs to adjust) that patient is taking: bupropion (Wellbutrin, Zyban): Instructed patient to continue taking buproprion as directed but know that it may have less effect while taking Paxlovid.  Is patient on any meds from List 3? Yes. Patient is on meds from list 3. No meds require a provider visit and at least one  med required RN to adjust.     Paxlovid has an approximate 90% reduction in hospitalization. Paxlovid can possibly cause altered sense of taste, diarrhea (loose, watery stools), high blood pressure, muscle aches.     Would patient like a Paxlovid prescription?   Yes.   Lab Results   Component Value Date    GFRESTIMATED 80 06/08/2023       Was last eGFR reduced? No, eGFR 60 or greater/ No Result on record. Patient can receive the normal renal function dose. Paxlovid Rx sent to [facility]   [facility]    Temporary change to home medications: None    All medication adjustments (holds, etc) were discussed with the patient and patient was asked to repeat back (teachback) their med adjustment.  Did patient understand med adjustment? Yes, patient repeated back and understood correctly.        Reviewed the following instructions with the patient:    Paxlovid (nimatrelvir and ritonavir)    How it works  Two medicines (nirmatrelvir and ritonavir) are taken together. They stop the virus from growing. Less amount of virus is easier for your body to fight.    How to take  Medicine comes in a daily container with both medicine tablets. Take by mouth twice daily (once in the morning, once at night) for 5 days.  The number of tablets to take varies by patient.  Don't chew or break capsules. Swallow whole.    When to take  Take as soon as possible after positive COVID-19 test result, and within 5 days of your first symptoms.    Possible side effects  Can cause altered sense of taste, diarrhea (loose, watery stools), high blood pressure, muscle aches.    MARLENA Alanis RN BSN  [facility]      Reason for Disposition   [1] COVID-19 diagnosed by positive lab test (e.g., PCR, rapid self-test kit) AND [2] mild symptoms (e.g., cough, fever, others) AND [3] no complications or SOB    Additional Information   Negative: SEVERE difficulty breathing (e.g., struggling  for each breath, speaks in single words)   Negative: Difficult to awaken or acting confused (e.g., disoriented, slurred speech)   Negative: Bluish (or gray) lips or face now   Negative: Shock suspected (e.g., cold/pale/clammy skin, too weak to stand, low BP, rapid pulse)   Negative: Sounds like a life-threatening emergency to the triager   Negative: SEVERE or constant chest pain or pressure  (Exception: Mild central chest pain, present only when coughing.)   Negative: MODERATE difficulty breathing (e.g., speaks in phrases, SOB even at rest, pulse 100-120)   Negative: [1] Headache AND [2] stiff neck (can't touch chin to chest)   Negative: Oxygen level (e.g., pulse oximetry) 90 percent or lower   Negative: Chest pain or pressure   Negative: Patient sounds very sick or weak to the triager   Negative: MILD difficulty breathing (e.g., minimal/no SOB at rest, SOB with walking, pulse <100)   Negative: Fever > 103 F (39.4 C)   Negative: [1] Fever > 101 F (38.3 C) AND [2] age > 60 years   Negative: [1] Fever > 100.0 F (37.8 C) AND [2] bedridden (e.g., nursing home patient, CVA, chronic illness, recovering from surgery)   Negative: [1] HIGH RISK for severe COVID complications (e.g., weak immune system, age > 64 years, obesity with BMI > 25, pregnant, chronic lung disease or other chronic medical condition) AND [2] COVID symptoms (e.g., cough, fever)  (Exceptions: Already seen by PCP and no new or worsening symptoms.)   Negative: [1] HIGH RISK patient AND [2] influenza is widespread in the community AND [3] ONE OR MORE respiratory symptoms: cough, sore throat, runny or stuffy nose   Negative: [1] HIGH RISK patient AND [2] influenza exposure within the last 7 days AND [3] ONE OR MORE respiratory symptoms: cough, sore throat, runny or stuffy nose   Negative: Oxygen level (e.g., pulse oximetry) 91 to 94 percent   Negative: Fever present > 3 days (72 hours)   Negative: [1] Fever returns after gone for over 24 hours AND [2]  symptoms worse or not improved   Negative: [1] Continuous (nonstop) coughing interferes with work or school AND [2] no improvement using cough treatment per Care Advice   Negative: [1] COVID-19 infection suspected by caller or triager AND [2] mild symptoms (cough, fever, or others) AND [3] negative COVID-19 rapid test   Negative: Cough present > 3 weeks    Protocols used: Coronavirus (COVID-19) Diagnosed or [id number]

## 2023-07-27 NOTE — TELEPHONE ENCOUNTER
[facility] COVID hub    Pt was COVID positive last night and symptoms started 7/25/23. Pt interested in treatment    Fanny Ramirez RN

## 2023-09-13 ENCOUNTER — E-VISIT (OUTPATIENT)
Dept: FAMILY MEDICINE | Facility: CLINIC | Age: 48
End: 2023-09-13
Payer: COMMERCIAL

## 2023-09-13 DIAGNOSIS — R35.0 URINARY FREQUENCY: Primary | ICD-10-CM

## 2023-09-13 PROCEDURE — 99421 OL DIG E/M SVC 5-10 MIN: CPT | Performed by: STUDENT IN AN ORGANIZED HEALTH CARE EDUCATION/TRAINING PROGRAM

## 2023-09-13 RX ORDER — NITROFURANTOIN 25; 75 MG/1; MG/1
100 CAPSULE ORAL 2 TIMES DAILY
Qty: 10 CAPSULE | Refills: 0 | Status: SHIPPED | OUTPATIENT
Start: 2023-09-13 | End: 2023-09-18

## 2023-09-13 NOTE — PATIENT INSTRUCTIONS
Hi Debbie,     It sounds like you likely have a urinary tract infection, which is a common infection of the bladder with bacteria.  This is not a sexually transmitted infection, though urinating immediately after intercourse can help prevent infections.  Drinking lots of fluids is also helpful to clear your current infection and prevent the next one.      If you are able, I would like you schedule a lab only appointment to give a urine sample to confirm the UTI. Then I have also  sent a prescription for antibiotics (macrobid) to your pharmacy to treat this infection. Ideally getting the urine sample first would be better for evaluation.     If your symptoms worsen, you develop pain in your back or stomach, develop fevers, or are not improving in 5 days, please contact your primary care provider for an appointment or visit any of our convenient Walk-in or Urgent Care Centers to be seen, which can be found on our website here.    Let me know if you have any questions.     Stacie Be, DO

## 2023-09-14 ENCOUNTER — LAB (OUTPATIENT)
Dept: LAB | Facility: CLINIC | Age: 48
End: 2023-09-14
Payer: COMMERCIAL

## 2023-09-14 DIAGNOSIS — R35.0 URINARY FREQUENCY: ICD-10-CM

## 2023-09-14 LAB
ALBUMIN UR-MCNC: NEGATIVE MG/DL
APPEARANCE UR: CLEAR
BACTERIA #/AREA URNS HPF: ABNORMAL /HPF
BILIRUB UR QL STRIP: NEGATIVE
COLOR UR AUTO: YELLOW
GLUCOSE UR STRIP-MCNC: NEGATIVE MG/DL
HGB UR QL STRIP: NEGATIVE
KETONES UR STRIP-MCNC: 15 MG/DL
LEUKOCYTE ESTERASE UR QL STRIP: ABNORMAL
NITRATE UR QL: POSITIVE
PH UR STRIP: 6 [PH] (ref 5–7)
RBC #/AREA URNS AUTO: ABNORMAL /HPF
SP GR UR STRIP: 1.01 (ref 1–1.03)
SQUAMOUS #/AREA URNS AUTO: ABNORMAL /LPF
UROBILINOGEN UR STRIP-ACNC: 0.2 E.U./DL
WBC #/AREA URNS AUTO: ABNORMAL /HPF

## 2023-09-14 PROCEDURE — 81001 URINALYSIS AUTO W/SCOPE: CPT

## 2023-09-14 PROCEDURE — 87086 URINE CULTURE/COLONY COUNT: CPT

## 2023-09-16 LAB — BACTERIA UR CULT: NO GROWTH

## 2024-01-07 ENCOUNTER — ANCILLARY PROCEDURE (OUTPATIENT)
Dept: MRI IMAGING | Facility: CLINIC | Age: 49
End: 2024-01-07
Attending: CLINICAL NURSE SPECIALIST
Payer: COMMERCIAL

## 2024-01-07 DIAGNOSIS — Z80.3 FAMILY HISTORY OF MALIGNANT NEOPLASM OF BREAST: ICD-10-CM

## 2024-01-07 DIAGNOSIS — R92.30 DENSE BREAST: ICD-10-CM

## 2024-01-07 DIAGNOSIS — Z15.01 BRCA2 GENE MUTATION POSITIVE IN FEMALE: ICD-10-CM

## 2024-01-07 DIAGNOSIS — Z12.39 BREAST CANCER SCREENING, HIGH RISK PATIENT: ICD-10-CM

## 2024-01-07 DIAGNOSIS — Z15.09 BRCA2 GENE MUTATION POSITIVE IN FEMALE: ICD-10-CM

## 2024-01-07 DIAGNOSIS — Z15.02 BRCA2 GENE MUTATION POSITIVE IN FEMALE: ICD-10-CM

## 2024-01-07 PROCEDURE — A9585 GADOBUTROL INJECTION: HCPCS

## 2024-01-07 PROCEDURE — 77049 MRI BREAST C-+ W/CAD BI: CPT

## 2024-01-07 RX ORDER — GADOBUTROL 604.72 MG/ML
7.5 INJECTION INTRAVENOUS ONCE
Status: COMPLETED | OUTPATIENT
Start: 2024-01-07 | End: 2024-01-07

## 2024-01-07 RX ADMIN — GADOBUTROL 7.5 ML: 604.72 INJECTION INTRAVENOUS at 09:00

## 2024-01-22 ENCOUNTER — E-VISIT (OUTPATIENT)
Dept: URGENT CARE | Facility: CLINIC | Age: 49
End: 2024-01-22
Payer: COMMERCIAL

## 2024-01-22 ENCOUNTER — LAB (OUTPATIENT)
Dept: LAB | Facility: CLINIC | Age: 49
End: 2024-01-22
Payer: COMMERCIAL

## 2024-01-22 DIAGNOSIS — R30.0 DYSURIA: Primary | ICD-10-CM

## 2024-01-22 DIAGNOSIS — N39.0 ACUTE UTI: Primary | ICD-10-CM

## 2024-01-22 DIAGNOSIS — R30.0 DYSURIA: ICD-10-CM

## 2024-01-22 LAB
ALBUMIN UR-MCNC: NEGATIVE MG/DL
APPEARANCE UR: ABNORMAL
BACTERIA #/AREA URNS HPF: ABNORMAL /HPF
BILIRUB UR QL STRIP: NEGATIVE
COLOR UR AUTO: YELLOW
GLUCOSE UR STRIP-MCNC: NEGATIVE MG/DL
HGB UR QL STRIP: ABNORMAL
KETONES UR STRIP-MCNC: NEGATIVE MG/DL
LEUKOCYTE ESTERASE UR QL STRIP: ABNORMAL
NITRATE UR QL: NEGATIVE
PH UR STRIP: 6 [PH] (ref 5–7)
RBC #/AREA URNS AUTO: ABNORMAL /HPF
SP GR UR STRIP: 1.01 (ref 1–1.03)
SQUAMOUS #/AREA URNS AUTO: ABNORMAL /LPF
UROBILINOGEN UR STRIP-ACNC: 0.2 E.U./DL
WBC #/AREA URNS AUTO: ABNORMAL /HPF

## 2024-01-22 PROCEDURE — 99421 OL DIG E/M SVC 5-10 MIN: CPT | Performed by: PREVENTIVE MEDICINE

## 2024-01-22 PROCEDURE — 87186 SC STD MICRODIL/AGAR DIL: CPT

## 2024-01-22 PROCEDURE — 87086 URINE CULTURE/COLONY COUNT: CPT

## 2024-01-22 PROCEDURE — 81001 URINALYSIS AUTO W/SCOPE: CPT

## 2024-01-22 RX ORDER — NITROFURANTOIN 25; 75 MG/1; MG/1
100 CAPSULE ORAL 2 TIMES DAILY
Qty: 10 CAPSULE | Refills: 0 | Status: SHIPPED | OUTPATIENT
Start: 2024-01-22 | End: 2024-01-27

## 2024-01-22 NOTE — PATIENT INSTRUCTIONS
Dear Debbie Baker,     After reviewing your responses, I would like you to come in for a urine test to make sure we treat you correctly. This urine test is to evaluate you for a possible urinary tract infection, and should be scheduled for today or tomorrow. Schedule a Lab Only appointment here.     Lab appointments are not available at most locations on the weekends. If no Lab Only appointment is available, you should be seen in any of our convenient Walk-in or Urgent Care Centers, which can be found on our website here.     You will receive instructions with your results in [employer] once they are available.     If your symptoms worsen, you develop pain in your back or stomach, develop fevers, or are not improving in 5 days, please contact your primary care provider for an appointment or visit a Walk-in or Urgent Care Center to be seen.     Thanks again for choosing us as your health care partner,     Joel Salmon MD, MD  Urinary Tract Infection (UTI) in Women: Care Instructions  Overview     A urinary tract infection, or UTI, is a general term for an infection anywhere between the kidneys and the urethra (where urine comes out). Most UTIs are bladder infections. They often cause pain or burning when you urinate.  UTIs are caused by bacteria and can be cured with antibiotics. Be sure to complete your treatment so that the infection does not get worse.  Follow-up care is a key part of your treatment and safety. Be sure to make and go to all appointments, and call your doctor if you are having problems. It's also a good idea to know your test results and keep a list of the medicines you take.  How can you care for yourself at home?  Take your antibiotics as directed. Do not stop taking them just because you feel better. You need to take the full course of antibiotics.  Drink extra water and other fluids for the next day or two. This will help make the urine less concentrated and help wash out the  "bacteria that are causing the infection. (If you have kidney, heart, or liver disease and have to limit fluids, talk with your doctor before you increase the amount of fluids you drink.)  Avoid drinks that are carbonated or have caffeine. They can irritate the bladder.  Urinate often. Try to empty your bladder each time.  To relieve pain, take a hot bath or lay a heating pad set on low over your lower belly or genital area. Never go to sleep with a heating pad in place.  To prevent UTIs  Drink plenty of water each day. This helps you urinate often, which clears bacteria from your system. (If you have kidney, heart, or liver disease and have to limit fluids, talk with your doctor before you increase the amount of fluids you drink.)  Urinate when you need to.  If you are sexually active, urinate right after you have sex.  Change sanitary pads often.  Avoid douches, bubble baths, feminine hygiene sprays, and other feminine hygiene products that have deodorants.  After going to the bathroom, wipe from front to back.  When should you call for help?   Call your doctor now or seek immediate medical care if:    Symptoms such as fever, chills, nausea, or vomiting get worse or appear for the first time.     You have new pain in your back just below your rib cage. This is called flank pain.     There is new blood or pus in your urine.     You have any problems with your antibiotic medicine.   Watch closely for changes in your health, and be sure to contact your doctor if:    You are not getting better after taking an antibiotic for 2 days.     Your symptoms go away but then come back.   Where can you learn more?  Go to https://www.[email or web address].net/patiented  Enter K848 in the search box to learn more about \"Urinary Tract Infection (UTI) in Women: Care Instructions.\"  Current as of: February 28, 2023               Content Version: 13.8    7924-6300 [employer]   Care instructions adapted under license by your " healthcare professional. If you have questions about a medical condition or this instruction, always ask your healthcare professional. [employer] disclaims any warranty or liability for your use of this information.

## 2024-01-23 LAB — BACTERIA UR CULT: ABNORMAL

## 2024-01-26 ENCOUNTER — E-VISIT (OUTPATIENT)
Dept: URGENT CARE | Facility: CLINIC | Age: 49
End: 2024-01-26
Payer: COMMERCIAL

## 2024-01-26 ENCOUNTER — NURSE TRIAGE (OUTPATIENT)
Dept: NURSING | Facility: CLINIC | Age: 49
End: 2024-01-26

## 2024-01-26 DIAGNOSIS — B37.9 ANTIBIOTIC-INDUCED YEAST INFECTION: ICD-10-CM

## 2024-01-26 DIAGNOSIS — T36.95XA ANTIBIOTIC-INDUCED YEAST INFECTION: ICD-10-CM

## 2024-01-26 DIAGNOSIS — N39.0 ACUTE UTI (URINARY TRACT INFECTION): Primary | ICD-10-CM

## 2024-01-26 PROCEDURE — 99207 PR NON-BILLABLE SERV PER CHARTING: CPT | Performed by: NURSE PRACTITIONER

## 2024-01-26 RX ORDER — FLUCONAZOLE 150 MG/1
150 TABLET ORAL ONCE
Qty: 1 TABLET | Refills: 0 | Status: SHIPPED | OUTPATIENT
Start: 2024-01-26 | End: 2024-01-26

## 2024-01-26 RX ORDER — SULFAMETHOXAZOLE/TRIMETHOPRIM 800-160 MG
1 TABLET ORAL 2 TIMES DAILY
Qty: 6 TABLET | Refills: 0 | Status: SHIPPED | OUTPATIENT
Start: 2024-01-26 | End: 2024-01-29

## 2024-01-27 NOTE — TELEPHONE ENCOUNTER
Nurse Triage SBAR    Is this a 2nd Level Triage? NO    Situation: Urinary Symptoms    Background: :Patient just completed 5 day course of Macrobid today to treat for UTI.     Assessment: Patient calling to report that she is leaving for the airport at 0500, she is still having burning and urgency. She denies fever, urinary retention, or hematuria.    Protocol Recommended Disposition:   According to the protocol, patient should see provider within 24 hours.  Care advice given. Patient verbalizes understanding and agrees with plan of care. Patient plans to set up E-Visit.    Shonda Lynn RN  01/26/24 10:15 PM  [facility] Nurse Advisor    Reason for Disposition   [1] Taking antibiotic > 72 hours (3 days) for UTI AND [2] painful urination or frequency is SAME (unchanged, not better)    Additional Information   Negative: Shock suspected (e.g., cold/pale/clammy skin, too weak to stand, low BP, rapid pulse)   Negative: Sounds like a life-threatening emergency to the triager   Negative: Urinary tract infection suspected, but not taking antibiotics   Negative: [1] Unable to urinate (or only a few drops) > 4 hours AND [2] bladder feels very full (e.g., palpable bladder or strong urge to urinate)   Negative: Passing pure blood or large blood clots (i.e., size > a dime)  (Exceptions: Flecks, small strands, or pinkish-red color)   Negative: Fever > 103 F (39.4 C)   Negative: Patient sounds very sick or weak to the triager   Negative: [1] SEVERE pain (e.g., excruciating) AND [2] no improvement 2 hours after pain medications   Negative: [1] Fever > 100.0 F (37.8 C) AND [2] new-onset since starting antibiotics   Negative: [1] Side (flank) or lower back pain AND [2] new-onset since starting antibiotics   Negative: [1] Taking antibiotic > 24 hours for UTI AND [2] flank or lower back pain getting WORSE   Negative: [1] Vomiting 2 or more times AND [2] interferes with taking oral antibiotic   Negative: [1] Taking antibiotic  > 24 hours for UTI (urinary tract or bladder infection) AND [2] fever persists (still has fever)    Protocols used: Urinary Tract Infection on Antibiotic Follow-up Call - Female-A-

## 2024-01-27 NOTE — PATIENT INSTRUCTIONS
Dear Debbie Baker    After reviewing your responses, I've been able to diagnose you with a urinary tract infection, which is a common infection of the bladder with bacteria.  This is not a sexually transmitted infection, though urinating immediately after intercourse can help prevent infections.  Drinking lots of fluids is also helpful to clear your current infection and prevent the next one.      I have sent a prescription for antibiotics to your pharmacy to treat this infection.    It is important that you take all of your prescribed medication even if your symptoms are improving after a few doses.  Taking all of your medicine helps prevent the symptoms from returning.     If your symptoms worsen, you develop pain in your back or stomach, develop fevers, or are not improving in 5 days, please contact your primary care provider for an appointment or visit any of our convenient Walk-in or Urgent Care Centers to be seen, which can be found on our website here.    Thanks again for choosing us as your health care partner,    Kaylynn Vasquez CNP    According to the urine culture the antibiotic you were on should have taken care of you UTI.   I will send in a new rx for a different antibiotic as well as an rx for Diflucan incase this is a yeast infection that sometimes causes UTI like symptoms.

## 2024-02-09 ENCOUNTER — LAB (OUTPATIENT)
Dept: LAB | Facility: CLINIC | Age: 49
End: 2024-02-09
Payer: COMMERCIAL

## 2024-02-09 ENCOUNTER — E-VISIT (OUTPATIENT)
Dept: FAMILY MEDICINE | Facility: CLINIC | Age: 49
End: 2024-02-09
Payer: COMMERCIAL

## 2024-02-09 DIAGNOSIS — N30.90 CYSTITIS: ICD-10-CM

## 2024-02-09 DIAGNOSIS — N30.90 CYSTITIS: Primary | ICD-10-CM

## 2024-02-09 LAB
ALBUMIN UR-MCNC: NEGATIVE MG/DL
APPEARANCE UR: CLEAR
BILIRUB UR QL STRIP: NEGATIVE
COLOR UR AUTO: YELLOW
GLUCOSE UR STRIP-MCNC: NEGATIVE MG/DL
HGB UR QL STRIP: NEGATIVE
KETONES UR STRIP-MCNC: ABNORMAL MG/DL
LEUKOCYTE ESTERASE UR QL STRIP: NEGATIVE
NITRATE UR QL: NEGATIVE
PH UR STRIP: 6 [PH] (ref 5–7)
SP GR UR STRIP: 1.02 (ref 1–1.03)
UROBILINOGEN UR STRIP-ACNC: 0.2 E.U./DL

## 2024-02-09 PROCEDURE — 99421 OL DIG E/M SVC 5-10 MIN: CPT | Performed by: STUDENT IN AN ORGANIZED HEALTH CARE EDUCATION/TRAINING PROGRAM

## 2024-02-09 PROCEDURE — 81003 URINALYSIS AUTO W/O SCOPE: CPT

## 2024-02-09 RX ORDER — NITROFURANTOIN 25; 75 MG/1; MG/1
100 CAPSULE ORAL 2 TIMES DAILY
Qty: 10 CAPSULE | Refills: 0 | Status: SHIPPED | OUTPATIENT
Start: 2024-02-09 | End: 2024-02-14

## 2024-03-18 ENCOUNTER — VIRTUAL VISIT (OUTPATIENT)
Dept: FAMILY MEDICINE | Facility: CLINIC | Age: 49
End: 2024-03-18
Payer: COMMERCIAL

## 2024-03-18 ENCOUNTER — LAB (OUTPATIENT)
Dept: LAB | Facility: CLINIC | Age: 49
End: 2024-03-18
Payer: COMMERCIAL

## 2024-03-18 DIAGNOSIS — G47.00 INSOMNIA, UNSPECIFIED TYPE: ICD-10-CM

## 2024-03-18 DIAGNOSIS — N95.1 SYMPTOMATIC MENOPAUSAL OR FEMALE CLIMACTERIC STATES: ICD-10-CM

## 2024-03-18 DIAGNOSIS — Z15.01 BRCA2 GENE MUTATION POSITIVE IN FEMALE: ICD-10-CM

## 2024-03-18 DIAGNOSIS — R30.0 DYSURIA: Primary | ICD-10-CM

## 2024-03-18 DIAGNOSIS — Z15.02 BRCA2 GENE MUTATION POSITIVE IN FEMALE: ICD-10-CM

## 2024-03-18 DIAGNOSIS — R30.0 DYSURIA: ICD-10-CM

## 2024-03-18 DIAGNOSIS — F33.8 SEASONAL AFFECTIVE DISORDER (H): ICD-10-CM

## 2024-03-18 DIAGNOSIS — R41.89 BRAIN FOG: ICD-10-CM

## 2024-03-18 DIAGNOSIS — Z15.09 BRCA2 GENE MUTATION POSITIVE IN FEMALE: ICD-10-CM

## 2024-03-18 LAB
ALBUMIN UR-MCNC: NEGATIVE MG/DL
APPEARANCE UR: CLEAR
BACTERIA #/AREA URNS HPF: ABNORMAL /HPF
BILIRUB UR QL STRIP: NEGATIVE
COLOR UR AUTO: YELLOW
GLUCOSE UR STRIP-MCNC: NEGATIVE MG/DL
HGB UR QL STRIP: ABNORMAL
KETONES UR STRIP-MCNC: NEGATIVE MG/DL
LEUKOCYTE ESTERASE UR QL STRIP: ABNORMAL
NITRATE UR QL: NEGATIVE
PH UR STRIP: 6 [PH] (ref 5–7)
RBC #/AREA URNS AUTO: ABNORMAL /HPF
SP GR UR STRIP: 1.01 (ref 1–1.03)
UROBILINOGEN UR STRIP-ACNC: 0.2 E.U./DL
WBC #/AREA URNS AUTO: ABNORMAL /HPF

## 2024-03-18 PROCEDURE — 81001 URINALYSIS AUTO W/SCOPE: CPT

## 2024-03-18 PROCEDURE — 99214 OFFICE O/P EST MOD 30 MIN: CPT | Mod: 95 | Performed by: STUDENT IN AN ORGANIZED HEALTH CARE EDUCATION/TRAINING PROGRAM

## 2024-03-18 PROCEDURE — 87088 URINE BACTERIA CULTURE: CPT

## 2024-03-18 PROCEDURE — 87086 URINE CULTURE/COLONY COUNT: CPT

## 2024-03-18 ASSESSMENT — ENCOUNTER SYMPTOMS: FREQUENCY: 1

## 2024-03-18 ASSESSMENT — PATIENT HEALTH QUESTIONNAIRE - PHQ9
10. IF YOU CHECKED OFF ANY PROBLEMS, HOW DIFFICULT HAVE THESE PROBLEMS MADE IT FOR YOU TO DO YOUR WORK, TAKE CARE OF THINGS AT HOME, OR GET ALONG WITH OTHER PEOPLE: SOMEWHAT DIFFICULT
SUM OF ALL RESPONSES TO PHQ QUESTIONS 1-9: 11
SUM OF ALL RESPONSES TO PHQ QUESTIONS 1-9: 11

## 2024-03-18 NOTE — PROGRESS NOTES
Debbie is a 48 year old who is being evaluated via a billable video visit.    How would you like to obtain your AVS? MyChart  If the video visit is dropped, the invitation should be resent by: Text to cell phone: [phone]  Will anyone else be joining your video visit? No      Assessment & Plan     Dysuria  Recommend UA and start medications pending UA and culture. Will be leaving the country in 3 days. She can come in today to have this done.   - UA Macroscopic with reflex to Microscopic and Culture - Lab Collect; Future    Symptomatic menopausal or female climacteric states  Brain fog  Insomnia, unspecified type  Seasonal affective disorder (H24)  She is having menopausal symptoms that are becoming more disruptive of daily life including vaginal dryness, insomnia, brain fog, mood changes, occasional hot flashes. Last week restarted estradiol. Recommend continuing this 2-3 times per week and anticipate couple months before seeing response. She is very interested in HRT. Since she is BRCA2 gene positive I am hesitant to prescribe this. We discussed the risks briefly and she would still like to start HRT. I will refer to gynecology menopause clinic for further evaluation of safety/risks/benefits.   - Ob/Gyn  Referral; Future    BRCA2 gene mutation positive in female  Now s/p b/l salpingo-oophorectomy in 12/2020.   - Ob/Gyn  Referral; Future            Subjective   Debbie is a 48 year old, presenting for the following health issues:  Urinary Problem        3/18/2024     9:44 AM   Additional Questions   Accompanied by na         3/18/2024     9:44 AM   Patient Reported Additional Medications   Patient reports taking the following new medications none     History of Present Illness       Reason for visit:  UTI-like symptoms impacting my sleep (I've been waking up nearly every hour to urinate, and probably urinated about 12 times yesterday during the day.)    She eats 2-3 servings of fruits and  vegetables daily.She consumes 0 sweetened beverage(s) daily.She exercises with enough effort to increase her heart rate 30 to 60 minutes per day.  She exercises with enough effort to increase her heart rate 5 days per week.   She is taking medications regularly.   Answers submitted by the patient for this visit:  Painful Urination Questionnaire (Submitted on 3/18/2024)  Chronicity: recurrent  Onset: in the past 7 days  Frequency: intermittently  Progression since onset: gradually worsening  Pain quality: burning  Pain - numeric: 2/10  Fever: no fever  Sexually active?: Yes  History of pyelonephritis?: No  frequency: Yes  urgency: Yes      For the last two nights has been getting up every 1 hour to pee and having urgency with this. Some slight discomfort with urination. No blood in the urine. Some lower back pain for the past week. No upper/mid back pain    Wasn't using estradiol cream regularly but restarted one week ago. Started it again one week ago. Is interested in HRT. Having insomnia, brain fog. Gets occasional hot flashes. Vaginal dryness. Had oophorectomy in December 2020. Had a lot of hot flashes right after. Brain fog is getting worse.     Had urine culture + E coli on 1/22/24, resistant to ampicillin only. Treated with bactrim.  Had symptoms again on 2/9/2024 and UA was negative for infection.  Macrobid was stopped.     Daily goal of 64 oz of water per day         Review of Systems  Constitutional, HEENT, cardiovascular, pulmonary, gi and gu systems are negative, except as otherwise noted.      Objective           Vitals:  No vitals were obtained today due to virtual visit.    Physical Exam   GENERAL: alert and no distress  EYES: Eyes grossly normal to inspection.  No discharge or erythema, or obvious scleral/conjunctival abnormalities.  RESP: No audible wheeze, cough, or visible cyanosis.    SKIN: Visible skin clear. No significant rash, abnormal pigmentation or lesions.  NEURO: Cranial nerves grossly  intact.  Mentation and speech appropriate for age.  PSYCH: Appropriate affect, tone, and pace of words          Video-Visit Details    Type of service:  Video Visit   Originating Location (pt. Location): Home    Distant Location (provider location):  On-site  Platform used for Video Visit: Dominic  Signed Electronically by: Stacie Be DO

## 2024-03-19 ENCOUNTER — MYC MEDICAL ADVICE (OUTPATIENT)
Dept: FAMILY MEDICINE | Facility: CLINIC | Age: 49
End: 2024-03-19
Payer: COMMERCIAL

## 2024-03-19 DIAGNOSIS — N30.00 ACUTE CYSTITIS WITHOUT HEMATURIA: Primary | ICD-10-CM

## 2024-03-20 LAB
BACTERIA UR CULT: ABNORMAL
BACTERIA UR CULT: ABNORMAL

## 2024-03-20 RX ORDER — CEPHALEXIN 500 MG/1
500 CAPSULE ORAL 2 TIMES DAILY
Qty: 14 CAPSULE | Refills: 0 | Status: SHIPPED | OUTPATIENT
Start: 2024-03-20 | End: 2024-03-27

## 2024-03-20 NOTE — TELEPHONE ENCOUNTER
Routing to Provider      Pt urine culture is pos      <10,000 CFU/mL Streptococcus agalactiae (Group B Streptococcus) Abnormal    This organism is susceptible to ampicillin, penicillin, vancomycin and the cephalosporins.         Pt leaving on a flight to [place] tomorrow and would like to start as soon as she can for treatment.    She does still have 5 day course of  Macrobid from Feb 9 when she thought she had a UTI- can she use this to treat?    Also recommended probiotic or greek yogurt      Please leave a message if no answer        MARLENA Agarwal    Triage Nurse  [facility]

## 2024-04-04 ENCOUNTER — E-VISIT (OUTPATIENT)
Dept: FAMILY MEDICINE | Facility: CLINIC | Age: 49
End: 2024-04-04
Payer: COMMERCIAL

## 2024-04-04 ENCOUNTER — LAB (OUTPATIENT)
Dept: LAB | Facility: CLINIC | Age: 49
End: 2024-04-04
Payer: COMMERCIAL

## 2024-04-04 DIAGNOSIS — N30.01 ACUTE CYSTITIS WITH HEMATURIA: ICD-10-CM

## 2024-04-04 DIAGNOSIS — R30.0 DYSURIA: ICD-10-CM

## 2024-04-04 DIAGNOSIS — R30.0 DYSURIA: Primary | ICD-10-CM

## 2024-04-04 LAB
BACTERIA #/AREA URNS HPF: ABNORMAL /HPF
RBC #/AREA URNS AUTO: ABNORMAL /HPF
SQUAMOUS #/AREA URNS AUTO: ABNORMAL /LPF
WBC #/AREA URNS AUTO: ABNORMAL /HPF

## 2024-04-04 PROCEDURE — 87086 URINE CULTURE/COLONY COUNT: CPT

## 2024-04-04 PROCEDURE — 87088 URINE BACTERIA CULTURE: CPT

## 2024-04-04 PROCEDURE — 81015 MICROSCOPIC EXAM OF URINE: CPT

## 2024-04-04 PROCEDURE — 87186 SC STD MICRODIL/AGAR DIL: CPT

## 2024-04-04 PROCEDURE — 99421 OL DIG E/M SVC 5-10 MIN: CPT | Performed by: STUDENT IN AN ORGANIZED HEALTH CARE EDUCATION/TRAINING PROGRAM

## 2024-04-04 RX ORDER — CEFDINIR 300 MG/1
300 CAPSULE ORAL 2 TIMES DAILY
Qty: 14 CAPSULE | Refills: 0 | Status: SHIPPED | OUTPATIENT
Start: 2024-04-04 | End: 2024-04-11

## 2024-04-06 LAB
BACTERIA UR CULT: ABNORMAL
BACTERIA UR CULT: ABNORMAL

## 2024-04-18 ENCOUNTER — MYC MEDICAL ADVICE (OUTPATIENT)
Dept: FAMILY MEDICINE | Facility: CLINIC | Age: 49
End: 2024-04-18

## 2024-04-18 ENCOUNTER — LAB (OUTPATIENT)
Dept: LAB | Facility: CLINIC | Age: 49
End: 2024-04-18
Payer: COMMERCIAL

## 2024-04-18 ENCOUNTER — VIRTUAL VISIT (OUTPATIENT)
Dept: FAMILY MEDICINE | Facility: CLINIC | Age: 49
End: 2024-04-18
Payer: COMMERCIAL

## 2024-04-18 DIAGNOSIS — R39.15 URINARY URGENCY: ICD-10-CM

## 2024-04-18 DIAGNOSIS — R39.15 URINARY URGENCY: Primary | ICD-10-CM

## 2024-04-18 DIAGNOSIS — N39.0 RECURRENT UTI: ICD-10-CM

## 2024-04-18 DIAGNOSIS — R30.0 DYSURIA: ICD-10-CM

## 2024-04-18 DIAGNOSIS — N39.41 URGE INCONTINENCE OF URINE: ICD-10-CM

## 2024-04-18 LAB
ALBUMIN UR-MCNC: NEGATIVE MG/DL
APPEARANCE UR: CLEAR
BACTERIA #/AREA URNS HPF: ABNORMAL /HPF
BILIRUB UR QL STRIP: NEGATIVE
COLOR UR AUTO: YELLOW
GLUCOSE UR STRIP-MCNC: NEGATIVE MG/DL
HGB UR QL STRIP: NEGATIVE
KETONES UR STRIP-MCNC: NEGATIVE MG/DL
LEUKOCYTE ESTERASE UR QL STRIP: ABNORMAL
NITRATE UR QL: POSITIVE
PH UR STRIP: 7 [PH] (ref 5–7)
RBC #/AREA URNS AUTO: ABNORMAL /HPF
SP GR UR STRIP: 1.02 (ref 1–1.03)
SQUAMOUS #/AREA URNS AUTO: ABNORMAL /LPF
UROBILINOGEN UR STRIP-ACNC: 0.2 E.U./DL
WBC #/AREA URNS AUTO: ABNORMAL /HPF

## 2024-04-18 PROCEDURE — 81001 URINALYSIS AUTO W/SCOPE: CPT

## 2024-04-18 PROCEDURE — 87086 URINE CULTURE/COLONY COUNT: CPT

## 2024-04-18 PROCEDURE — 99213 OFFICE O/P EST LOW 20 MIN: CPT | Mod: 95 | Performed by: STUDENT IN AN ORGANIZED HEALTH CARE EDUCATION/TRAINING PROGRAM

## 2024-04-18 PROCEDURE — 87186 SC STD MICRODIL/AGAR DIL: CPT

## 2024-04-18 RX ORDER — CEFPODOXIME PROXETIL 200 MG/1
200 TABLET, FILM COATED ORAL 2 TIMES DAILY
Qty: 14 TABLET | Refills: 0 | Status: SHIPPED | OUTPATIENT
Start: 2024-04-18 | End: 2024-08-23

## 2024-04-18 RX ORDER — CEFPODOXIME PROXETIL 200 MG/1
200 TABLET, FILM COATED ORAL 2 TIMES DAILY
Qty: 14 TABLET | Refills: 0 | Status: SHIPPED | OUTPATIENT
Start: 2024-04-18 | End: 2024-04-18

## 2024-04-18 ASSESSMENT — ENCOUNTER SYMPTOMS
FLANK PAIN: 0
HEMATURIA: 0
NAUSEA: 0
FREQUENCY: 1
CHILLS: 0
SWEATS: 0
VOMITING: 0

## 2024-04-18 NOTE — PROGRESS NOTES
Debbie is a 48 year old who is being evaluated via a billable video visit.    How would you like to obtain your AVS? MyChart  If the video visit is dropped, the invitation should be resent by: Text to cell phone: [phone]  Will anyone else be joining your video visit? No      Assessment & Plan     Urinary urgency  Dysuria  Recommend obtaining UA to eval for acute UTI. Abrupt onset of symptoms 2 nights ago. If UA looks consistent then will treat for suspected UTI while awaiting culture.   - UA Macroscopic with reflex to Microscopic and Culture - Lab Collect; Future    Recurrent UTI  She's had recurrent UTI over the last couple months. Not typically post-coital. Responds well to antibiotics but quickly returns. She is using estrace cream as we discussed menopausal changes may be contributing. Recommend having evaluation by urology due to recurrent UTI. Discussed option to do prophylactic daily antibiotic if urology consult is a long wait.   - Adult Urology  Referral; Future      Subjective   Debbie is a 48 year old, presenting for the following health issues:  UTI        4/18/2024    12:00 PM   Additional Questions   Roomed by pepe ANGELO     Answers submitted by the patient for this visit:  Painful Urination Questionnaire (Submitted on 4/18/2024)  Chronicity: recurrent  Onset: yesterday  Frequency: intermittently  Progression since onset: unchanged  Pain quality: aching  Pain - numeric: 2/10  Fever: no fever  Sexually active?: No  History of pyelonephritis?: No  hematuria: No  chills: No  hesitancy: No  discharge: No  frequency: Yes  nausea: No  flank pain: No  possible pregnancy: No  urgency: Yes  sweats: No  vomiting: No    Genitourinary - Female  Onset/Duration: more than a week  Description:   Painful urination (Dysuria): YES           Frequency: YES  Blood in urine (Hematuria): No  Delay in urine (Hesitency): No  Intensity: mild  Progression of Symptoms:  same  Accompanying Signs &  Symptoms:  Fever/chills: No  Flank pain: No  Nausea and vomiting: No  Vaginal symptoms: none  Abdominal/Pelvic Pain: No  History:   History of frequent UTI s: YES  History of kidney stones: No  Sexually Active: No  Possibility of pregnancy: No  Precipitating or alleviating factors: None  Therapies tried and outcome: Diflucan ended this over the weekend and still having symptoms, wants to see what else we can do.     Finished prescription a few nights ago     Woke up two nights ago and had the urge to go and woke up 5 times over rest of the night. Sudden urge to go. Had more achy sensation at the end     Using estrace cream about 2 times per week.         Review of Systems  Constitutional, HEENT, cardiovascular, pulmonary, gi and gu systems are negative, except as otherwise noted.      Objective           Vitals:  No vitals were obtained today due to virtual visit.    Physical Exam   GENERAL: alert and no distress  EYES: Eyes grossly normal to inspection.  No discharge or erythema, or obvious scleral/conjunctival abnormalities.  RESP: No audible wheeze, cough, or visible cyanosis.    SKIN: Visible skin clear. No significant rash, abnormal pigmentation or lesions.  NEURO: Cranial nerves grossly intact.  Mentation and speech appropriate for age.  PSYCH: Appropriate affect, tone, and pace of words          Video-Visit Details    Type of service:  Video Visit   Originating Location (pt. Location): Home    Distant Location (provider location):  On-site  Platform used for Video Visit: Dominic  Signed Electronically by: Stacie Be DO

## 2024-04-18 NOTE — TELEPHONE ENCOUNTER
RN replied to patient via [employer]. See message for details.     Leland Kelley RN, BSN, PHN  [facility]: [place]

## 2024-04-19 LAB — BACTERIA UR CULT: ABNORMAL

## 2024-04-19 RX ORDER — TOLTERODINE TARTRATE 1 MG/1
1 TABLET, EXTENDED RELEASE ORAL 2 TIMES DAILY
Qty: 180 TABLET | Refills: 0 | Status: SHIPPED | OUTPATIENT
Start: 2024-04-19 | End: 2024-08-28

## 2024-04-19 NOTE — TELEPHONE ENCOUNTER
MEDICAL RECORDS REQUEST   [facility]  Urology Clinic  [address] [place] [address]  PHONE: [phone]  Fax: [phone]        FUTURE VISIT INFORMATION                                                   Debbie Baker : 1975 scheduled for future visit at [facility]    APPOINTMENT INFORMATION:  Date: 2024  Provider:  Pamela Chilel,   Reason for Visit/Diagnosis: recurrent UTI    REFERRAL INFORMATION:  Referring provider:  Stacie Be DO in [facility]      RECORDS REQUESTED FOR VISIT                                                     NOTES  STATUS/DETAILS   OFFICE NOTE from referring provider  yes, 2024, 2024, 3/18/2024,  -- Stacie Be DO in [facility]   OFFICE NOTE from other specialist  yes   MEDICATION LIST  yes   LABS     URINALYSIS (UA)  yes   images  yes, 2023 -- CT ABD PELVIS  05/10/2023 -- US ABD     PRE-VISIT CHECKLIST      Joint diagnostic appointment coordinated correctly          (ensure right order & amount of time) Yes   RECORD COLLECTION COMPLETE Yes

## 2024-04-23 ENCOUNTER — MYC MEDICAL ADVICE (OUTPATIENT)
Dept: FAMILY MEDICINE | Facility: CLINIC | Age: 49
End: 2024-04-23
Payer: COMMERCIAL

## 2024-04-23 DIAGNOSIS — N39.0 RECURRENT UTI: Primary | ICD-10-CM

## 2024-04-23 DIAGNOSIS — G47.00 INSOMNIA, UNSPECIFIED TYPE: ICD-10-CM

## 2024-04-23 DIAGNOSIS — Z15.02 BRCA2 GENE MUTATION POSITIVE IN FEMALE: ICD-10-CM

## 2024-04-23 DIAGNOSIS — Z15.01 BRCA2 GENE MUTATION POSITIVE IN FEMALE: ICD-10-CM

## 2024-04-23 DIAGNOSIS — N95.1 SYMPTOMATIC MENOPAUSAL OR FEMALE CLIMACTERIC STATES: ICD-10-CM

## 2024-04-23 DIAGNOSIS — R41.89 BRAIN FOG: ICD-10-CM

## 2024-04-23 DIAGNOSIS — Z15.09 BRCA2 GENE MUTATION POSITIVE IN FEMALE: ICD-10-CM

## 2024-04-23 NOTE — TELEPHONE ENCOUNTER
Routing My Chart message to PCP to review advise [facility] referral priority?    Pended if agreeable    Leland Kelley, RN, BSN, PHN  [facility]

## 2024-04-24 ENCOUNTER — PRE VISIT (OUTPATIENT)
Dept: UROLOGY | Facility: CLINIC | Age: 49
End: 2024-04-24

## 2024-04-26 ENCOUNTER — TELEPHONE (OUTPATIENT)
Dept: UROLOGY | Facility: CLINIC | Age: 49
End: 2024-04-26

## 2024-04-26 ENCOUNTER — VIRTUAL VISIT (OUTPATIENT)
Dept: UROLOGY | Facility: CLINIC | Age: 49
End: 2024-04-26
Attending: STUDENT IN AN ORGANIZED HEALTH CARE EDUCATION/TRAINING PROGRAM
Payer: COMMERCIAL

## 2024-04-26 VITALS — HEIGHT: 62 IN | BODY MASS INDEX: 25.76 KG/M2 | WEIGHT: 140 LBS

## 2024-04-26 DIAGNOSIS — N39.0 RECURRENT UTI: ICD-10-CM

## 2024-04-26 PROCEDURE — 99203 OFFICE O/P NEW LOW 30 MIN: CPT | Mod: 95 | Performed by: OBSTETRICS & GYNECOLOGY

## 2024-04-26 ASSESSMENT — PAIN SCALES - GENERAL: PAINLEVEL: NO PAIN (0)

## 2024-04-26 NOTE — NURSING NOTE
Is the patient currently in the state of [place]? YES    Visit mode:VIDEO    If the visit is dropped, the patient can be reconnected by: VIDEO VISIT: Send to e-mail at: [email or web address]    Will anyone else be joining the visit? NO  (If patient encounters technical issues they should call [phone]    How would you like to obtain your AVS? MyChart    Are changes needed to the allergy or medication list? No    Are refills needed on medications prescribed by this physician? NO    Reason for visit: Consult    Rosemarie AVILES

## 2024-04-26 NOTE — PROGRESS NOTES
"Virtual Visit Details    Type of service:  Video Visit     Originating Location (pt. Location): Home    Distant Location (provider location):  On-site  Platform used for Video Visit: [employer]      Video-Visit Details    Type of service:  Video Visit    Video Start Time: 1:20 PM    Video End Time: 1:45 pm      Chief complaint: recurrent UTI    Subjective     Debbie Baker is a 48 year old  who presents for a video visit today for recurrent UTI. She says she usually gets severe urgency, and some burning with voids. \"Feels like my urethra is irritated /on edge\" and urine is cloudy. No fevers . No CVA tenderness. See UTI hx below. Most recent infection on 24 was treated with 1 week of cefpodoxime ( vantin). She says these recurrent UTIs started in  and says the only new thing is more menopausal symptoms. She is using estradiol a couple times a week for at least a year but only started using it more consistently since her UTI started. She is not really sexually active due to pain with intercourse likely due to dryness. Surgical hx sig for incontinence sling in .      Urine test history  24 Urine culture grew >100k cfu of ecoli that is resistant to bactrim/gent/augmentin.   24 with 10-50k ecoli and 50.100k cfu group B strep.   3.18.24: <10 k group B strep  24: >100k cfu ecoli, resistant to augmentin  Two other urine cultures in  were all negative    EXAMINATION: CT ABDOMEN PELVIS W/O CONTRAST, 2023   FINDINGS:     Abdomen and pelvis: Subcentimeter fluid attenuation cyst in hepatic  segment 2 corresponding to that seen on comparison ultrasound. No  appreciable focal suspicious hepatic lesion on these noncontrast  images. Normal gallbladder, biliary tree, pancreas, spleen, adrenal  glands, and renal cortices. No hydronephrosis, hydroureter, or urinary  tract stone. The uterus is normal. Surgical changes of bilateral  salpingo-oophorectomy. No free fluid or free air. No bowel " obstruction  or inflammation. Mild to moderate colonic stool burden. Normal  appendix. No abdominal or pelvic lymphadenopathy.     Lung bases:  Clear.     Bones and soft tissues: No acute or aggressive osseus abnormality.                                                                      IMPRESSION: No acute intra-abdominal pathology.         Urinary symptoms  Says she has urgency incontinence which usually gets worse with her UTIs. No stress leaks ( since her TVT surgery in 2020) . No voiding difficulty but says it takes much longer to empty since her sling. No gross hematuria. Has been taking tolteredine when she has urinary urgency    Prolapse symptoms  Denies vaginal bulge, pressure sensation or protrusion.    GI symptoms  Denies chronic diarrhea, constipation (taking miralax). Denies bothersome fecal or flatal incontinence. Denies defecatory difficulties.       Sexual health/Pelvic floor  Not sexually active currently due to pain /dryness. S/p salpingoophorectomy  for BRCA2 positive status    Relevant Medical History:    Diabetes? no  High Blood pressure? no     Recurrent UTIs? yes  Sleep Apnea? no  Obesity? Body mass index is 25.61 kg/m .  History of Blood clots? no  Other medical problems: insomnia    Surgical History:      Past Surgical History:   Procedure Laterality Date    CARPAL TUNNEL RELEASE RT/LT Bilateral     2018    CYSTOSCOPY, SLING TRANSVAGINAL N/A 9/15/2020    Procedure: Midurethral sling and cystoscopy;  Surgeon: Pamela Chilel MD;  Location: [facility] OR    GENITOURINARY SURGERY  9/15/2020    bladder mesh    LAPAROSCOPIC SALPINGO-OOPHORECTOMY Bilateral 9/15/2020    Procedure: laparoscopic bilateral salpingo-oophorectomy, pelvic washings;  Surgeon: Celeste Murillo MD;  Location: [facility] OR    SOFT TISSUE SURGERY  12/2018    bilateral carpal tunnel release    wisdom teeth         OB/Gyn History:  OB History   No obstetric history on file.       Medications/Vitamins/Supplements:   Current  Outpatient Medications   Medication Sig Dispense Refill    buPROPion (WELLBUTRIN SR) 200 MG 12 hr tablet Take 1 tablet (200 mg) by mouth 2 times daily 180 tablet 3    cefpodoxime (VANTIN) 200 MG tablet Take 1 tablet (200 mg) by mouth 2 times daily 14 tablet 0    Cholecalciferol (VITAMIN D) 2000 UNITS tablet Take 2,000 Units by mouth every morning       estradiol (ESTRACE) 0.1 MG/GM vaginal cream Place 1 gram vaginally 3 times per week 42.5 g 4    fluticasone (FLONASE) 50 MCG/ACT nasal spray Spray 1 spray into both nostrils as needed       ibuprofen (ADVIL/MOTRIN) 600 MG tablet Take 1 tablet (600 mg) by mouth every 6 hours as needed for moderate pain      Multiple Vitamin (M.V.I. ADULT IV) Take 1 tablet by mouth every morning       propranolol (INDERAL) 10 MG tablet TAKE 1 TABLET BY MOUTH TWICE DAILY AS NEEDED FOR PRESENTATIONS 60 tablet 1    tolterodine (DETROL) 1 MG tablet Take 1 tablet by mouth twice daily. 180 tablet 0    tretinoin (RETIN-A) 0.05 % external cream Apply topically nightly as needed (for acne) 45 g 3     No current facility-administered medications for this visit.         Medical History:      Past Medical History:   Diagnosis Date    Allergic rhinitis     At high risk for breast cancer 01/12/2016    BRCA2+    At risk for cancer 01/12/2016    at risk for ovarian/fallopian tube cancer, BRCA2+    BRCA2 positive 01/12/2016    [id number]; single site analysis through [employer]    Depression     Depressive disorder 2011    seasonal depression    Mastitis in female 2010     ROS  Social History    Social History     Socioeconomic History    Marital status:      Spouse name: Tavares    Number of children: 2    Years of education: Not on file    Highest education level: Not on file   Occupational History    Occupation: unemployed   Tobacco Use    Smoking status: Never    Smokeless tobacco: Never   Vaping Use    Vaping status: Never Used   Substance and Sexual Activity    Alcohol use: Yes      Comment: 2 drink/week    Drug use: No    Sexual activity: Yes     Partners: Male     Birth control/protection: Post-menopausal     Comment: salpingo oophorectomy   Other Topics Concern     Service Not Asked    Blood Transfusions Not Asked    Caffeine Concern No    Occupational Exposure No    Hobby Hazards No    Sleep Concern No    Stress Concern Yes    Weight Concern No    Special Diet No    Back Care No    Exercise Yes     Comment: works out with , does yoga 3-4x/week    Bike Helmet Not Asked    Seat Belt Yes    Self-Exams Yes    Parent/sibling w/ CABG, MI or angioplasty before 65F 55M? No   Social History Narrative    Not on file     Social Determinants of Health     Financial Resource Strain: Not on file   Food Insecurity: Not on file   Transportation Needs: Not on file   Physical Activity: Not on file   Stress: Not on file   Social Connections: Not on file   Interpersonal Safety: Not At Risk (2023)    Humiliation, Afraid, Rape, and Kick questionnaire     Fear of Current or Ex-Partner: No     Emotionally Abused: No     Physically Abused: No     Sexually Abused: No   Housing Stability: Not on file       Family History  Family History   Problem Relation Age of Onset    Hypertension Mother     Depression Mother     Anxiety Disorder Mother     Hypertension Father     Hearing Loss Father     Valvular heart disease Father         s/p mitral valve replacement    Cerebrovascular Disease Father         TIAs    No Known Problems Sister     No Known Problems Brother     Ovarian Cancer Maternal Grandmother 59         at 61    Pancreatic Cancer Maternal Grandfather 87         at 87    Substance Abuse Maternal Grandfather         alcohol    Skin Cancer Paternal Grandmother 88    Bladder Cancer Paternal Grandfather 60         at 80    Cancer Paternal Grandfather         Bladder    Breast Cancer Maternal Aunt 51        BRCA2+       Allergy    Allergies   Allergen Reactions    Dust Mites  Other (See Comments)       Current Outpatient Medications   Medication Sig Dispense Refill    buPROPion (WELLBUTRIN SR) 200 MG 12 hr tablet Take 1 tablet (200 mg) by mouth 2 times daily 180 tablet 3    cefpodoxime (VANTIN) 200 MG tablet Take 1 tablet (200 mg) by mouth 2 times daily 14 tablet 0    Cholecalciferol (VITAMIN D) 2000 UNITS tablet Take 2,000 Units by mouth every morning       estradiol (ESTRACE) 0.1 MG/GM vaginal cream Place 1 gram vaginally 3 times per week 42.5 g 4    fluticasone (FLONASE) 50 MCG/ACT nasal spray Spray 1 spray into both nostrils as needed       ibuprofen (ADVIL/MOTRIN) 600 MG tablet Take 1 tablet (600 mg) by mouth every 6 hours as needed for moderate pain      Multiple Vitamin (M.V.I. ADULT IV) Take 1 tablet by mouth every morning       propranolol (INDERAL) 10 MG tablet TAKE 1 TABLET BY MOUTH TWICE DAILY AS NEEDED FOR PRESENTATIONS 60 tablet 1    tolterodine (DETROL) 1 MG tablet Take 1 tablet by mouth twice daily. 180 tablet 0    tretinoin (RETIN-A) 0.05 % external cream Apply topically nightly as needed (for acne) 45 g 3     No current facility-administered medications for this visit.       Objective:   Deferred ( virtual visit)     Asssessment and plan  Encounter Diagnosis   Name Primary?    Recurrent UTI      Possible risks: menopause ( s/p BSO for BRCA2). Use Increase vaginal estriol use to 3 times a week  Will bring her in for cystoscopy given hx of sling, and for pelvic exam  Start Laura ( already has this)               I spent a total of 30 minutes on  Video with  Debbie Baker on the date of the encounter in chart review, patient visit, review of tests, documentation and/or discussion with other providers about the issues documented above.

## 2024-04-26 NOTE — TELEPHONE ENCOUNTER
[facility]    Phone Message    May a detailed message be left on voicemail: yes     Reason for Call: Other: pt call to set up procedure, wants to see Chante for this, please call pt     Action Taken: Other: urology    Travel Screening: Not Applicable

## 2024-04-26 NOTE — LETTER
"2024       RE: Debbie Baker  [address]  [facility] [address]     Dear Colleague,    Thank you for referring your patient, Debbie Baker, to the [facility] [place] at [facility]. Please see a copy of my visit note below.    Virtual Visit Details    Type of service:  Video Visit     Originating Location (pt. Location): Home    Distant Location (provider location):  On-site  Platform used for Video Visit: [employer]      Video-Visit Details    Type of service:  Video Visit    Video Start Time: 1:20 PM    Video End Time: 1:45 pm      Chief complaint: recurrent UTI    Subjective     Debbie Baker is a 48 year old  who presents for a video visit today for recurrent UTI. She says she usually gets severe urgency, and some burning with voids. \"Feels like my urethra is irritated /on edge\" and urine is cloudy. No fevers . No CVA tenderness. See UTI hx below. Most recent infection on 24 was treated with 1 week of cefpodoxime ( vantin). She says these recurrent UTIs started in  and says the only new thing is more menopausal symptoms. She is using estradiol a couple times a week for at least a year but only started using it more consistently since her UTI started. She is not really sexually active due to pain with intercourse likely due to dryness. Surgical hx sig for incontinence sling in .      Urine test history  24 Urine culture grew >100k cfu of ecoli that is resistant to bactrim/gent/augmentin.   24 with 10-50k ecoli and 50.100k cfu group B strep.   3.18.24: <10 k group B strep  24: >100k cfu ecoli, resistant to augmentin  Two other urine cultures in  were all negative    EXAMINATION: CT ABDOMEN PELVIS W/O CONTRAST, 2023   FINDINGS:     Abdomen and pelvis: Subcentimeter fluid attenuation cyst in hepatic  segment 2 corresponding to that seen on comparison ultrasound. No  appreciable focal suspicious " hepatic lesion on these noncontrast  images. Normal gallbladder, biliary tree, pancreas, spleen, adrenal  glands, and renal cortices. No hydronephrosis, hydroureter, or urinary  tract stone. The uterus is normal. Surgical changes of bilateral  salpingo-oophorectomy. No free fluid or free air. No bowel obstruction  or inflammation. Mild to moderate colonic stool burden. Normal  appendix. No abdominal or pelvic lymphadenopathy.     Lung bases:  Clear.     Bones and soft tissues: No acute or aggressive osseus abnormality.                                                                      IMPRESSION: No acute intra-abdominal pathology.         Urinary symptoms  Says she has urgency incontinence which usually gets worse with her UTIs. No stress leaks ( since her TVT surgery in 2020) . No voiding difficulty but says it takes much longer to empty since her sling. No gross hematuria. Has been taking tolteredine when she has urinary urgency    Prolapse symptoms  Denies vaginal bulge, pressure sensation or protrusion.    GI symptoms  Denies chronic diarrhea, constipation (taking miralax). Denies bothersome fecal or flatal incontinence. Denies defecatory difficulties.       Sexual health/Pelvic floor  Not sexually active currently due to pain /dryness. S/p salpingoophorectomy  for BRCA2 positive status    Relevant Medical History:    Diabetes? no  High Blood pressure? no     Recurrent UTIs? yes  Sleep Apnea? no  Obesity? Body mass index is 25.61 kg/m .  History of Blood clots? no  Other medical problems: insomnia    Surgical History:      Past Surgical History:   Procedure Laterality Date    CARPAL TUNNEL RELEASE RT/LT Bilateral     2018    CYSTOSCOPY, SLING TRANSVAGINAL N/A 9/15/2020    Procedure: Midurethral sling and cystoscopy;  Surgeon: Pamela Chilel MD;  Location: [facility] OR    GENITOURINARY SURGERY  9/15/2020    bladder mesh    LAPAROSCOPIC SALPINGO-OOPHORECTOMY Bilateral 9/15/2020    Procedure: laparoscopic bilateral  salpingo-oophorectomy, pelvic washings;  Surgeon: Celeste Murillo MD;  Location: [facility] OR    SOFT TISSUE SURGERY  12/2018    bilateral carpal tunnel release    wisdom teeth         OB/Gyn History:  OB History   No obstetric history on file.       Medications/Vitamins/Supplements:   Current Outpatient Medications   Medication Sig Dispense Refill    buPROPion (WELLBUTRIN SR) 200 MG 12 hr tablet Take 1 tablet (200 mg) by mouth 2 times daily 180 tablet 3    cefpodoxime (VANTIN) 200 MG tablet Take 1 tablet (200 mg) by mouth 2 times daily 14 tablet 0    Cholecalciferol (VITAMIN D) 2000 UNITS tablet Take 2,000 Units by mouth every morning       estradiol (ESTRACE) 0.1 MG/GM vaginal cream Place 1 gram vaginally 3 times per week 42.5 g 4    fluticasone (FLONASE) 50 MCG/ACT nasal spray Spray 1 spray into both nostrils as needed       ibuprofen (ADVIL/MOTRIN) 600 MG tablet Take 1 tablet (600 mg) by mouth every 6 hours as needed for moderate pain      Multiple Vitamin (M.V.I. ADULT IV) Take 1 tablet by mouth every morning       propranolol (INDERAL) 10 MG tablet TAKE 1 TABLET BY MOUTH TWICE DAILY AS NEEDED FOR PRESENTATIONS 60 tablet 1    tolterodine (DETROL) 1 MG tablet Take 1 tablet by mouth twice daily. 180 tablet 0    tretinoin (RETIN-A) 0.05 % external cream Apply topically nightly as needed (for acne) 45 g 3     No current facility-administered medications for this visit.         Medical History:      Past Medical History:   Diagnosis Date    Allergic rhinitis     At high risk for breast cancer 01/12/2016    BRCA2+    At risk for cancer 01/12/2016    at risk for ovarian/fallopian tube cancer, BRCA2+    BRCA2 positive 01/12/2016    [id number]; single site analysis through [employer]    Depression     Depressive disorder 2011    seasonal depression    Mastitis in female 2010     ROS  Social History    Social History     Socioeconomic History    Marital status:      Spouse name: Tavares    Number of children:  2    Years of education: Not on file    Highest education level: Not on file   Occupational History    Occupation: unemployed   Tobacco Use    Smoking status: Never    Smokeless tobacco: Never   Vaping Use    Vaping status: Never Used   Substance and Sexual Activity    Alcohol use: Yes     Comment: 2 drink/week    Drug use: No    Sexual activity: Yes     Partners: Male     Birth control/protection: Post-menopausal     Comment: salpingo oophorectomy   Other Topics Concern     Service Not Asked    Blood Transfusions Not Asked    Caffeine Concern No    Occupational Exposure No    Hobby Hazards No    Sleep Concern No    Stress Concern Yes    Weight Concern No    Special Diet No    Back Care No    Exercise Yes     Comment: works out with , does yoga 3-4x/week    Bike Helmet Not Asked    Seat Belt Yes    Self-Exams Yes    Parent/sibling w/ CABG, MI or angioplasty before 65F 55M? No   Social History Narrative    Not on file     Social Determinants of Health     Financial Resource Strain: Not on file   Food Insecurity: Not on file   Transportation Needs: Not on file   Physical Activity: Not on file   Stress: Not on file   Social Connections: Not on file   Interpersonal Safety: Not At Risk (2023)    Humiliation, Afraid, Rape, and Kick questionnaire     Fear of Current or Ex-Partner: No     Emotionally Abused: No     Physically Abused: No     Sexually Abused: No   Housing Stability: Not on file       Family History  Family History   Problem Relation Age of Onset    Hypertension Mother     Depression Mother     Anxiety Disorder Mother     Hypertension Father     Hearing Loss Father     Valvular heart disease Father         s/p mitral valve replacement    Cerebrovascular Disease Father         TIAs    No Known Problems Sister     No Known Problems Brother     Ovarian Cancer Maternal Grandmother 59         at 61    Pancreatic Cancer Maternal Grandfather 87         at 87    Substance Abuse  Maternal Grandfather         alcohol    Skin Cancer Paternal Grandmother 88    Bladder Cancer Paternal Grandfather 60         at 80    Cancer Paternal Grandfather         Bladder    Breast Cancer Maternal Aunt 51        BRCA2+       Allergy    Allergies   Allergen Reactions    Dust Mites Other (See Comments)       Current Outpatient Medications   Medication Sig Dispense Refill    buPROPion (WELLBUTRIN SR) 200 MG 12 hr tablet Take 1 tablet (200 mg) by mouth 2 times daily 180 tablet 3    cefpodoxime (VANTIN) 200 MG tablet Take 1 tablet (200 mg) by mouth 2 times daily 14 tablet 0    Cholecalciferol (VITAMIN D) 2000 UNITS tablet Take 2,000 Units by mouth every morning       estradiol (ESTRACE) 0.1 MG/GM vaginal cream Place 1 gram vaginally 3 times per week 42.5 g 4    fluticasone (FLONASE) 50 MCG/ACT nasal spray Spray 1 spray into both nostrils as needed       ibuprofen (ADVIL/MOTRIN) 600 MG tablet Take 1 tablet (600 mg) by mouth every 6 hours as needed for moderate pain      Multiple Vitamin (M.V.I. ADULT IV) Take 1 tablet by mouth every morning       propranolol (INDERAL) 10 MG tablet TAKE 1 TABLET BY MOUTH TWICE DAILY AS NEEDED FOR PRESENTATIONS 60 tablet 1    tolterodine (DETROL) 1 MG tablet Take 1 tablet by mouth twice daily. 180 tablet 0    tretinoin (RETIN-A) 0.05 % external cream Apply topically nightly as needed (for acne) 45 g 3     No current facility-administered medications for this visit.       Objective:   Deferred ( virtual visit)     Asssessment and plan  Encounter Diagnosis   Name Primary?    Recurrent UTI      Possible risks: menopause ( s/p BSO for BRCA2). Use Increase vaginal estriol use to 3 times a week  Will bring her in for cystoscopy given hx of sling, and for pelvic exam  Start Laura ( already has this)               I spent a total of 30 minutes on  Video with  Debbie Baker on the date of the encounter in chart review, patient visit, review of tests, documentation and/or discussion  with other providers about the issues documented above.       Irina Sharif MD

## 2024-05-14 ENCOUNTER — PRE VISIT (OUTPATIENT)
Dept: UROLOGY | Facility: CLINIC | Age: 49
End: 2024-05-14
Payer: COMMERCIAL

## 2024-05-14 DIAGNOSIS — N39.0 RECURRENT UTI: Primary | ICD-10-CM

## 2024-05-14 RX ORDER — LIDOCAINE HYDROCHLORIDE 20 MG/ML
JELLY TOPICAL ONCE
Status: ACTIVE | OUTPATIENT
Start: 2024-06-14

## 2024-05-14 NOTE — TELEPHONE ENCOUNTER
Reason for visit: cystoscopy + pelvic exam     Relevant information: recurrent UTI, urgency    Records/imaging/labs/orders: in epic    Pt called: No need for a call    At Rooming: urine + dip prior to cysto, notify provider if nitrites or leukocytes, have speculum and light available for after cysto    Zaire Trinidad  5/14/2024  8:23 AM

## 2024-06-04 ENCOUNTER — OFFICE VISIT (OUTPATIENT)
Dept: OBGYN | Facility: CLINIC | Age: 49
End: 2024-06-04
Attending: STUDENT IN AN ORGANIZED HEALTH CARE EDUCATION/TRAINING PROGRAM
Payer: COMMERCIAL

## 2024-06-04 ENCOUNTER — MYC MEDICAL ADVICE (OUTPATIENT)
Dept: OBGYN | Facility: CLINIC | Age: 49
End: 2024-06-04

## 2024-06-04 VITALS
BODY MASS INDEX: 27.34 KG/M2 | SYSTOLIC BLOOD PRESSURE: 117 MMHG | OXYGEN SATURATION: 98 % | WEIGHT: 149.5 LBS | DIASTOLIC BLOOD PRESSURE: 79 MMHG | HEART RATE: 74 BPM

## 2024-06-04 DIAGNOSIS — Z90.722 S/P BILATERAL OOPHORECTOMY: ICD-10-CM

## 2024-06-04 DIAGNOSIS — N95.8 GENITOURINARY SYNDROME OF MENOPAUSE: ICD-10-CM

## 2024-06-04 DIAGNOSIS — Z15.01 BRCA2 GENE MUTATION POSITIVE IN FEMALE: ICD-10-CM

## 2024-06-04 DIAGNOSIS — R41.89 BRAIN FOG: ICD-10-CM

## 2024-06-04 DIAGNOSIS — N39.0 RECURRENT UTI: ICD-10-CM

## 2024-06-04 DIAGNOSIS — N95.2 VAGINAL ATROPHY: ICD-10-CM

## 2024-06-04 DIAGNOSIS — Z15.09 BRCA2 GENE MUTATION POSITIVE IN FEMALE: ICD-10-CM

## 2024-06-04 DIAGNOSIS — Z15.02 BRCA2 GENE MUTATION POSITIVE IN FEMALE: ICD-10-CM

## 2024-06-04 DIAGNOSIS — N95.1 SYMPTOMATIC MENOPAUSAL OR FEMALE CLIMACTERIC STATES: ICD-10-CM

## 2024-06-04 DIAGNOSIS — G47.00 INSOMNIA, UNSPECIFIED TYPE: ICD-10-CM

## 2024-06-04 DIAGNOSIS — N95.1 SYMPTOMATIC MENOPAUSAL OR FEMALE CLIMACTERIC STATES: Primary | ICD-10-CM

## 2024-06-04 PROCEDURE — 99214 OFFICE O/P EST MOD 30 MIN: CPT | Performed by: OBSTETRICS & GYNECOLOGY

## 2024-06-04 NOTE — PROGRESS NOTES
"  Assessment & Plan     Symptomatic menopausal or female climacteric states  Discussed menopause symptoms  Discussed options for management - hormonal and nonhormonal  Discussed FDA approved indications for MHT  Discussed formulations, risks, benefits.   Reviewed breast cancer and CVD risk  Discussed MHT after risk reducing BSO in BRCA2 carriers. Discussed limited evidence available but systemic MHT in small trials with short follow-up has not shown increased breast cancer risk. She requests systemic MHT as her quality of life is so significantly affected. ;  Discussed reduction in breast cancer risk elevation with non-systemic progestin. If effective will consider Mirena IUD.   Discussed risk of thrombo-embolism, vaginal bleeding, other side effects.   RTC 3 months.     - Ob/Gyn  Referral  - Ob/Gyn  Referral    Genitourinary syndrome of menopause  Symptoms currently very bothersome. Recommend starting over with 14 day course   - estradiol (ESTRACE) 0.1 MG/GM vaginal cream; Place 0.5 grams vaginally every day for 14 days then 2-3 times weekly    BRCA2 gene mutation positive in female    - Ob/Gyn  Referral  - Ob/Gyn  Referral    Insomnia, unspecified type    - Ob/Gyn  Referral  - Ob/Gyn  Referral    Recurrent UTI    - Ob/Gyn  Referral    Brain fog    - Ob/Gyn  Referral          BMI  Estimated body mass index is 27.34 kg/m  as calculated from the following:    Height as of 4/26/24: 1.575 m (5' 2\").    Weight as of this encounter: 67.8 kg (149 lb 8 oz).             No follow-ups on file.    Lidia Lewis is a 48 year old, presenting for the following health issues:  Consult (Hormone replacement)    HPI   Presents for menopause consult.   Ovaries, tubes removed 4 years ago  BRCA 2 positive  Worse in the last 6 months, symptom wise  Very easy tearfulness  Frequent UTI  Uses topical vaginal estrogen.  Insomnia  Dry/painful sex  Brain fog - really " bad.     Symptoms have been significantly affecting quality of life.     Maternal aunt breast cancer  Maternal grandmother had ovarian cancer but no brca testing.   Mom has not had breast cancer.     10 yr ASCVD risk: 0.5%    Past Medical History:   Diagnosis Date    Allergic rhinitis     At high risk for breast cancer 2016    BRCA2+    At risk for cancer 2016    at risk for ovarian/fallopian tube cancer, BRCA2+    BRCA2 positive 2016    [id number]; single site analysis through [employer]    Depression     Depressive disorder     seasonal depression    Mastitis in female        Past Surgical History:   Procedure Laterality Date    CARPAL TUNNEL RELEASE RT/LT Bilateral         CYSTOSCOPY, SLING TRANSVAGINAL N/A 9/15/2020    Procedure: Midurethral sling and cystoscopy;  Surgeon: Pamela Chilel MD;  Location: [facility] OR    GENITOURINARY SURGERY  9/15/2020    bladder mesh    LAPAROSCOPIC SALPINGO-OOPHORECTOMY Bilateral 9/15/2020    Procedure: laparoscopic bilateral salpingo-oophorectomy, pelvic washings;  Surgeon: Celeste Murillo MD;  Location: [facility] OR    SOFT TISSUE SURGERY  2018    bilateral carpal tunnel release    wisdom teeth         Family History   Problem Relation Age of Onset    Hypertension Mother     Depression Mother     Anxiety Disorder Mother     Hypertension Father     Hearing Loss Father     Valvular heart disease Father         s/p mitral valve replacement    Cerebrovascular Disease Father         TIAs    No Known Problems Sister     No Known Problems Brother     Ovarian Cancer Maternal Grandmother 59         at 61    Pancreatic Cancer Maternal Grandfather 87         at 87    Substance Abuse Maternal Grandfather         alcohol    Skin Cancer Paternal Grandmother 88    Bladder Cancer Paternal Grandfather 60         at 80    Cancer Paternal Grandfather         Bladder    Breast Cancer Maternal Aunt 51        BRCA2+       Social History      Socioeconomic History    Marital status:      Spouse name: Tavares    Number of children: 2    Years of education: Not on file    Highest education level: Not on file   Occupational History    Occupation: unemployed   Tobacco Use    Smoking status: Never    Smokeless tobacco: Never   Vaping Use    Vaping status: Never Used   Substance and Sexual Activity    Alcohol use: Yes     Comment: 2 drink/week    Drug use: No    Sexual activity: Yes     Partners: Male     Birth control/protection: Post-menopausal     Comment: salpingo oophorectomy   Other Topics Concern     Service Not Asked    Blood Transfusions Not Asked    Caffeine Concern No    Occupational Exposure No    Hobby Hazards No    Sleep Concern No    Stress Concern Yes    Weight Concern No    Special Diet No    Back Care No    Exercise Yes     Comment: works out with , does yoga 3-4x/week    Bike Helmet Not Asked    Seat Belt Yes    Self-Exams Yes    Parent/sibling w/ CABG, MI or angioplasty before 65F 55M? No   Social History Narrative    Not on file     Social Determinants of Health     Financial Resource Strain: Not on file   Food Insecurity: Not on file   Transportation Needs: Not on file   Physical Activity: Not on file   Stress: Not on file   Social Connections: Not on file   Interpersonal Safety: Not At Risk (6/16/2023)    Humiliation, Afraid, Rape, and Kick questionnaire     Fear of Current or Ex-Partner: No     Emotionally Abused: No     Physically Abused: No     Sexually Abused: No   Housing Stability: Not on file       Current Outpatient Medications   Medication Sig Dispense Refill    buPROPion (WELLBUTRIN SR) 200 MG 12 hr tablet Take 1 tablet (200 mg) by mouth 2 times daily 180 tablet 3    cefpodoxime (VANTIN) 200 MG tablet Take 1 tablet (200 mg) by mouth 2 times daily 14 tablet 0    Cholecalciferol (VITAMIN D) 2000 UNITS tablet Take 2,000 Units by mouth every morning       estradiol (ESTRACE) 0.1 MG/GM vaginal cream  Place 1 gram vaginally 3 times per week 42.5 g 4    fluticasone (FLONASE) 50 MCG/ACT nasal spray Spray 1 spray into both nostrils as needed       ibuprofen (ADVIL/MOTRIN) 600 MG tablet Take 1 tablet (600 mg) by mouth every 6 hours as needed for moderate pain      Multiple Vitamin (M.V.I. ADULT IV) Take 1 tablet by mouth every morning       propranolol (INDERAL) 10 MG tablet TAKE 1 TABLET BY MOUTH TWICE DAILY AS NEEDED FOR PRESENTATIONS 60 tablet 1    tolterodine (DETROL) 1 MG tablet Take 1 tablet by mouth twice daily. 180 tablet 0    tretinoin (RETIN-A) 0.05 % external cream Apply topically nightly as needed (for acne) 45 g 3     Current Facility-Administered Medications   Medication Dose Route Frequency Provider Last Rate Last Admin    [START ON 6/14/2024] lidocaine (XYLOCAINE) 2 % external gel   Urethral Once Irina Sharif MD              Allergies   Allergen Reactions    Dust Mites Other (See Comments)           Review of Systems  Constitutional, HEENT, cardiovascular, pulmonary, gi and gu systems are negative, except as otherwise noted.      Objective    /79 (BP Location: Left arm, Patient Position: Sitting, Cuff Size: Adult Regular)   Pulse 74   Wt 67.8 kg (149 lb 8 oz)   LMP 08/24/2020 (Approximate)   SpO2 98%   BMI 27.34 kg/m    Body mass index is 27.34 kg/m .  Physical Exam   GENERAL: alert and no distress  MS: no gross musculoskeletal defects noted, no edema  PSYCH: mentation appears normal, affect normal/bright, tearful, anxious, and judgement and insight intact    Component      Latest Ref Rng 6/8/2023  8:03 AM   Cholesterol      <200 mg/dL 177    Triglycerides      <150 mg/dL 50    HDL Cholesterol      >=50 mg/dL 75    LDL Cholesterol Calculated      <=100 mg/dL 92    Non HDL Cholesterol      <130 mg/dL 102          BILATERAL FULL FIELD DIGITAL SCREENING MAMMOGRAM WITH TOMOSYNTHESIS     Performed on: 6/16/23     Compared to: 08/27/2021, 04/12/2019, and 03/30/2018     Technique:  This study  was evaluated with the assistance of Computer-Aided Detection.  Breast Tomosynthesis was used in interpretation.     Findings: The breasts are heterogeneously dense, which may obscure small masses.  There is no radiographic evidence of malignancy.                                                                    IMPRESSION: ACR BI-RADS Category 1: Negative     RECOMMENDED FOLLOW-UP: Annual routine screening mammogram     The results and recommendations of this examination will be communicated to the patient.     I have personally reviewed the examination and initial interpretation  and I agree with the findings.        Dion Washburn MD; April Nicolas MD    EXAMINATION: MR BREAST BILATERAL W/O & W CONTRAST, 11/9/2022  3:19 PM      HISTORY/FAMILY HISTORY: No current or new breast symptoms.  BRCA2 gene mutation positive in female; Dense breast; Family history  of malignant neoplasm of breast; Breast cancer screening, high risk  patient     COMPARISON: Breast MRI 11/4/2020, 10/24/2018, 9/15/2017. Mammogram  8/27/2021     TECHNIQUE: Axial T2 images with fat suppression and axial T1 without  fat saturation images were obtained of both breasts prior to  gadolinium administration. Following the uneventful administration of  weight-based gadolinium intravenously, high-resolution dynamic imaging  of both breasts was performed in the axial plane. Dynamic images are  reviewed with subtraction technique. Axial and coronal maximal  intensity projection images are displayed.  Kinetic analysis was  performed using a separate station. Contrast: 6.5 ML Gadavist     FINDINGS:  Breast composition: Heterogeneous fibroglandular tissue  Background parenchymal enhancement: Minimal     There is no suspicious enhancement or lymphadenopathy.  No significant  change.                                                                      IMPRESSION: BI-RADS CATEGORY: 1 -  Negative.     RECOMMENDED FOLLOW-UP: Annual Mammography.      Annual age-appropriate and risk-appropriate breast cancer screening.     LINN HARRIS MD        Signed Electronically by: Any Mckinney MD

## 2024-06-04 NOTE — Clinical Note
Hi Stacie!  Thanks for sending Debbie for MHT consult. She is starting a combination patch. Might put a Mirena in to avoid systemic progestin.  Any

## 2024-06-06 RX ORDER — ESTRADIOL 0.1 MG/G
CREAM VAGINAL
Qty: 42.5 G | Refills: 4 | Status: CANCELLED | OUTPATIENT
Start: 2024-06-06

## 2024-06-07 RX ORDER — ESTRADIOL 0.1 MG/G
CREAM VAGINAL
Qty: 42.5 G | Refills: 3 | Status: SHIPPED | OUTPATIENT
Start: 2024-06-07

## 2024-06-10 DIAGNOSIS — F32.A DEPRESSIVE DISORDER: ICD-10-CM

## 2024-06-10 DIAGNOSIS — F33.8 SEASONAL AFFECTIVE DISORDER (H): ICD-10-CM

## 2024-06-10 RX ORDER — BUPROPION HYDROCHLORIDE 200 MG/1
200 TABLET, EXTENDED RELEASE ORAL 2 TIMES DAILY
Qty: 180 TABLET | Refills: 3 | Status: SHIPPED | OUTPATIENT
Start: 2024-06-10

## 2024-06-14 ENCOUNTER — MYC MEDICAL ADVICE (OUTPATIENT)
Dept: UROLOGY | Facility: CLINIC | Age: 49
End: 2024-06-14

## 2024-06-14 ENCOUNTER — OFFICE VISIT (OUTPATIENT)
Dept: UROLOGY | Facility: CLINIC | Age: 49
End: 2024-06-14
Payer: COMMERCIAL

## 2024-06-14 VITALS
OXYGEN SATURATION: 99 % | HEIGHT: 62 IN | HEART RATE: 68 BPM | DIASTOLIC BLOOD PRESSURE: 87 MMHG | SYSTOLIC BLOOD PRESSURE: 127 MMHG | WEIGHT: 147 LBS | BODY MASS INDEX: 27.05 KG/M2

## 2024-06-14 DIAGNOSIS — N39.0 RECURRENT UTI: Primary | ICD-10-CM

## 2024-06-14 PROCEDURE — 52000 CYSTOURETHROSCOPY: CPT | Performed by: OBSTETRICS & GYNECOLOGY

## 2024-06-14 RX ORDER — LIDOCAINE HYDROCHLORIDE 20 MG/ML
JELLY TOPICAL ONCE
Status: ACTIVE | OUTPATIENT
Start: 2024-06-14

## 2024-06-14 ASSESSMENT — PAIN SCALES - GENERAL: PAINLEVEL: NO PAIN (0)

## 2024-06-14 NOTE — NURSING NOTE
"Chief Complaint   Patient presents with    Cystoscopy       Blood pressure 127/87, pulse 68, height 1.575 m (5' 2\"), weight 66.7 kg (147 lb), last menstrual period 08/24/2020, SpO2 99%, not currently breastfeeding. Body mass index is 26.89 kg/m .    Patient Active Problem List   Diagnosis    Family history of malignant neoplasm of breast    Allergic rhinitis    Seasonal affective disorder (H24)    Family history of ovarian cancer    Family history of pancreatic cancer    At high risk for breast cancer    At risk for cancer    Current mild episode of major depressive disorder without prior episode (H24)    CTS (carpal tunnel syndrome)    BRCA2 gene mutation positive in female    Acne vulgaris    S/P bilateral oophorectomy    Urge incontinence of urine    Depressive disorder    Vaginal atrophy    Situational anxiety    Insomnia, unspecified type    Recurrent UTI       Allergies   Allergen Reactions    Dust Mites Other (See Comments)       Current Outpatient Medications   Medication Sig Dispense Refill    buPROPion (WELLBUTRIN SR) 200 MG 12 hr tablet Take 1 tablet (200 mg) by mouth 2 times daily 180 tablet 3    cefpodoxime (VANTIN) 200 MG tablet Take 1 tablet (200 mg) by mouth 2 times daily 14 tablet 0    Cholecalciferol (VITAMIN D) 2000 UNITS tablet Take 2,000 Units by mouth every morning       estradiol (ESTRACE) 0.1 MG/GM vaginal cream Place 0.5 grams vaginally every day for 14 days then 2-3 times weekly 42.5 g 3    estradiol (ESTRACE) 0.1 MG/GM vaginal cream Place 1 gram vaginally 3 times per week 42.5 g 4    estradiol-norethindrone (COMBIPATCH) 0.05-0.14 MG/DAY bi-weekly patch Place 1 patch onto the skin twice a week 24 patch 3    fluticasone (FLONASE) 50 MCG/ACT nasal spray Spray 1 spray into both nostrils as needed       ibuprofen (ADVIL/MOTRIN) 600 MG tablet Take 1 tablet (600 mg) by mouth every 6 hours as needed for moderate pain      Multiple Vitamin (M.V.I. ADULT IV) Take 1 tablet by mouth every morning    "    propranolol (INDERAL) 10 MG tablet TAKE 1 TABLET BY MOUTH TWICE DAILY AS NEEDED FOR PRESENTATIONS 60 tablet 1    tolterodine (DETROL) 1 MG tablet Take 1 tablet by mouth twice daily. 180 tablet 0    tretinoin (RETIN-A) 0.05 % external cream Apply topically nightly as needed (for acne) 45 g 3       Social History     Tobacco Use    Smoking status: Never    Smokeless tobacco: Never   Vaping Use    Vaping status: Never Used   Substance Use Topics    Alcohol use: Yes     Comment: 2 drink/week    Drug use: No       What to expect after the procedure reviewed with patient: ***    Axel Jose  2024  1:19 PM     Invasive Procedure Safety Checklist:    Procedure: ***    Action: Complete sections and checkboxes as appropriate.    Pre-procedure:  1. Patient ID Verified with 2 identifiers (Sarika and  or MRN) : YES    2. Procedure and site verified with patient/designee (when able) : YES    3. Accurate consent documentation in medical record : YES    4. H&P (or appropriate assessment) documented in medical record : YES  H&P must be up to 30 days prior to procedure an updated within 24 hours of                 Procedure as applicable.     5. Relevant diagnostic and radiology test results appropriately labeled and displayed as applicable : YES    6. Blood products, implants, devices, and/or special equipment available for the procedure as applicable : YES    7. Procedure site(s) marked with provider initials [Exclusions: None] : NO    8. Marking not required. Reason : Yes  Procedure does not require site marking    Time Out:     Time-Out performed immediately prior to starting procedure, including verbal and active participation of all team members addressing: YES    1. Correct patient identity.  2. Confirmed that the correct side and site are marked.  3. An accurate procedure to be done.  4. Agreement on the procedure to be done.  5. Correct patient position.  6. Relevant images and results are properly labeled and  appropriately displayed.  7. The need to administer antibiotics or fluids for irrigation purposes during the procedure as applicable.  8. Safety precautions based on patient history or medication use.    During Procedure: Verification of correct person, site, and procedure occurs any time the responsibility for care of the patient is transferred to another member of the care team.      The following medication was given:     MEDICATION:  Uro-jet  ROUTE: Intra-urethral   SITE: Urethra  DOSE: 10 mL 2% lidocaine  LOT #: ***  : [employer]  EXPIRATION DATE: ***  NDC#: [address]-64   Was there drug waste? No    Prior to administration, verified patient identity using patient's name and date of birth.  Due to administration, patient instructed to remain in clinic for 15 minutes  afterwards, and to report any adverse reaction to me immediately.      Drug Amount Wasted:  None.  Vial/Syringe: Single dose vial    Axel Garcia  June 14, 2024

## 2024-06-14 NOTE — PROGRESS NOTES
"2024    Referring Provider: Irina Sharif MD  [address] [place] [address]    Primary Care Provider: No Ref-Primary, Physician      HPI:  Debbie Baker is here for cystoscopy and pelvic exam for her hx of recurrent UTIs and dyspareunia. I evaluated her on a new virtual visit on 24 as described below.  We had discussed continuing her vaginal estrogen cream and starting D-Mannos.  Her hx is sig for transobturator midurethral incontinence sling in  ([employer], by Dr Chilel), and bilateral oophorectomy at the same time by Dr Murillo ( Cancer prevention).     This note was copied and pasted from virtual visit with Dr Sharif on 24    Chief complaint: recurrent UTI    Subjective     Debbie Baker is a 48 year old  who presents for a video visit today for recurrent UTI. She says she usually gets severe urgency, and some burning with voids. \"Feels like my urethra is irritated /on edge\" and urine is cloudy. No fevers . No CVA tenderness. See UTI hx below. Most recent infection on 24 was treated with 1 week of cefpodoxime ( vantin). She says these recurrent UTIs started in  and says the only new thing is more menopausal symptoms. She is using estradiol a couple times a week for at least a year but only started using it more consistently since her UTI started. She is not really sexually active due to pain with intercourse likely due to dryness. Surgical hx sig for incontinence sling in .      Urine test history  24 Urine culture grew >100k cfu of ecoli that is resistant to bactrim/gent/augmentin.   24 with 10-50k ecoli and 50.100k cfu group B strep.   3.18.24: <10 k group B strep  24: >100k cfu ecoli, resistant to augmentin  Two other urine cultures in  were all negative    EXAMINATION: CT ABDOMEN PELVIS W/O CONTRAST, 2023   FINDINGS:     Abdomen and pelvis: Subcentimeter fluid attenuation cyst in hepatic  segment 2 corresponding to " that seen on comparison ultrasound. No  appreciable focal suspicious hepatic lesion on these noncontrast  images. Normal gallbladder, biliary tree, pancreas, spleen, adrenal  glands, and renal cortices. No hydronephrosis, hydroureter, or urinary  tract stone. The uterus is normal. Surgical changes of bilateral  salpingo-oophorectomy. No free fluid or free air. No bowel obstruction  or inflammation. Mild to moderate colonic stool burden. Normal  appendix. No abdominal or pelvic lymphadenopathy.     Lung bases:  Clear.     Bones and soft tissues: No acute or aggressive osseus abnormality.                                                                      IMPRESSION: No acute intra-abdominal pathology.         Urinary symptoms  Says she has urgency incontinence which usually gets worse with her UTIs. No stress leaks ( since her TVT surgery in 2020) . No voiding difficulty but says it takes much longer to empty since her sling. No gross hematuria. Has been taking tolteredine when she has urinary urgency    Prolapse symptoms  Denies vaginal bulge, pressure sensation or protrusion.    GI symptoms  Denies chronic diarrhea, constipation (taking miralax). Denies bothersome fecal or flatal incontinence. Denies defecatory difficulties.       Sexual health/Pelvic floor  Not sexually active currently due to pain /dryness. S/p salpingoophorectomy  for BRCA2 positive status    Relevant Medical History:    Diabetes? no  High Blood pressure? no     Recurrent UTIs? yes  Sleep Apnea? no  Obesity? Body mass index is 25.61 kg/m .  History of Blood clots? no  Other medical problems: insomnia       Surgical History:      Past Surgical History:   Procedure Laterality Date    CARPAL TUNNEL RELEASE RT/LT Bilateral     2018    CYSTOSCOPY, SLING TRANSVAGINAL N/A 9/15/2020    Procedure: Midurethral sling and cystoscopy;  Surgeon: Pamela Chilel MD;  Location: [facility] OR    GENITOURINARY SURGERY  9/15/2020    bladder mesh    LAPAROSCOPIC  SALPINGO-OOPHORECTOMY Bilateral 9/15/2020    Procedure: laparoscopic bilateral salpingo-oophorectomy, pelvic washings;  Surgeon: Celeste Murillo MD;  Location: [facility] OR    SOFT TISSUE SURGERY  2018    bilateral carpal tunnel release    wisdom teeth         OB/Gyn History:  OB History    Para Term  AB Living   2 2 0 0 0 0   SAB IAB Ectopic Multiple Live Births   0 0 0 0 0      # Outcome Date GA Lbr Trever/2nd Weight Sex Type Anes PTL Lv   2 Para            1 Para               Obstetric Comments   2        Medications/Vitamins/Supplements:   Current Outpatient Medications   Medication Sig Dispense Refill    buPROPion (WELLBUTRIN SR) 200 MG 12 hr tablet Take 1 tablet (200 mg) by mouth 2 times daily 180 tablet 3    cefpodoxime (VANTIN) 200 MG tablet Take 1 tablet (200 mg) by mouth 2 times daily 14 tablet 0    Cholecalciferol (VITAMIN D) 2000 UNITS tablet Take 2,000 Units by mouth every morning       estradiol (ESTRACE) 0.1 MG/GM vaginal cream Place 0.5 grams vaginally every day for 14 days then 2-3 times weekly 42.5 g 3    estradiol (ESTRACE) 0.1 MG/GM vaginal cream Place 1 gram vaginally 3 times per week 42.5 g 4    estradiol-norethindrone (COMBIPATCH) 0.05-0.14 MG/DAY bi-weekly patch Place 1 patch onto the skin twice a week 24 patch 3    fluticasone (FLONASE) 50 MCG/ACT nasal spray Spray 1 spray into both nostrils as needed       ibuprofen (ADVIL/MOTRIN) 600 MG tablet Take 1 tablet (600 mg) by mouth every 6 hours as needed for moderate pain      Multiple Vitamin (M.V.I. ADULT IV) Take 1 tablet by mouth every morning       propranolol (INDERAL) 10 MG tablet TAKE 1 TABLET BY MOUTH TWICE DAILY AS NEEDED FOR PRESENTATIONS 60 tablet 1    tolterodine (DETROL) 1 MG tablet Take 1 tablet by mouth twice daily. 180 tablet 0    tretinoin (RETIN-A) 0.05 % external cream Apply topically nightly as needed (for acne) 45 g 3     Current Facility-Administered Medications   Medication Dose Route Frequency Provider  Last Rate Last Admin    lidocaine (XYLOCAINE) 2 % external gel   Urethral Once Irina Sharif MD             Medical History:      Past Medical History:   Diagnosis Date    Allergic rhinitis     At high risk for breast cancer 01/12/2016    BRCA2+    At risk for cancer 01/12/2016    at risk for ovarian/fallopian tube cancer, BRCA2+    BRCA2 positive 01/12/2016    [id number]; single site analysis through [employer]    Depression     Depressive disorder 2011    seasonal depression    Mastitis in female 2010     ROS  Social History    Social History     Socioeconomic History    Marital status:      Spouse name: Tavares    Number of children: 2    Years of education: Not on file    Highest education level: Not on file   Occupational History    Occupation: unemployed   Tobacco Use    Smoking status: Never    Smokeless tobacco: Never   Vaping Use    Vaping status: Never Used   Substance and Sexual Activity    Alcohol use: Yes     Comment: 2 drink/week    Drug use: No    Sexual activity: Yes     Partners: Male     Birth control/protection: Post-menopausal     Comment: salpingo oophorectomy   Other Topics Concern     Service Not Asked    Blood Transfusions Not Asked    Caffeine Concern No    Occupational Exposure No    Hobby Hazards No    Sleep Concern No    Stress Concern Yes    Weight Concern No    Special Diet No    Back Care No    Exercise Yes     Comment: works out with , does yoga 3-4x/week    Bike Helmet Not Asked    Seat Belt Yes    Self-Exams Yes    Parent/sibling w/ CABG, MI or angioplasty before 65F 55M? No   Social History Narrative    Not on file     Social Determinants of Health     Financial Resource Strain: Not on file   Food Insecurity: Not on file   Transportation Needs: Not on file   Physical Activity: Not on file   Stress: Not on file   Social Connections: Not on file   Interpersonal Safety: Not At Risk (6/16/2023)    Humiliation, Afraid, Rape, and Kick questionnaire      Fear of Current or Ex-Partner: No     Emotionally Abused: No     Physically Abused: No     Sexually Abused: No   Housing Stability: Not on file       Family History  Family History   Problem Relation Age of Onset    Hypertension Mother     Depression Mother     Anxiety Disorder Mother     Hypertension Father     Hearing Loss Father     Valvular heart disease Father         s/p mitral valve replacement    Cerebrovascular Disease Father         TIAs    No Known Problems Sister     No Known Problems Brother     Ovarian Cancer Maternal Grandmother 59         at 61    Pancreatic Cancer Maternal Grandfather 87         at 87    Substance Abuse Maternal Grandfather         alcohol    Skin Cancer Paternal Grandmother 88    Bladder Cancer Paternal Grandfather 60         at 80    Cancer Paternal Grandfather         Bladder    Breast Cancer Maternal Aunt 51        BRCA2+       Allergy    Allergies   Allergen Reactions    Dust Mites Other (See Comments)       Current Outpatient Medications   Medication Sig Dispense Refill    buPROPion (WELLBUTRIN SR) 200 MG 12 hr tablet Take 1 tablet (200 mg) by mouth 2 times daily 180 tablet 3    cefpodoxime (VANTIN) 200 MG tablet Take 1 tablet (200 mg) by mouth 2 times daily 14 tablet 0    Cholecalciferol (VITAMIN D) 2000 UNITS tablet Take 2,000 Units by mouth every morning       estradiol (ESTRACE) 0.1 MG/GM vaginal cream Place 0.5 grams vaginally every day for 14 days then 2-3 times weekly 42.5 g 3    estradiol (ESTRACE) 0.1 MG/GM vaginal cream Place 1 gram vaginally 3 times per week 42.5 g 4    estradiol-norethindrone (COMBIPATCH) 0.05-0.14 MG/DAY bi-weekly patch Place 1 patch onto the skin twice a week 24 patch 3    fluticasone (FLONASE) 50 MCG/ACT nasal spray Spray 1 spray into both nostrils as needed       ibuprofen (ADVIL/MOTRIN) 600 MG tablet Take 1 tablet (600 mg) by mouth every 6 hours as needed for moderate pain      Multiple Vitamin (M.V.I. ADULT IV) Take 1 tablet  "by mouth every morning       propranolol (INDERAL) 10 MG tablet TAKE 1 TABLET BY MOUTH TWICE DAILY AS NEEDED FOR PRESENTATIONS 60 tablet 1    tolterodine (DETROL) 1 MG tablet Take 1 tablet by mouth twice daily. 180 tablet 0    tretinoin (RETIN-A) 0.05 % external cream Apply topically nightly as needed (for acne) 45 g 3     Current Facility-Administered Medications   Medication Dose Route Frequency Provider Last Rate Last Admin    lidocaine (XYLOCAINE) 2 % external gel   Urethral Once Irina Sharif MD           Physical Exam: /87   Pulse 68   Ht 1.575 m (5' 2\")   Wt 66.7 kg (147 lb)   LMP 08/24/2020 (Approximate)   SpO2 99%   BMI 26.89 kg/m      LMP 08/24/2020 (Approximate)  Patient's last menstrual period was 08/24/2020 (approximate). There is no height or weight on file to calculate BMI.    Gen:  is alert, comfortable in no acute distress,   Abdomen: Abdomen is soft, non-tender, non-distended,   Lungs: non-labored breathing    Pelvic Exam:   Normal external female genitalia. The urethra was Normal.    Vagina: normal support, mild atrophy, no abnormal discharge or bleeding  Uterus: Normal size, non tender   Ovaries: No palpable mass   Vulva: No lesions, no pain   Rectal: Deferred     Pelvic floor strength: 3/5 kegels.    Pelvic floor muscles: No myalgia, guarding some    Voiding trial:    Deferred    Office cystoscopy    Patient was verbally consented for cystoscopy after risks (urethral discomfort, irritative bladder, uti etc) and indications/benefits were discussed    External urethral meatus was cleaned with betadine and lidojet place at urethral meatus for comfort    Cystourethroscopy with a flexible scope performed showed normal bladder and urethral mucosa without evidence of trauma, lesions, or foreign bodies. Bilaterally normally localized ureters noted. Some debri/bifilm    Patient tolerated the procedure well.     Labs:   Color Urine (no units)   Date Value   04/18/2024 Yellow   01/05/2021 " Yellow     Appearance Urine (no units)   Date Value   04/18/2024 Clear   01/05/2021 Clear     Glucose Urine (mg/dL)   Date Value   04/18/2024 Negative   01/05/2021 Negative     Bilirubin Urine (no units)   Date Value   04/18/2024 Negative   01/05/2021 Negative     Ketones Urine (mg/dL)   Date Value   04/18/2024 Negative   01/05/2021 Negative     Specific Gravity Urine (no units)   Date Value   04/18/2024 1.020   01/05/2021 1.020     pH Urine   Date Value   04/18/2024 7.0   01/05/2021 7.0 pH     Protein Albumin Urine (mg/dL)   Date Value   04/18/2024 Negative   01/05/2021 Negative     Urobilinogen Urine   Date Value   04/18/2024 0.2 E.U./dL   01/05/2021 0.2 EU/dL     Nitrite Urine (no units)   Date Value   04/18/2024 Positive (A)   01/05/2021 Negative     Leukocyte Esterase Urine (no units)   Date Value   04/18/2024 Trace (A)   01/05/2021 Large (A)     CBC RESULTS:   Recent Labs   Lab Test 05/09/23  1456   WBC 7.2   RBC 4.91   HGB 15.2   HCT 43.4   MCV 88   MCH 31.0   MCHC 35.0   RDW 12.0            A/P: Debbie Baker is a 48 year old F with     Debbie was seen today for cystoscopy.    Diagnoses and all orders for this visit:    Recurrent UTI  -     lidocaine (XYLOCAINE) 2 % external gel  -     CYSTOURETHROSCOPY          Normal cystoscopy today which is great  No sign of mesh erosion in bladder/urethra or vagina  No levator myalgia   Discussed hydration, continue using estrace cream and D mannos  If persistent uti, will consider antibiotic suppression (also check PVR -not done today)       I spent a total of 30 minutes with  Debbie Baker the majority of time spent on her procedure today on the date of the encounter in chart review, face to face patient visit, review of tests, documentation and/or discussion with other providers about the issues documented above.     Irina Sharif MD, [facility]  , Department of OBGYN  Female Pelvic Medicine and Reconstructive Surgery ( Urogynecology)  CC  Patient Care  Team:  No Ref-Primary, Physician as PCP - General  Marianela Davis MD (OB/Gyn)  Bel Jo MD as MD (Urology)  Celeste Murillo MD as Referring Physician (Oncology)  Pamela Chilel MD as MD (Urology)  Linh Arriaza, RN as Specialty Care Coordinator (Urology)  Aspen Renteria as Referring Physician (Nurse Practitioner)  Stacie Be DO as Assigned PCP  Alisia Shankar APRN CNP as Clinical Nurse Specialist (Hematology & Oncology)  Knedy Infante MD as MD (OB/Gyn)  Any Mckinney MD as MD (OB/Gyn)  Irina Sharif MD as MD (OB/Gyn)  Irina Sharif MD as Assigned OBGYN Provider  IRINA SHARIF

## 2024-06-14 NOTE — LETTER
"2024       RE: Debbie Baker  [address]  [facility] [address]     Dear Colleague,    Thank you for referring your patient, Debbie Baker, to the [facility] [place] at [facility]. Please see a copy of my visit note below.    2024    Referring Provider: Irina Sharif MD  [address] [place] [address]    Primary Care Provider: No Ref-Primary, Physician      HPI:  Debbie Baker is here for cystoscopy and pelvic exam for her hx of recurrent UTIs and dyspareunia. I evaluated her on a new virtual visit on 24 as described below.  We had discussed continuing her vaginal estrogen cream and starting D-Mannos.  Her hx is sig for transobturator midurethral incontinence sling in  (Double Doods Solyx, by Dr Chilel), and bilateral oophorectomy at the same time by Dr Murillo ( Cancer prevention).     This note was copied and pasted from virtual visit with Dr Sharif on 24    Chief complaint: recurrent UTI    Subjective     Debbie Baker is a 48 year old  who presents for a video visit today for recurrent UTI. She says she usually gets severe urgency, and some burning with voids. \"Feels like my urethra is irritated /on edge\" and urine is cloudy. No fevers . No CVA tenderness. See UTI hx below. Most recent infection on 24 was treated with 1 week of cefpodoxime ( vantin). She says these recurrent UTIs started in  and says the only new thing is more menopausal symptoms. She is using estradiol a couple times a week for at least a year but only started using it more consistently since her UTI started. She is not really sexually active due to pain with intercourse likely due to dryness. Surgical hx sig for incontinence sling in .      Urine test history  24 Urine culture grew >100k cfu of ecoli that is resistant to bactrim/gent/augmentin.   24 with 10-50k ecoli and 50.100k cfu group B strep. "   3.18.24: <10 k group B strep  1.22.24: >100k cfu ecoli, resistant to augmentin  Two other urine cultures in 2023 were all negative    EXAMINATION: CT ABDOMEN PELVIS W/O CONTRAST, 5/17/2023   FINDINGS:     Abdomen and pelvis: Subcentimeter fluid attenuation cyst in hepatic  segment 2 corresponding to that seen on comparison ultrasound. No  appreciable focal suspicious hepatic lesion on these noncontrast  images. Normal gallbladder, biliary tree, pancreas, spleen, adrenal  glands, and renal cortices. No hydronephrosis, hydroureter, or urinary  tract stone. The uterus is normal. Surgical changes of bilateral  salpingo-oophorectomy. No free fluid or free air. No bowel obstruction  or inflammation. Mild to moderate colonic stool burden. Normal  appendix. No abdominal or pelvic lymphadenopathy.     Lung bases:  Clear.     Bones and soft tissues: No acute or aggressive osseus abnormality.                                                                      IMPRESSION: No acute intra-abdominal pathology.         Urinary symptoms  Says she has urgency incontinence which usually gets worse with her UTIs. No stress leaks ( since her TVT surgery in 2020) . No voiding difficulty but says it takes much longer to empty since her sling. No gross hematuria. Has been taking tolteredine when she has urinary urgency    Prolapse symptoms  Denies vaginal bulge, pressure sensation or protrusion.    GI symptoms  Denies chronic diarrhea, constipation (taking miralax). Denies bothersome fecal or flatal incontinence. Denies defecatory difficulties.       Sexual health/Pelvic floor  Not sexually active currently due to pain /dryness. S/p salpingoophorectomy  for BRCA2 positive status    Relevant Medical History:    Diabetes? no  High Blood pressure? no     Recurrent UTIs? yes  Sleep Apnea? no  Obesity? Body mass index is 25.61 kg/m .  History of Blood clots? no  Other medical problems: insomnia       Surgical History:      Past Surgical  History:   Procedure Laterality Date    CARPAL TUNNEL RELEASE RT/LT Bilateral     2018    CYSTOSCOPY, SLING TRANSVAGINAL N/A 9/15/2020    Procedure: Midurethral sling and cystoscopy;  Surgeon: Pamela Chilel MD;  Location: [facility] OR    GENITOURINARY SURGERY  9/15/2020    bladder mesh    LAPAROSCOPIC SALPINGO-OOPHORECTOMY Bilateral 9/15/2020    Procedure: laparoscopic bilateral salpingo-oophorectomy, pelvic washings;  Surgeon: Celeste Murillo MD;  Location: [facility] OR    SOFT TISSUE SURGERY  2018    bilateral carpal tunnel release    wisdom teeth         OB/Gyn History:  OB History    Para Term  AB Living   2 2 0 0 0 0   SAB IAB Ectopic Multiple Live Births   0 0 0 0 0      # Outcome Date GA Lbr Trever/2nd Weight Sex Type Anes PTL Lv   2 Para            1 Para               Obstetric Comments   2        Medications/Vitamins/Supplements:   Current Outpatient Medications   Medication Sig Dispense Refill    buPROPion (WELLBUTRIN SR) 200 MG 12 hr tablet Take 1 tablet (200 mg) by mouth 2 times daily 180 tablet 3    cefpodoxime (VANTIN) 200 MG tablet Take 1 tablet (200 mg) by mouth 2 times daily 14 tablet 0    Cholecalciferol (VITAMIN D) 2000 UNITS tablet Take 2,000 Units by mouth every morning       estradiol (ESTRACE) 0.1 MG/GM vaginal cream Place 0.5 grams vaginally every day for 14 days then 2-3 times weekly 42.5 g 3    estradiol (ESTRACE) 0.1 MG/GM vaginal cream Place 1 gram vaginally 3 times per week 42.5 g 4    estradiol-norethindrone (COMBIPATCH) 0.05-0.14 MG/DAY bi-weekly patch Place 1 patch onto the skin twice a week 24 patch 3    fluticasone (FLONASE) 50 MCG/ACT nasal spray Spray 1 spray into both nostrils as needed       ibuprofen (ADVIL/MOTRIN) 600 MG tablet Take 1 tablet (600 mg) by mouth every 6 hours as needed for moderate pain      Multiple Vitamin (M.V.I. ADULT IV) Take 1 tablet by mouth every morning       propranolol (INDERAL) 10 MG tablet TAKE 1 TABLET BY MOUTH TWICE DAILY AS NEEDED  FOR PRESENTATIONS 60 tablet 1    tolterodine (DETROL) 1 MG tablet Take 1 tablet by mouth twice daily. 180 tablet 0    tretinoin (RETIN-A) 0.05 % external cream Apply topically nightly as needed (for acne) 45 g 3     Current Facility-Administered Medications   Medication Dose Route Frequency Provider Last Rate Last Admin    lidocaine (XYLOCAINE) 2 % external gel   Urethral Once Irina Sharif MD             Medical History:      Past Medical History:   Diagnosis Date    Allergic rhinitis     At high risk for breast cancer 01/12/2016    BRCA2+    At risk for cancer 01/12/2016    at risk for ovarian/fallopian tube cancer, BRCA2+    BRCA2 positive 01/12/2016    [id number]; single site analysis through [employer]    Depression     Depressive disorder 2011    seasonal depression    Mastitis in female 2010     ROS  Social History    Social History     Socioeconomic History    Marital status:      Spouse name: Tavares    Number of children: 2    Years of education: Not on file    Highest education level: Not on file   Occupational History    Occupation: unemployed   Tobacco Use    Smoking status: Never    Smokeless tobacco: Never   Vaping Use    Vaping status: Never Used   Substance and Sexual Activity    Alcohol use: Yes     Comment: 2 drink/week    Drug use: No    Sexual activity: Yes     Partners: Male     Birth control/protection: Post-menopausal     Comment: salpingo oophorectomy   Other Topics Concern     Service Not Asked    Blood Transfusions Not Asked    Caffeine Concern No    Occupational Exposure No    Hobby Hazards No    Sleep Concern No    Stress Concern Yes    Weight Concern No    Special Diet No    Back Care No    Exercise Yes     Comment: works out with , does yoga 3-4x/week    Bike Helmet Not Asked    Seat Belt Yes    Self-Exams Yes    Parent/sibling w/ CABG, MI or angioplasty before 65F 55M? No   Social History Narrative    Not on file     Social Determinants of Health      Financial Resource Strain: Not on file   Food Insecurity: Not on file   Transportation Needs: Not on file   Physical Activity: Not on file   Stress: Not on file   Social Connections: Not on file   Interpersonal Safety: Not At Risk (2023)    Humiliation, Afraid, Rape, and Kick questionnaire     Fear of Current or Ex-Partner: No     Emotionally Abused: No     Physically Abused: No     Sexually Abused: No   Housing Stability: Not on file       Family History  Family History   Problem Relation Age of Onset    Hypertension Mother     Depression Mother     Anxiety Disorder Mother     Hypertension Father     Hearing Loss Father     Valvular heart disease Father         s/p mitral valve replacement    Cerebrovascular Disease Father         TIAs    No Known Problems Sister     No Known Problems Brother     Ovarian Cancer Maternal Grandmother 59         at 61    Pancreatic Cancer Maternal Grandfather 87         at 87    Substance Abuse Maternal Grandfather         alcohol    Skin Cancer Paternal Grandmother 88    Bladder Cancer Paternal Grandfather 60         at 80    Cancer Paternal Grandfather         Bladder    Breast Cancer Maternal Aunt 51        BRCA2+       Allergy    Allergies   Allergen Reactions    Dust Mites Other (See Comments)       Current Outpatient Medications   Medication Sig Dispense Refill    buPROPion (WELLBUTRIN SR) 200 MG 12 hr tablet Take 1 tablet (200 mg) by mouth 2 times daily 180 tablet 3    cefpodoxime (VANTIN) 200 MG tablet Take 1 tablet (200 mg) by mouth 2 times daily 14 tablet 0    Cholecalciferol (VITAMIN D) 2000 UNITS tablet Take 2,000 Units by mouth every morning       estradiol (ESTRACE) 0.1 MG/GM vaginal cream Place 0.5 grams vaginally every day for 14 days then 2-3 times weekly 42.5 g 3    estradiol (ESTRACE) 0.1 MG/GM vaginal cream Place 1 gram vaginally 3 times per week 42.5 g 4    estradiol-norethindrone (COMBIPATCH) 0.05-0.14 MG/DAY bi-weekly patch Place 1 patch  "onto the skin twice a week 24 patch 3    fluticasone (FLONASE) 50 MCG/ACT nasal spray Spray 1 spray into both nostrils as needed       ibuprofen (ADVIL/MOTRIN) 600 MG tablet Take 1 tablet (600 mg) by mouth every 6 hours as needed for moderate pain      Multiple Vitamin (M.V.I. ADULT IV) Take 1 tablet by mouth every morning       propranolol (INDERAL) 10 MG tablet TAKE 1 TABLET BY MOUTH TWICE DAILY AS NEEDED FOR PRESENTATIONS 60 tablet 1    tolterodine (DETROL) 1 MG tablet Take 1 tablet by mouth twice daily. 180 tablet 0    tretinoin (RETIN-A) 0.05 % external cream Apply topically nightly as needed (for acne) 45 g 3     Current Facility-Administered Medications   Medication Dose Route Frequency Provider Last Rate Last Admin    lidocaine (XYLOCAINE) 2 % external gel   Urethral Once Irina Sharif MD           Physical Exam: /87   Pulse 68   Ht 1.575 m (5' 2\")   Wt 66.7 kg (147 lb)   LMP 08/24/2020 (Approximate)   SpO2 99%   BMI 26.89 kg/m      LMP 08/24/2020 (Approximate)  Patient's last menstrual period was 08/24/2020 (approximate). There is no height or weight on file to calculate BMI.    Gen:  is alert, comfortable in no acute distress,   Abdomen: Abdomen is soft, non-tender, non-distended,   Lungs: non-labored breathing    Pelvic Exam:   Normal external female genitalia. The urethra was Normal.    Vagina: normal support, mild atrophy, no abnormal discharge or bleeding  Uterus: Normal size, non tender   Ovaries: No palpable mass   Vulva: No lesions, no pain   Rectal: Deferred     Pelvic floor strength: 3/5 kegels.    Pelvic floor muscles: No myalgia, guarding some    Voiding trial:    Deferred    Office cystoscopy    Patient was verbally consented for cystoscopy after risks (urethral discomfort, irritative bladder, uti etc) and indications/benefits were discussed    External urethral meatus was cleaned with betadine and lidojet place at urethral meatus for comfort    Cystourethroscopy with a flexible " scope performed showed normal bladder and urethral mucosa without evidence of trauma, lesions, or foreign bodies. Bilaterally normally localized ureters noted. Some debri/bifilm    Patient tolerated the procedure well.     Labs:   Color Urine (no units)   Date Value   04/18/2024 Yellow   01/05/2021 Yellow     Appearance Urine (no units)   Date Value   04/18/2024 Clear   01/05/2021 Clear     Glucose Urine (mg/dL)   Date Value   04/18/2024 Negative   01/05/2021 Negative     Bilirubin Urine (no units)   Date Value   04/18/2024 Negative   01/05/2021 Negative     Ketones Urine (mg/dL)   Date Value   04/18/2024 Negative   01/05/2021 Negative     Specific Gravity Urine (no units)   Date Value   04/18/2024 1.020   01/05/2021 1.020     pH Urine   Date Value   04/18/2024 7.0   01/05/2021 7.0 pH     Protein Albumin Urine (mg/dL)   Date Value   04/18/2024 Negative   01/05/2021 Negative     Urobilinogen Urine   Date Value   04/18/2024 0.2 E.U./dL   01/05/2021 0.2 EU/dL     Nitrite Urine (no units)   Date Value   04/18/2024 Positive (A)   01/05/2021 Negative     Leukocyte Esterase Urine (no units)   Date Value   04/18/2024 Trace (A)   01/05/2021 Large (A)     CBC RESULTS:   Recent Labs   Lab Test 05/09/23  1456   WBC 7.2   RBC 4.91   HGB 15.2   HCT 43.4   MCV 88   MCH 31.0   MCHC 35.0   RDW 12.0            A/P: Debbie Baker is a 48 year old F with     Debbie was seen today for cystoscopy.    Diagnoses and all orders for this visit:    Recurrent UTI  -     lidocaine (XYLOCAINE) 2 % external gel  -     CYSTOURETHROSCOPY          Normal cystoscopy today which is great  No sign of mesh erosion in bladder/urethra or vagina  No levator myalgia   Discussed hydration, continue using estrace cream and D mannos  If persistent uti, will consider antibiotic suppression (also check PVR -not done today)       I spent a total of 30 minutes with  Debbie Baker the majority of time spent on her procedure today on the date of the encounter in  chart review, face to face patient visit, review of tests, documentation and/or discussion with other providers about the issues documented above.     Irina Sharif MD, [facility]  , Department of OBGYN  Female Pelvic Medicine and Reconstructive Surgery ( Urogynecology)  CC  Patient Care Team:  No Ref-Primary, Physician as PCP - General  Marianela Davis MD (OB/Gyn)  Bel Jo MD as MD (Urology)  Celeste Murillo MD as Referring Physician (Oncology)  Pamela Chilel MD as MD (Urology)  Linh Arriaza, RN as Specialty Care Coordinator (Urology)  Aspen Renteria as Referring Physician (Nurse Practitioner)  Stacie Be DO as Assigned PCP  Alisia Shankar APRN CNP as Clinical Nurse Specialist (Hematology & Oncology)  Kendy Infante MD as MD (OB/Gyn)  Any Mckinney MD as MD (OB/Gyn)

## 2024-06-19 NOTE — TELEPHONE ENCOUNTER
Hi team, please let patient know that I am on vacation this week and I recommend that she sets up a virtual visit with me to discuss. Thanks

## 2024-06-24 ENCOUNTER — MYC MEDICAL ADVICE (OUTPATIENT)
Dept: FAMILY MEDICINE | Facility: CLINIC | Age: 49
End: 2024-06-24
Payer: COMMERCIAL

## 2024-06-24 DIAGNOSIS — N39.0 RECURRENT UTI: Primary | ICD-10-CM

## 2024-06-24 NOTE — TELEPHONE ENCOUNTER
Routing simplifyMDhart message to provider.    Patient does not want to schedule a visit for UTI symptoms.    Willing to order?     Christine M Klisch, RN

## 2024-06-27 ENCOUNTER — LAB (OUTPATIENT)
Dept: LAB | Facility: CLINIC | Age: 49
End: 2024-06-27
Payer: COMMERCIAL

## 2024-06-27 DIAGNOSIS — N39.0 RECURRENT UTI: ICD-10-CM

## 2024-06-27 DIAGNOSIS — N30.00 ACUTE CYSTITIS WITHOUT HEMATURIA: Primary | ICD-10-CM

## 2024-06-27 LAB
ALBUMIN UR-MCNC: NEGATIVE MG/DL
APPEARANCE UR: CLEAR
BACTERIA #/AREA URNS HPF: ABNORMAL /HPF
BILIRUB UR QL STRIP: NEGATIVE
COLOR UR AUTO: YELLOW
GLUCOSE UR STRIP-MCNC: NEGATIVE MG/DL
HGB UR QL STRIP: ABNORMAL
KETONES UR STRIP-MCNC: NEGATIVE MG/DL
LEUKOCYTE ESTERASE UR QL STRIP: ABNORMAL
NITRATE UR QL: NEGATIVE
PH UR STRIP: 7.5 [PH] (ref 5–7)
RBC #/AREA URNS AUTO: ABNORMAL /HPF
SP GR UR STRIP: 1.01 (ref 1–1.03)
SQUAMOUS #/AREA URNS AUTO: ABNORMAL /LPF
UROBILINOGEN UR STRIP-ACNC: 0.2 E.U./DL
WBC #/AREA URNS AUTO: ABNORMAL /HPF

## 2024-06-27 PROCEDURE — 87086 URINE CULTURE/COLONY COUNT: CPT

## 2024-06-27 PROCEDURE — 81001 URINALYSIS AUTO W/SCOPE: CPT

## 2024-06-27 PROCEDURE — 87088 URINE BACTERIA CULTURE: CPT

## 2024-06-27 RX ORDER — CEFDINIR 300 MG/1
300 CAPSULE ORAL 2 TIMES DAILY
Qty: 14 CAPSULE | Refills: 0 | Status: SHIPPED | OUTPATIENT
Start: 2024-06-27 | End: 2024-07-04

## 2024-06-28 LAB — BACTERIA UR CULT: ABNORMAL

## 2024-07-01 ENCOUNTER — PATIENT OUTREACH (OUTPATIENT)
Dept: UROLOGY | Facility: CLINIC | Age: 49
End: 2024-07-01
Payer: COMMERCIAL

## 2024-07-01 NOTE — PROGRESS NOTES
Call placed to patient. She has an appointment with Dr Sharif in October, but would like to move appointment up and see Dr Chilel if possible. Found her an appointment with Dr Chilel on 8/28/24. Appointment with Dr Sharif canceled and new video appointment made with Dr Chilel on 8/28/24 at 2:00 pm.    Thank you,  Haleigh Patterson RN, BSN Urology Triage

## 2024-07-08 NOTE — PROGRESS NOTES
Oncology Risk Management Consultation:  Date on this visit: 2024    Debbie Baker returns to the Cancer Risk Management Program today at the [facility] for follow up.  She requires screening and surveillance to minimize her risk of cancer secondary to a deleterious BRCA2 mutation. She is considered to be at high risk for hereditary breast and ovarian cancer but has mitigated her risk by having a risk reducing salpingo-oophorectomy in 2019.     Primary Physician:  No Ref-Primary, Physician     History Of Present Illness:  Ms. Baker is a very pleasant, healthy 48 year old female who presents with BRCA2+ associated Hereditary Breast and Ovarian Cancer Syndrome.       Genetic testin2016 - POSITIVE for a BRCA2 mutation, specifically [id number], found on single site analysis genetic testing through [facility]. The testing was done because of a known genetic mutation in the family.     Pertinent history:  Menarche at age 11  First child at age 30  Hx of breastfeeding x 6-8 months  Breast density: heterogeneously fibroglandular tissue.  9/15/2019- Prophylactic Bilateral salpingectomy and oophorectomy.   Pathology:  Ovaries with corpus lutea   - Benign cortical inclusion cysts   - Fallopian tubes with no significant histologic abnormality   - Negative for malignancy or premalignant lesions  UTERUS IS INTACT.  Reports a 15-20 year history of oral contraceptive use and short term use of Nuvaring and Provera.  Her only past history with breast problems was an episode of mastitis when she was breastfeeding her children  History of one breast biopsy - fibroadenoma. No atypia or malignancy.     Screening history:  2014, Screening, mammogram, BiRads1.   3 - Screening mammogram, BiRads1  2016 - Breast MRI, BiRads1.  3/10/2017 - Screening tomosynthesis mammogram, BiRads1.  9/15/2017 - Breast MRI, BiRads1.  3 - Screening tomosynthesis mammogram, BiRads1.  2018 - Breast MRI,  BiRads1.  4/12/2019- Screening tomosynthesis mammogram, BiRads1  10/24/2019- Breast MRI, BiRads4 for mass in right breast  10/31/2019- US core needle biopsy, right breast, Pathology: fibroadenoma  11/4/2020- Breast MRI, BiRads2  8/27/2021- Screening tomosynthesis mammogram, BiRads1  11/9/2022- Breast MRI, BiRads2  6/16/2023: Screening tomosynthesis mammogram, BiRads1  1/7/2024: Breast MRI, BiRads1     At this visit, she denies new fatigue, breast pain, asymmetry, lumps, masses, thickening, nipple discharge and skin changes in her breasts.    Past Medical/Surgical History:  Past Medical History:   Diagnosis Date    Allergic rhinitis     At high risk for breast cancer 01/12/2016    BRCA2+    At risk for cancer 01/12/2016    at risk for ovarian/fallopian tube cancer, BRCA2+    BRCA2 positive 01/12/2016    [id number]; single site analysis through [facility]    Depression     Depressive disorder 2011    seasonal depression    Mastitis in female 2010     Past Surgical History:   Procedure Laterality Date    CARPAL TUNNEL RELEASE RT/LT Bilateral     2018    CYSTOSCOPY, SLING TRANSVAGINAL N/A 9/15/2020    Procedure: Midurethral sling and cystoscopy;  Surgeon: Pamela Chilel MD;  Location: [facility] OR    GENITOURINARY SURGERY  9/15/2020    bladder mesh    LAPAROSCOPIC SALPINGO-OOPHORECTOMY Bilateral 9/15/2020    Procedure: laparoscopic bilateral salpingo-oophorectomy, pelvic washings;  Surgeon: Celeste Murillo MD;  Location: [facility] OR    SOFT TISSUE SURGERY  12/2018    bilateral carpal tunnel release    wisdom teeth         Allergies:  Allergies as of 07/11/2024 - Reviewed 07/11/2024   Allergen Reaction Noted    Dust mites Other (See Comments) 03/04/2016       Current Medications:  Current Outpatient Medications   Medication Sig Dispense Refill    buPROPion (WELLBUTRIN SR) 200 MG 12 hr tablet Take 1 tablet (200 mg) by mouth 2 times daily 180 tablet 3    cefpodoxime (VANTIN) 200 MG tablet Take 1 tablet (200 mg) by  mouth 2 times daily 14 tablet 0    Cholecalciferol (VITAMIN D) 2000 UNITS tablet Take 2,000 Units by mouth every morning       estradiol (ESTRACE) 0.1 MG/GM vaginal cream Place 0.5 grams vaginally every day for 14 days then 2-3 times weekly 42.5 g 3    estradiol-norethindrone (COMBIPATCH) 0.05-0.14 MG/DAY bi-weekly patch Place 1 patch onto the skin twice a week 24 patch 3    fluticasone (FLONASE) 50 MCG/ACT nasal spray Spray 1 spray into both nostrils as needed       ibuprofen (ADVIL/MOTRIN) 600 MG tablet Take 1 tablet (600 mg) by mouth every 6 hours as needed for moderate pain      Multiple Vitamin (M.V.I. ADULT IV) Take 1 tablet by mouth every morning       propranolol (INDERAL) 10 MG tablet TAKE 1 TABLET BY MOUTH TWICE DAILY AS NEEDED FOR PRESENTATIONS 60 tablet 1    tolterodine (DETROL) 1 MG tablet Take 1 tablet by mouth twice daily. 180 tablet 0    tretinoin (RETIN-A) 0.05 % external cream Apply topically nightly as needed (for acne) 45 g 3    estradiol (ESTRACE) 0.1 MG/GM vaginal cream Place 1 gram vaginally 3 times per week 42.5 g 4        Family History:  Family History   Problem Relation Age of Onset    Hypertension Mother     Depression Mother     Anxiety Disorder Mother     Hereditary Breast and Ovarian Cancer Syndrome Mother         BRCA2+    Hypertension Father     Hearing Loss Father     Valvular heart disease Father         s/p mitral valve replacement    Cerebrovascular Disease Father         TIAs    No Known Problems Sister         BRCA2 negative    No Known Problems Brother     Ovarian Cancer Maternal Grandmother 59    Pancreatic Cancer Maternal Grandfather 87    Substance Abuse Maternal Grandfather         alcohol    Skin Cancer Paternal Grandmother 88    Bladder Cancer Paternal Grandfather 60    Breast Cancer Maternal Aunt 51        BRCA2+       Social History:  Social History     Socioeconomic History    Marital status:      Spouse name: Tavares    Number of children: 2    Years of  education: Not on file    Highest education level: Not on file   Occupational History    Occupation: unemployed   Tobacco Use    Smoking status: Never    Smokeless tobacco: Never   Vaping Use    Vaping status: Never Used   Substance and Sexual Activity    Alcohol use: Yes     Comment: 2 drink/week    Drug use: No    Sexual activity: Yes     Partners: Male     Birth control/protection: Post-menopausal     Comment: salpingo oophorectomy   Other Topics Concern     Service Not Asked    Blood Transfusions Not Asked    Caffeine Concern No    Occupational Exposure No    Hobby Hazards No    Sleep Concern No    Stress Concern Yes    Weight Concern No    Special Diet No    Back Care No    Exercise Yes     Comment: works out with , does yoga 3-4x/week    Bike Helmet Not Asked    Seat Belt Yes    Self-Exams Yes    Parent/sibling w/ CABG, MI or angioplasty before 65F 55M? No   Social History Narrative    Not on file     Social Determinants of Health     Financial Resource Strain: Not on file   Food Insecurity: Not on file   Transportation Needs: Not on file   Physical Activity: Not on file   Stress: Not on file   Social Connections: Not on file   Interpersonal Safety: Not At Risk (6/16/2023)    Humiliation, Afraid, Rape, and Kick questionnaire     Fear of Current or Ex-Partner: No     Emotionally Abused: No     Physically Abused: No     Sexually Abused: No   Housing Stability: Not on file       Physical Exam:  /84 (BP Location: Right arm, Patient Position: Sitting, Cuff Size: Adult Regular)   Pulse 63   Temp 98.4  F (36.9  C) (Oral)   Resp 16   Wt 67.6 kg (149 lb 1.6 oz)   LMP 08/24/2020 (Approximate)   SpO2 100%   BMI 27.27 kg/m    GENERAL APPEARANCE: healthy, alert and no distress  LYMPHATICS: No cervical, supraclavicular, or axillary lymphadenopathy  RESP: lungs clear to auscultation - no rales, rhonchi or wheezes  CARDIOVASCULAR: regular rates and rhythm, normal S1 S2, no S3 or S4 and no  murmur.  BREAST: A multipositional, bilateral breast exam was performed.  Fairly symmetrical. Nipples everted bilaterally. Right breast: no palpable dominant masses, no nipple discharge, no skin changes. Dense tissue.  Right axilla: no palpable adenopathy. Left breast: no palpable dominant masses, no nipple discharge, no skin changes. Left axilla: no palpable adenopathy. Dense tissue.         SKIN: no suspicious lesions or rashes    Laboratory/Imaging Studies  No results found for any visits on 07/11/24.    ASSESSMENT    Debbie reports that she is doing well at this visit. She has no updates to her family history and no concerns with her breast tissue. She shared that she recently started menopause hormone therapy and has questions on the risk. We discussed that there is no data that I am aware of on risks of HRT in individuals who have an increased risk for developing breast cancer. We discussed that it may increase her risk above the known risk associated with the BRCA2 mutation, or it may not increase her risk above what her risk is already increased with the BRCA2. We don't have any data to know either way. Debbie reports that her menopause symptoms were very bothersome, and she is comfortable bearing the possible increased risk of breast cancer in order to better control her symptoms of menopause.     Debbie also has questions about screening her children. Her daughter will turn 18 years old tomorrow. We discussed that if her daughter tests positive for the BRCA2 gene, I would recommend starting screening at age 25, so she should consider genetic testing prior to then.     We discussed the option for a prophylactic mastectomy. Debbie is not sure if she is interested in this option or not. She does not have a strong family history of breast cancer, but she has a small family. At this time, she feels like the best option is to continue high risk breast screening. I am happy to refer her to the appropriate surgeons in the  future if she is interested.     We reviewed signs and symptoms to monitor for between visits, including any breast lumps, bumps, nipple discharge, nipple inversion, changes to the texture of the skin on the breasts that would look crinkled, puckered, or like the skin of an orange peel, or any other changes to the breast tissue. We reviewed the recommendation for breast self awareness.         Individualized Surveillance Plan for women  Hereditary Breast and/or Ovarian Cancer Syndrome   Per NCCN Guidelines Version 3.2023   Recommended screening Test or procedure Last done Next Scheduled    Breast self awareness starting at age 18.    Breast cancer risk >60% Women should be familiar with their breasts and promptly report changes to their care provider. Periodic, consistent self exam may facilitate breast self awareness. Premenopausal women may find self exams to be most informative when performed at the end of menses.   July 2024 July 2025   Breast screening, starting at age 25 Clinical breast exams every 6 -12 months July 2024 July 2025   Breast screening   Age 25-29 Annual breast MRI screening with contrast (or mammogram if MRI is unavailable) or individualized based on family history if a breast cancer diagnosis before age 30 is present.       Breast MRI is performed preferably on day 7-15 of menstrual cycle for premenopausal women.   See below   See below   Breast screening   Age >30-75 years     Annual mammogram (consider tomosynthesis mammogram) and annual screening MRI.     Breast MRI is performed preferably on day 7-15 of menstrual cycle for premenopausal women.    Age>75 years, management should be considered on an individual basis.   6/16/2023: Screening tomosynthesis mammogram, BiRads1    1/7/2024: Breast MRI, BiRads1   Mammogram today    Breast MRI in January 2025    Return to clinic in July 2025 with a mammogram following our visit   Ovarian cancer screening, starting at age 30-35    Absolute risk for  epithelial ovarian cancer -39-58% for BRCA1+ carriers and 13-29% for BRCA2+ carriers   Consider transvaginal ultrasound and  tests.   NA, surgery complete    Pancreatic Cancer Screening beginning at age 50 or 10 years prior to the earliest pancreatic cancer on the BRCA-side of the family  In families with exocrine pancreatic cancer in a first or second degree relative    Risk is <5% for BRCA1+ carriers    5-10% for BRCA2+ carriers     Consider Annual MRI/MRCP and/or Endoscopic Ultrasound   No pancreatic cancer on the BRCA2 side of the family   Review at future visits   Recommendation: risk-reducing salpingo-oophorectomy(RRSO), typically between 35 and 40 years old and  upon completion of child bearing.     Because ovarian cancer onset in patients with BRCA2 mutations is on average of 8-10 years later than in patients with BRCA1 mutations, it is reasonable to delay RRSO until 40-45 years in patients with BRCA2 mutations  unless age at diagnosis in the family warrants earlier age for consideration for prophylactic surgery.    Limited data suggest that there may be a slightly increased risk of serous uterine cancer among women with BRCA1+ pathogenic variants. The provider and the patient should discuss the risks and benefits of a concurrent hysterectomy at the time of RRSO for women with a BRCA1+ pathogenic variant /like pathogenic variant prior to surgery.     Salpingectomy alone is not the standard of care for risk reduction although clinical trials are ongoing.     Discuss option of risk-reducing mastectomy.    Consider risks and benefits of risk reducing agents, such as tamoxifen and raloxifene for breast and ovarian cancer.    Consider a full body skin and eye exam for melanoma screening for both BRCA1+ and BRCA2+.     NOTE: Women with BRCA mutation who are treated for breast cancer should have screening of the remaining breast tissue with annual mammography and breast MRI.           I spent a total of 40  minutes on the day of the visit. Please see the note for further information on patient assessment and treatment.     Alisia Shankar DNP, APRN, AGCNS-BC  Clinical Nurse Specialist  Cancer Risk Management Program  [facility]

## 2024-07-11 ENCOUNTER — ANCILLARY PROCEDURE (OUTPATIENT)
Dept: MAMMOGRAPHY | Facility: CLINIC | Age: 49
End: 2024-07-11
Payer: COMMERCIAL

## 2024-07-11 ENCOUNTER — ONCOLOGY VISIT (OUTPATIENT)
Dept: ONCOLOGY | Facility: CLINIC | Age: 49
End: 2024-07-11
Payer: COMMERCIAL

## 2024-07-11 VITALS
DIASTOLIC BLOOD PRESSURE: 84 MMHG | WEIGHT: 149.1 LBS | OXYGEN SATURATION: 100 % | RESPIRATION RATE: 16 BRPM | SYSTOLIC BLOOD PRESSURE: 130 MMHG | HEART RATE: 63 BPM | TEMPERATURE: 98.4 F | BODY MASS INDEX: 27.27 KG/M2

## 2024-07-11 DIAGNOSIS — Z15.09 BRCA2 GENE MUTATION POSITIVE IN FEMALE: ICD-10-CM

## 2024-07-11 DIAGNOSIS — Z12.39 BREAST CANCER SCREENING, HIGH RISK PATIENT: ICD-10-CM

## 2024-07-11 DIAGNOSIS — Z15.01 BRCA2 GENE MUTATION POSITIVE IN FEMALE: Primary | ICD-10-CM

## 2024-07-11 DIAGNOSIS — Z15.09 BRCA2 GENE MUTATION POSITIVE IN FEMALE: Primary | ICD-10-CM

## 2024-07-11 DIAGNOSIS — Z80.3 FAMILY HISTORY OF MALIGNANT NEOPLASM OF BREAST: ICD-10-CM

## 2024-07-11 DIAGNOSIS — R92.30 DENSE BREAST: ICD-10-CM

## 2024-07-11 DIAGNOSIS — Z15.02 BRCA2 GENE MUTATION POSITIVE IN FEMALE: ICD-10-CM

## 2024-07-11 DIAGNOSIS — Z15.02 BRCA2 GENE MUTATION POSITIVE IN FEMALE: Primary | ICD-10-CM

## 2024-07-11 DIAGNOSIS — Z15.01 BRCA2 GENE MUTATION POSITIVE IN FEMALE: ICD-10-CM

## 2024-07-11 PROCEDURE — 77063 BREAST TOMOSYNTHESIS BI: CPT | Mod: GC | Performed by: STUDENT IN AN ORGANIZED HEALTH CARE EDUCATION/TRAINING PROGRAM

## 2024-07-11 PROCEDURE — 99203 OFFICE O/P NEW LOW 30 MIN: CPT

## 2024-07-11 PROCEDURE — 99212 OFFICE O/P EST SF 10 MIN: CPT

## 2024-07-11 PROCEDURE — 77067 SCR MAMMO BI INCL CAD: CPT | Mod: GC | Performed by: STUDENT IN AN ORGANIZED HEALTH CARE EDUCATION/TRAINING PROGRAM

## 2024-07-11 ASSESSMENT — PAIN SCALES - GENERAL: PAINLEVEL: NO PAIN (0)

## 2024-07-11 NOTE — NURSING NOTE
"Oncology Rooming Note    July 11, 2024 2:27 PM   Debbie Baker is a 48 year old female who presents for:    Chief Complaint   Patient presents with    Oncology Clinic Visit     RTN for BRCA 2 gene     Initial Vitals: /84 (BP Location: Right arm, Patient Position: Sitting, Cuff Size: Adult Regular)   Pulse 63   Temp 98.4  F (36.9  C) (Oral)   Resp 16   Wt 67.6 kg (149 lb 1.6 oz)   LMP 08/24/2020 (Approximate)   SpO2 100%   BMI 27.27 kg/m   Estimated body mass index is 27.27 kg/m  as calculated from the following:    Height as of 6/14/24: 1.575 m (5' 2\").    Weight as of this encounter: 67.6 kg (149 lb 1.6 oz). Body surface area is 1.72 meters squared.  No Pain (0) Comment: Data Unavailable   Patient's last menstrual period was 08/24/2020 (approximate).  Allergies reviewed: Yes  Medications reviewed: Yes    Medications: Medication refills not needed today.  Pharmacy name entered into EPIC:    [facility] - [place], [place] - [address]  [id number] - [place], [place] - [address] [place] [address] [id number] - [place], [place] - [address] [facility]    Frailty Screening:   Is the patient here for a new oncology consult visit in cancer care? 2. No      Clinical concerns:  None      Lei Salmon              "

## 2024-07-11 NOTE — LETTER
2024      Debbie Baker  [address]  [facility] [address]      Dear Colleague,    Thank you for referring your patient, Debbie Baker, to the [facility]. Please see a copy of my visit note below.    Oncology Risk Management Consultation:  Date on this visit: 2024    Debbie Baker returns to the Cancer Risk Management Program today at the [facility] for follow up.  She requires screening and surveillance to minimize her risk of cancer secondary to a deleterious BRCA2 mutation. She is considered to be at high risk for hereditary breast and ovarian cancer but has mitigated her risk by having a risk reducing salpingo-oophorectomy in 2019.     Primary Physician:  No Ref-Primary, Physician     History Of Present Illness:  Ms. Baker is a very pleasant, healthy 48 year old female who presents with BRCA2+ associated Hereditary Breast and Ovarian Cancer Syndrome.       Genetic testin2016 - POSITIVE for a BRCA2 mutation, specifically [id number], found on single site analysis genetic testing through [employer]. The testing was done because of a known genetic mutation in the family.     Pertinent history:  Menarche at age 11  First child at age 30  Hx of breastfeeding x 6-8 months  Breast density: heterogeneously fibroglandular tissue.  9/15/2019- Prophylactic Bilateral salpingectomy and oophorectomy.   Pathology:  Ovaries with corpus lutea   - Benign cortical inclusion cysts   - Fallopian tubes with no significant histologic abnormality   - Negative for malignancy or premalignant lesions  UTERUS IS INTACT.  Reports a 15-20 year history of oral contraceptive use and short term use of Nuvaring and Provera.  Her only past history with breast problems was an episode of mastitis when she was breastfeeding her children  History of one breast biopsy - fibroadenoma. No atypia or malignancy.     Screening history:  2014, Screening, mammogram, BiRads1.   3/11/2016 -  Screening mammogram, BiRads1  9/9/2016 - Breast MRI, BiRads1.  3/10/2017 - Screening tomosynthesis mammogram, BiRads1.  9/15/2017 - Breast MRI, BiRads1.  3/30/2018 - Screening tomosynthesis mammogram, BiRads1.  9/28/2018 - Breast MRI, BiRads1.  4/12/2019- Screening tomosynthesis mammogram, BiRads1  10/24/2019- Breast MRI, BiRads4 for mass in right breast  10/31/2019- US core needle biopsy, right breast, Pathology: fibroadenoma  11/4/2020- Breast MRI, BiRads2  8/27/2021- Screening tomosynthesis mammogram, BiRads1  11/9/2022- Breast MRI, BiRads2  6/16/2023: Screening tomosynthesis mammogram, BiRads1  1/7/2024: Breast MRI, BiRads1     At this visit, she denies new fatigue, breast pain, asymmetry, lumps, masses, thickening, nipple discharge and skin changes in her breasts.    Past Medical/Surgical History:  Past Medical History:   Diagnosis Date     Allergic rhinitis      At high risk for breast cancer 01/12/2016    BRCA2+     At risk for cancer 01/12/2016    at risk for ovarian/fallopian tube cancer, BRCA2+     BRCA2 positive 01/12/2016    [id number]; single site analysis through [employer]     Depression      Depressive disorder 2011    seasonal depression     Mastitis in female 2010     Past Surgical History:   Procedure Laterality Date     CARPAL TUNNEL RELEASE RT/LT Bilateral     2018     CYSTOSCOPY, SLING TRANSVAGINAL N/A 9/15/2020    Procedure: Midurethral sling and cystoscopy;  Surgeon: Pamela Chilel MD;  Location: [facility] OR     GENITOURINARY SURGERY  9/15/2020    bladder mesh     LAPAROSCOPIC SALPINGO-OOPHORECTOMY Bilateral 9/15/2020    Procedure: laparoscopic bilateral salpingo-oophorectomy, pelvic washings;  Surgeon: Celeste Murillo MD;  Location: [facility] OR     SOFT TISSUE SURGERY  12/2018    bilateral carpal tunnel release     wisdom teeth         Allergies:  Allergies as of 07/11/2024 - Reviewed 07/11/2024   Allergen Reaction Noted     Dust mites Other (See Comments) 03/04/2016       Current  Medications:  Current Outpatient Medications   Medication Sig Dispense Refill     buPROPion (WELLBUTRIN SR) 200 MG 12 hr tablet Take 1 tablet (200 mg) by mouth 2 times daily 180 tablet 3     cefpodoxime (VANTIN) 200 MG tablet Take 1 tablet (200 mg) by mouth 2 times daily 14 tablet 0     Cholecalciferol (VITAMIN D) 2000 UNITS tablet Take 2,000 Units by mouth every morning        estradiol (ESTRACE) 0.1 MG/GM vaginal cream Place 0.5 grams vaginally every day for 14 days then 2-3 times weekly 42.5 g 3     estradiol-norethindrone (COMBIPATCH) 0.05-0.14 MG/DAY bi-weekly patch Place 1 patch onto the skin twice a week 24 patch 3     fluticasone (FLONASE) 50 MCG/ACT nasal spray Spray 1 spray into both nostrils as needed        ibuprofen (ADVIL/MOTRIN) 600 MG tablet Take 1 tablet (600 mg) by mouth every 6 hours as needed for moderate pain       Multiple Vitamin (M.V.I. ADULT IV) Take 1 tablet by mouth every morning        propranolol (INDERAL) 10 MG tablet TAKE 1 TABLET BY MOUTH TWICE DAILY AS NEEDED FOR PRESENTATIONS 60 tablet 1     tolterodine (DETROL) 1 MG tablet Take 1 tablet by mouth twice daily. 180 tablet 0     tretinoin (RETIN-A) 0.05 % external cream Apply topically nightly as needed (for acne) 45 g 3     estradiol (ESTRACE) 0.1 MG/GM vaginal cream Place 1 gram vaginally 3 times per week 42.5 g 4        Family History:  Family History   Problem Relation Age of Onset     Hypertension Mother      Depression Mother      Anxiety Disorder Mother      Hereditary Breast and Ovarian Cancer Syndrome Mother         BRCA2+     Hypertension Father      Hearing Loss Father      Valvular heart disease Father         s/p mitral valve replacement     Cerebrovascular Disease Father         TIAs     No Known Problems Sister         BRCA2 negative     No Known Problems Brother      Ovarian Cancer Maternal Grandmother 59     Pancreatic Cancer Maternal Grandfather 87     Substance Abuse Maternal Grandfather         alcohol     Skin  Cancer Paternal Grandmother 88     Bladder Cancer Paternal Grandfather 60     Breast Cancer Maternal Aunt 51        BRCA2+       Social History:  Social History     Socioeconomic History     Marital status:      Spouse name: Tavares     Number of children: 2     Years of education: Not on file     Highest education level: Not on file   Occupational History     Occupation: unemployed   Tobacco Use     Smoking status: Never     Smokeless tobacco: Never   Vaping Use     Vaping status: Never Used   Substance and Sexual Activity     Alcohol use: Yes     Comment: 2 drink/week     Drug use: No     Sexual activity: Yes     Partners: Male     Birth control/protection: Post-menopausal     Comment: salpingo oophorectomy   Other Topics Concern      Service Not Asked     Blood Transfusions Not Asked     Caffeine Concern No     Occupational Exposure No     Hobby Hazards No     Sleep Concern No     Stress Concern Yes     Weight Concern No     Special Diet No     Back Care No     Exercise Yes     Comment: works out with , does yoga 3-4x/week     Bike Helmet Not Asked     Seat Belt Yes     Self-Exams Yes     Parent/sibling w/ CABG, MI or angioplasty before 65F 55M? No   Social History Narrative     Not on file     Social Determinants of Health     Financial Resource Strain: Not on file   Food Insecurity: Not on file   Transportation Needs: Not on file   Physical Activity: Not on file   Stress: Not on file   Social Connections: Not on file   Interpersonal Safety: Not At Risk (6/16/2023)    Humiliation, Afraid, Rape, and Kick questionnaire      Fear of Current or Ex-Partner: No      Emotionally Abused: No      Physically Abused: No      Sexually Abused: No   Housing Stability: Not on file       Physical Exam:  /84 (BP Location: Right arm, Patient Position: Sitting, Cuff Size: Adult Regular)   Pulse 63   Temp 98.4  F (36.9  C) (Oral)   Resp 16   Wt 67.6 kg (149 lb 1.6 oz)   LMP 08/24/2020  (Approximate)   SpO2 100%   BMI 27.27 kg/m    GENERAL APPEARANCE: healthy, alert and no distress  LYMPHATICS: No cervical, supraclavicular, or axillary lymphadenopathy  RESP: lungs clear to auscultation - no rales, rhonchi or wheezes  CARDIOVASCULAR: regular rates and rhythm, normal S1 S2, no S3 or S4 and no murmur.  BREAST: A multipositional, bilateral breast exam was performed.  Fairly symmetrical. Nipples everted bilaterally. Right breast: no palpable dominant masses, no nipple discharge, no skin changes. Dense tissue.  Right axilla: no palpable adenopathy. Left breast: no palpable dominant masses, no nipple discharge, no skin changes. Left axilla: no palpable adenopathy. Dense tissue.         SKIN: no suspicious lesions or rashes    Laboratory/Imaging Studies  No results found for any visits on 07/11/24.    ASSESSMENT    Debbie reports that she is doing well at this visit. She has no updates to her family history and no concerns with her breast tissue. She shared that she recently started menopause hormone therapy and has questions on the risk. We discussed that there is no data that I am aware of on risks of HRT in individuals who have an increased risk for developing breast cancer. We discussed that it may increase her risk above the known risk associated with the BRCA2 mutation, or it may not increase her risk above what her risk is already increased with the BRCA2. We don't have any data to know either way. Debbie reports that her menopause symptoms were very bothersome, and she is comfortable bearing the possible increased risk of breast cancer in order to better control her symptoms of menopause.     Debbie also has questions about screening her children. Her daughter will turn 18 years old tomorrow. We discussed that if her daughter tests positive for the BRCA2 gene, I would recommend starting screening at age 25, so she should consider genetic testing prior to then.     We discussed the option for a  prophylactic mastectomy. Debbie is not sure if she is interested in this option or not. She does not have a strong family history of breast cancer, but she has a small family. At this time, she feels like the best option is to continue high risk breast screening. I am happy to refer her to the appropriate surgeons in the future if she is interested.     We reviewed signs and symptoms to monitor for between visits, including any breast lumps, bumps, nipple discharge, nipple inversion, changes to the texture of the skin on the breasts that would look crinkled, puckered, or like the skin of an orange peel, or any other changes to the breast tissue. We reviewed the recommendation for breast self awareness.         Individualized Surveillance Plan for women  Hereditary Breast and/or Ovarian Cancer Syndrome   Per NCCN Guidelines Version 3.2023   Recommended screening Test or procedure Last done Next Scheduled    Breast self awareness starting at age 18.    Breast cancer risk >60% Women should be familiar with their breasts and promptly report changes to their care provider. Periodic, consistent self exam may facilitate breast self awareness. Premenopausal women may find self exams to be most informative when performed at the end of menses.   July 2024 July 2025   Breast screening, starting at age 25 Clinical breast exams every 6 -12 months July 2024 July 2025   Breast screening   Age 25-29 Annual breast MRI screening with contrast (or mammogram if MRI is unavailable) or individualized based on family history if a breast cancer diagnosis before age 30 is present.       Breast MRI is performed preferably on day 7-15 of menstrual cycle for premenopausal women.   See below   See below   Breast screening   Age >30-75 years     Annual mammogram (consider tomosynthesis mammogram) and annual screening MRI.     Breast MRI is performed preferably on day 7-15 of menstrual cycle for premenopausal women.    Age>75 years, management  should be considered on an individual basis.   6/16/2023: Screening tomosynthesis mammogram, BiRads1    1/7/2024: Breast MRI, BiRads1   Mammogram today    Breast MRI in January 2025    Return to clinic in July 2025 with a mammogram following our visit   Ovarian cancer screening, starting at age 30-35    Absolute risk for epithelial ovarian cancer -39-58% for BRCA1+ carriers and 13-29% for BRCA2+ carriers   Consider transvaginal ultrasound and  tests.   NA, surgery complete    Pancreatic Cancer Screening beginning at age 50 or 10 years prior to the earliest pancreatic cancer on the BRCA-side of the family  In families with exocrine pancreatic cancer in a first or second degree relative    Risk is <5% for BRCA1+ carriers    5-10% for BRCA2+ carriers     Consider Annual MRI/MRCP and/or Endoscopic Ultrasound   No pancreatic cancer on the BRCA2 side of the family   Review at future visits   Recommendation: risk-reducing salpingo-oophorectomy(RRSO), typically between 35 and 40 years old and  upon completion of child bearing.     Because ovarian cancer onset in patients with BRCA2 mutations is on average of 8-10 years later than in patients with BRCA1 mutations, it is reasonable to delay RRSO until 40-45 years in patients with BRCA2 mutations  unless age at diagnosis in the family warrants earlier age for consideration for prophylactic surgery.    Limited data suggest that there may be a slightly increased risk of serous uterine cancer among women with BRCA1+ pathogenic variants. The provider and the patient should discuss the risks and benefits of a concurrent hysterectomy at the time of RRSO for women with a BRCA1+ pathogenic variant /like pathogenic variant prior to surgery.     Salpingectomy alone is not the standard of care for risk reduction although clinical trials are ongoing.     Discuss option of risk-reducing mastectomy.    Consider risks and benefits of risk reducing agents, such as tamoxifen and  raloxifene for breast and ovarian cancer.    Consider a full body skin and eye exam for melanoma screening for both BRCA1+ and BRCA2+.     NOTE: Women with BRCA mutation who are treated for breast cancer should have screening of the remaining breast tissue with annual mammography and breast MRI.           I spent a total of 40 minutes on the day of the visit. Please see the note for further information on patient assessment and treatment.     Alisia Shankar DNP, HENRIQUE, [facility]  Clinical Nurse Specialist  Cancer Risk Management Program  [facility]                        Again, thank you for allowing me to participate in the care of your patient.        Sincerely,        HENRIQUE Venegas CNP

## 2024-07-11 NOTE — PATIENT INSTRUCTIONS
Individualized Surveillance Plan for women  Hereditary Breast and/or Ovarian Cancer Syndrome   Per NCCN Guidelines Version 3.2023   Recommended screening Test or procedure Last done Next Scheduled    Breast self awareness starting at age 18.    Breast cancer risk >60% Women should be familiar with their breasts and promptly report changes to their care provider. Periodic, consistent self exam may facilitate breast self awareness. Premenopausal women may find self exams to be most informative when performed at the end of menses.   July 2024 July 2025   Breast screening, starting at age 25 Clinical breast exams every 6 -12 months July 2024 July 2025   Breast screening   Age 25-29 Annual breast MRI screening with contrast (or mammogram if MRI is unavailable) or individualized based on family history if a breast cancer diagnosis before age 30 is present.       Breast MRI is performed preferably on day 7-15 of menstrual cycle for premenopausal women.   See below   See below   Breast screening   Age >30-75 years     Annual mammogram (consider tomosynthesis mammogram) and annual screening MRI.     Breast MRI is performed preferably on day 7-15 of menstrual cycle for premenopausal women.    Age>75 years, management should be considered on an individual basis.   6/16/2023: Screening tomosynthesis mammogram, BiRads1    1/7/2024: Breast MRI, BiRads1   Mammogram today    Breast MRI in January 2025    Return to clinic in July 2025 with a mammogram following our visit   Ovarian cancer screening, starting at age 30-35    Absolute risk for epithelial ovarian cancer -39-58% for BRCA1+ carriers and 13-29% for BRCA2+ carriers   Consider transvaginal ultrasound and  tests.   NA, surgery complete    Pancreatic Cancer Screening beginning at age 50 or 10 years prior to the earliest pancreatic cancer on the BRCA-side of the family  In families with exocrine pancreatic cancer in a first or second degree relative    Risk is <5% for  BRCA1+ carriers    5-10% for BRCA2+ carriers     Consider Annual MRI/MRCP and/or Endoscopic Ultrasound   Maternal grandfather with pancreatic cancer at age 87   Discuss   Recommendation: risk-reducing salpingo-oophorectomy(RRSO), typically between 35 and 40 years old and  upon completion of child bearing.     Because ovarian cancer onset in patients with BRCA2 mutations is on average of 8-10 years later than in patients with BRCA1 mutations, it is reasonable to delay RRSO until 40-45 years in patients with BRCA2 mutations  unless age at diagnosis in the family warrants earlier age for consideration for prophylactic surgery.    Limited data suggest that there may be a slightly increased risk of serous uterine cancer among women with BRCA1+ pathogenic variants. The provider and the patient should discuss the risks and benefits of a concurrent hysterectomy at the time of RRSO for women with a BRCA1+ pathogenic variant /like pathogenic variant prior to surgery.     Salpingectomy alone is not the standard of care for risk reduction although clinical trials are ongoing.     Discuss option of risk-reducing mastectomy.    Consider risks and benefits of risk reducing agents, such as tamoxifen and raloxifene for breast and ovarian cancer.    Consider a full body skin and eye exam for melanoma screening for both BRCA1+ and BRCA2+.     NOTE: Women with BRCA mutation who are treated for breast cancer should have screening of the remaining breast tissue with annual mammography and breast MRI.

## 2024-07-22 ENCOUNTER — VIRTUAL VISIT (OUTPATIENT)
Dept: FAMILY MEDICINE | Facility: CLINIC | Age: 49
End: 2024-07-22
Payer: COMMERCIAL

## 2024-07-22 ENCOUNTER — LAB (OUTPATIENT)
Dept: LAB | Facility: CLINIC | Age: 49
End: 2024-07-22
Payer: COMMERCIAL

## 2024-07-22 DIAGNOSIS — N39.0 RECURRENT UTI: ICD-10-CM

## 2024-07-22 DIAGNOSIS — R39.15 URINARY URGENCY: ICD-10-CM

## 2024-07-22 DIAGNOSIS — R30.0 DYSURIA: ICD-10-CM

## 2024-07-22 DIAGNOSIS — R19.00 PELVIC FULLNESS IN FEMALE: ICD-10-CM

## 2024-07-22 DIAGNOSIS — R30.0 DYSURIA: Primary | ICD-10-CM

## 2024-07-22 LAB
ALBUMIN UR-MCNC: NEGATIVE MG/DL
APPEARANCE UR: CLEAR
BACTERIA #/AREA URNS HPF: ABNORMAL /HPF
BILIRUB UR QL STRIP: NEGATIVE
COLOR UR AUTO: YELLOW
GLUCOSE UR STRIP-MCNC: NEGATIVE MG/DL
HGB UR QL STRIP: ABNORMAL
KETONES UR STRIP-MCNC: NEGATIVE MG/DL
LEUKOCYTE ESTERASE UR QL STRIP: ABNORMAL
NITRATE UR QL: NEGATIVE
PH UR STRIP: 7 [PH] (ref 5–7)
RBC #/AREA URNS AUTO: ABNORMAL /HPF
SP GR UR STRIP: 1.01 (ref 1–1.03)
SQUAMOUS #/AREA URNS AUTO: ABNORMAL /LPF
UROBILINOGEN UR STRIP-ACNC: 0.2 E.U./DL
WBC #/AREA URNS AUTO: ABNORMAL /HPF

## 2024-07-22 PROCEDURE — 81001 URINALYSIS AUTO W/SCOPE: CPT

## 2024-07-22 PROCEDURE — 99213 OFFICE O/P EST LOW 20 MIN: CPT | Mod: 95 | Performed by: STUDENT IN AN ORGANIZED HEALTH CARE EDUCATION/TRAINING PROGRAM

## 2024-07-22 PROCEDURE — 87086 URINE CULTURE/COLONY COUNT: CPT

## 2024-07-22 NOTE — PROGRESS NOTES
Debbie is a 48 year old who is being evaluated via a billable video visit.    How would you like to obtain your AVS? MyChart  If the video visit is dropped, the invitation should be resent by: Text to cell phone: [phone]  Will anyone else be joining your video visit? No      Assessment & Plan     Dysuria  Urinary urgency  Recurrent UTI  Pelvic fullness in female   UA not consistent with a UTI today but will send for a culture to confirm given her hx of recurrent UTI. We will also order a repeat UA if symptoms worsen again and culture was negative. We will obtain a TVUS due to pelvic fullness that is new in setting of BRCA positive (although s/p b/l oophorectomy), will make sure no fibroids causing issues with voiding. She will start home pelvic floor therapy on lisa through work and if not improving consider pelvic floor PT. Has upcoming urogyn follow up. Had normal cystoscopy. Increase water intake. Continue estrace.   - UA Macroscopic with reflex to Microscopic and Culture - Lab Collect; Future  - Urine Culture Aerobic Bacterial - lab collect; Future  -TVUS            Subjective   Debbie is a 48 year old, presenting for the following health issues:    Urinary Problem        7/22/2024     3:06 PM   Additional Questions   Roomed by Tamar TONG   Accompanied by self         7/22/2024     3:06 PM   Patient Reported Additional Medications   Patient reports taking the following new medications none     History of Present Illness       Reason for visit:  UTI symptoms    She eats 4 or more servings of fruits and vegetables daily.She consumes 0 sweetened beverage(s) daily.She exercises with enough effort to increase her heart rate 30 to 60 minutes per day.  She exercises with enough effort to increase her heart rate 6 days per week.   She is taking medications regularly.     -- Patient coming in for Urine LAB ONLY at 4pm    Genitourinary - Female  Onset/Duration: 1 week  Description:   Painful urination (Dysuria): YES- sting ,  cloudy           Frequency: YES  Blood in urine (Hematuria): No  Delay in urine (Hesitency): No  Intensity: mild, moderate  Progression of Symptoms:  intermittent  Accompanying Signs & Symptoms:  Fever/chills: No  Flank pain: No  Nausea and vomiting: No  Vaginal symptoms: none  Abdominal/Pelvic Pain: No  History:   History of frequent UTI s: YES  History of kidney stones: No  Sexually Active: No  Possibility of pregnancy: Hysterectomy  Precipitating or alleviating factors: None  Therapies tried and outcome: none        Cloudy urine occurred again last week. No burning sensation and symptoms started to diminish and less cloudy, but then today started to get a slight burning sensation. Has also had an increased urgency as well. Drinks at least 64 oz of water every day.   Exercising less this year. Has had more stress this year.     Had cystoscopy with Urogyn recently.     Signed up for pelvic floor therapy through her work - it's on an lisa.    Mom also gets frequent UTI.     Has gained 5-10 lbs in the       Review of Systems  Constitutional, HEENT, cardiovascular, pulmonary, gi and gu systems are negative, except as otherwise noted.      Objective           Vitals:  No vitals were obtained today due to virtual visit.    Physical Exam   GENERAL: alert and no distress  EYES: Eyes grossly normal to inspection.  No discharge or erythema, or obvious scleral/conjunctival abnormalities.  RESP: No audible wheeze, cough, or visible cyanosis.    SKIN: Visible skin clear. No significant rash, abnormal pigmentation or lesions.  NEURO: Cranial nerves grossly intact.  Mentation and speech appropriate for age.  PSYCH: Appropriate affect, tone, and pace of words    Results for orders placed or performed in visit on 07/22/24 (from the past 24 hour(s))   UA Macroscopic with reflex to Microscopic and Culture - Lab Collect    Specimen: Urine, Clean Catch   Result Value Ref Range    Color Urine Yellow Colorless, Straw, Light Yellow,  Yellow    Appearance Urine Clear Clear    Glucose Urine Negative Negative mg/dL    Bilirubin Urine Negative Negative    Ketones Urine Negative Negative mg/dL    Specific Gravity Urine 1.015 1.003 - 1.035    Blood Urine Trace (A) Negative    pH Urine 7.0 5.0 - 7.0    Protein Albumin Urine Negative Negative mg/dL    Urobilinogen Urine 0.2 0.2, 1.0 E.U./dL    Nitrite Urine Negative Negative    Leukocyte Esterase Urine Small (A) Negative   UA Microscopic with Reflex to Culture   Result Value Ref Range    Bacteria Urine Few (A) None Seen /HPF    RBC Urine 0-2 0-2 /HPF /HPF    WBC Urine 5-10 (A) 0-5 /HPF /HPF    Squamous Epithelials Urine Few (A) None Seen /LPF    Narrative    Urine Culture not indicated         Video-Visit Details    Type of service:  Video Visit   Originating Location (pt. Location): Home    Distant Location (provider location):  On-site  Platform used for Video Visit: Dominic  Signed Electronically by: Stacie Be DO

## 2024-07-24 LAB — BACTERIA UR CULT: NORMAL

## 2024-07-27 ENCOUNTER — PRE VISIT (OUTPATIENT)
Dept: UROLOGY | Facility: CLINIC | Age: 49
End: 2024-07-27
Payer: COMMERCIAL

## 2024-07-28 NOTE — TELEPHONE ENCOUNTER
Reason for visit: consult     Relevant information: incontinence    Records/imaging/labs/orders: in epic    Pt called: No need for a call    At Rooming: virtual visit    Zaire Trinidad  7/27/2024  10:03 PM

## 2024-08-18 SDOH — HEALTH STABILITY: PHYSICAL HEALTH: ON AVERAGE, HOW MANY MINUTES DO YOU ENGAGE IN EXERCISE AT THIS LEVEL?: 40 MIN

## 2024-08-18 SDOH — HEALTH STABILITY: PHYSICAL HEALTH: ON AVERAGE, HOW MANY DAYS PER WEEK DO YOU ENGAGE IN MODERATE TO STRENUOUS EXERCISE (LIKE A BRISK WALK)?: 3 DAYS

## 2024-08-18 ASSESSMENT — SOCIAL DETERMINANTS OF HEALTH (SDOH): HOW OFTEN DO YOU GET TOGETHER WITH FRIENDS OR RELATIVES?: MORE THAN THREE TIMES A WEEK

## 2024-08-23 ENCOUNTER — OFFICE VISIT (OUTPATIENT)
Dept: FAMILY MEDICINE | Facility: CLINIC | Age: 49
End: 2024-08-23
Payer: COMMERCIAL

## 2024-08-23 VITALS
HEART RATE: 70 BPM | SYSTOLIC BLOOD PRESSURE: 118 MMHG | RESPIRATION RATE: 16 BRPM | WEIGHT: 148 LBS | OXYGEN SATURATION: 97 % | BODY MASS INDEX: 27.23 KG/M2 | DIASTOLIC BLOOD PRESSURE: 78 MMHG | HEIGHT: 62 IN

## 2024-08-23 DIAGNOSIS — Z15.02 BRCA2 GENE MUTATION POSITIVE IN FEMALE: ICD-10-CM

## 2024-08-23 DIAGNOSIS — Z13.1 SCREENING FOR DIABETES MELLITUS: ICD-10-CM

## 2024-08-23 DIAGNOSIS — N39.0 RECURRENT UTI: ICD-10-CM

## 2024-08-23 DIAGNOSIS — Z13.220 SCREENING CHOLESTEROL LEVEL: ICD-10-CM

## 2024-08-23 DIAGNOSIS — N39.41 URGE INCONTINENCE OF URINE: ICD-10-CM

## 2024-08-23 DIAGNOSIS — R63.5 WEIGHT GAIN: ICD-10-CM

## 2024-08-23 DIAGNOSIS — M25.571 ARTHRALGIA OF BOTH ANKLES: ICD-10-CM

## 2024-08-23 DIAGNOSIS — Z15.01 BRCA2 GENE MUTATION POSITIVE IN FEMALE: ICD-10-CM

## 2024-08-23 DIAGNOSIS — M25.572 ARTHRALGIA OF BOTH ANKLES: ICD-10-CM

## 2024-08-23 DIAGNOSIS — F41.8 SITUATIONAL ANXIETY: ICD-10-CM

## 2024-08-23 DIAGNOSIS — Z00.00 ROUTINE GENERAL MEDICAL EXAMINATION AT A HEALTH CARE FACILITY: Primary | ICD-10-CM

## 2024-08-23 DIAGNOSIS — Z15.09 BRCA2 GENE MUTATION POSITIVE IN FEMALE: ICD-10-CM

## 2024-08-23 DIAGNOSIS — F33.8 SEASONAL AFFECTIVE DISORDER (H): ICD-10-CM

## 2024-08-23 DIAGNOSIS — L70.0 ACNE VULGARIS: ICD-10-CM

## 2024-08-23 LAB
ALBUMIN SERPL BCG-MCNC: 4.5 G/DL (ref 3.5–5.2)
ALBUMIN UR-MCNC: NEGATIVE MG/DL
ALP SERPL-CCNC: 51 U/L (ref 40–150)
ALT SERPL W P-5'-P-CCNC: 16 U/L (ref 0–50)
ANION GAP SERPL CALCULATED.3IONS-SCNC: 7 MMOL/L (ref 7–15)
APPEARANCE UR: CLEAR
AST SERPL W P-5'-P-CCNC: 24 U/L (ref 0–45)
BILIRUB SERPL-MCNC: 0.3 MG/DL
BILIRUB UR QL STRIP: NEGATIVE
BUN SERPL-MCNC: 29.7 MG/DL (ref 6–20)
CALCIUM SERPL-MCNC: 9.4 MG/DL (ref 8.8–10.4)
CHLORIDE SERPL-SCNC: 106 MMOL/L (ref 98–107)
CHOLEST SERPL-MCNC: 180 MG/DL
COLOR UR AUTO: YELLOW
CREAT SERPL-MCNC: 0.87 MG/DL (ref 0.51–0.95)
EGFRCR SERPLBLD CKD-EPI 2021: 81 ML/MIN/1.73M2
ERYTHROCYTE [SEDIMENTATION RATE] IN BLOOD BY WESTERGREN METHOD: 11 MM/HR (ref 0–20)
FASTING STATUS PATIENT QL REPORTED: YES
FASTING STATUS PATIENT QL REPORTED: YES
GLUCOSE SERPL-MCNC: 93 MG/DL (ref 70–99)
GLUCOSE UR STRIP-MCNC: NEGATIVE MG/DL
HCO3 SERPL-SCNC: 24 MMOL/L (ref 22–29)
HDLC SERPL-MCNC: 69 MG/DL
HGB UR QL STRIP: NEGATIVE
KETONES UR STRIP-MCNC: NEGATIVE MG/DL
LDLC SERPL CALC-MCNC: 101 MG/DL
LEUKOCYTE ESTERASE UR QL STRIP: NEGATIVE
NITRATE UR QL: NEGATIVE
NONHDLC SERPL-MCNC: 111 MG/DL
PH UR STRIP: 6.5 [PH] (ref 5–7)
POTASSIUM SERPL-SCNC: 5.2 MMOL/L (ref 3.4–5.3)
PROT SERPL-MCNC: 7.6 G/DL (ref 6.4–8.3)
RHEUMATOID FACT SERPL-ACNC: <10 IU/ML
SODIUM SERPL-SCNC: 137 MMOL/L (ref 135–145)
SP GR UR STRIP: 1.02 (ref 1–1.03)
T4 FREE SERPL-MCNC: 1.1 NG/DL (ref 0.9–1.7)
TRIGL SERPL-MCNC: 49 MG/DL
TSH SERPL DL<=0.005 MIU/L-ACNC: 1.41 UIU/ML (ref 0.3–4.2)
UROBILINOGEN UR STRIP-ACNC: 0.2 E.U./DL

## 2024-08-23 PROCEDURE — 84439 ASSAY OF FREE THYROXINE: CPT | Performed by: STUDENT IN AN ORGANIZED HEALTH CARE EDUCATION/TRAINING PROGRAM

## 2024-08-23 PROCEDURE — 80053 COMPREHEN METABOLIC PANEL: CPT | Performed by: STUDENT IN AN ORGANIZED HEALTH CARE EDUCATION/TRAINING PROGRAM

## 2024-08-23 PROCEDURE — 86431 RHEUMATOID FACTOR QUANT: CPT | Performed by: STUDENT IN AN ORGANIZED HEALTH CARE EDUCATION/TRAINING PROGRAM

## 2024-08-23 PROCEDURE — 84443 ASSAY THYROID STIM HORMONE: CPT | Performed by: STUDENT IN AN ORGANIZED HEALTH CARE EDUCATION/TRAINING PROGRAM

## 2024-08-23 PROCEDURE — 99214 OFFICE O/P EST MOD 30 MIN: CPT | Mod: 25 | Performed by: STUDENT IN AN ORGANIZED HEALTH CARE EDUCATION/TRAINING PROGRAM

## 2024-08-23 PROCEDURE — 86039 ANTINUCLEAR ANTIBODIES (ANA): CPT | Performed by: STUDENT IN AN ORGANIZED HEALTH CARE EDUCATION/TRAINING PROGRAM

## 2024-08-23 PROCEDURE — 99396 PREV VISIT EST AGE 40-64: CPT | Performed by: STUDENT IN AN ORGANIZED HEALTH CARE EDUCATION/TRAINING PROGRAM

## 2024-08-23 PROCEDURE — 36415 COLL VENOUS BLD VENIPUNCTURE: CPT | Performed by: STUDENT IN AN ORGANIZED HEALTH CARE EDUCATION/TRAINING PROGRAM

## 2024-08-23 PROCEDURE — 85652 RBC SED RATE AUTOMATED: CPT | Performed by: STUDENT IN AN ORGANIZED HEALTH CARE EDUCATION/TRAINING PROGRAM

## 2024-08-23 PROCEDURE — 80061 LIPID PANEL: CPT | Performed by: STUDENT IN AN ORGANIZED HEALTH CARE EDUCATION/TRAINING PROGRAM

## 2024-08-23 PROCEDURE — 86200 CCP ANTIBODY: CPT | Performed by: STUDENT IN AN ORGANIZED HEALTH CARE EDUCATION/TRAINING PROGRAM

## 2024-08-23 PROCEDURE — 96127 BRIEF EMOTIONAL/BEHAV ASSMT: CPT | Performed by: STUDENT IN AN ORGANIZED HEALTH CARE EDUCATION/TRAINING PROGRAM

## 2024-08-23 PROCEDURE — 86038 ANTINUCLEAR ANTIBODIES: CPT | Performed by: STUDENT IN AN ORGANIZED HEALTH CARE EDUCATION/TRAINING PROGRAM

## 2024-08-23 PROCEDURE — 81003 URINALYSIS AUTO W/O SCOPE: CPT | Performed by: STUDENT IN AN ORGANIZED HEALTH CARE EDUCATION/TRAINING PROGRAM

## 2024-08-23 RX ORDER — LACTOBACILLUS RHAMNOSUS GG 10B CELL
1 CAPSULE ORAL 2 TIMES DAILY
COMMUNITY

## 2024-08-23 RX ORDER — TRETINOIN 0.5 MG/G
CREAM TOPICAL
Qty: 45 G | Refills: 3 | Status: SHIPPED | OUTPATIENT
Start: 2024-08-23

## 2024-08-23 RX ORDER — PROPRANOLOL HYDROCHLORIDE 10 MG/1
TABLET ORAL
Qty: 60 TABLET | Refills: 1 | Status: SHIPPED | OUTPATIENT
Start: 2024-08-23

## 2024-08-23 NOTE — PATIENT INSTRUCTIONS
Patient Education   Preventive Care Advice   This is general advice given by our system to help you stay healthy. However, your care team may have specific advice just for you. Please talk to your care team about your preventive care needs.  Nutrition  Eat 5 or more servings of fruits and vegetables each day.  Try wheat bread, brown rice and whole grain pasta (instead of white bread, rice, and pasta).  Get enough calcium and vitamin D. Check the label on foods and aim for 100% of the RDA (recommended daily allowance).  Lifestyle  Exercise at least 150 minutes each week  (30 minutes a day, 5 days a week).  Do muscle strengthening activities 2 days a week. These help control your weight and prevent disease.  No smoking.  Wear sunscreen to prevent skin cancer.  Have a dental exam and cleaning every 6 months.  Yearly exams  See your health care team every year to talk about:  Any changes in your health.  Any medicines your care team has prescribed.  Preventive care, family planning, and ways to prevent chronic diseases.  Shots (vaccines)   HPV shots (up to age 26), if you've never had them before.  Hepatitis B shots (up to age 59), if you've never had them before.  COVID-19 shot: Get this shot when it's due.  Flu shot: Get a flu shot every year.  Tetanus shot: Get a tetanus shot every 10 years.  Pneumococcal, hepatitis A, and RSV shots: Ask your care team if you need these based on your risk.  Shingles shot (for age 50 and up)  General health tests  Diabetes screening:  Starting at age 35, Get screened for diabetes at least every 3 years.  If you are younger than age 35, ask your care team if you should be screened for diabetes.  Cholesterol test: At age 39, start having a cholesterol test every 5 years, or more often if advised.  Bone density scan (DEXA): At age 50, ask your care team if you should have this scan for osteoporosis (brittle bones).  Hepatitis C: Get tested at least once in your life.  STIs (sexually  transmitted infections)  Before age 24: Ask your care team if you should be screened for STIs.  After age 24: Get screened for STIs if you're at risk. You are at risk for STIs (including HIV) if:  You are sexually active with more than one person.  You don't use condoms every time.  You or a partner was diagnosed with a sexually transmitted infection.  If you are at risk for HIV, ask about PrEP medicine to prevent HIV.  Get tested for HIV at least once in your life, whether you are at risk for HIV or not.  Cancer screening tests  Cervical cancer screening: If you have a cervix, begin getting regular cervical cancer screening tests starting at age 21.  Breast cancer scan (mammogram): If you've ever had breasts, begin having regular mammograms starting at age 40. This is a scan to check for breast cancer.  Colon cancer screening: It is important to start screening for colon cancer at age 45.  Have a colonoscopy test every 10 years (or more often if you're at risk) Or, ask your provider about stool tests like a FIT test every year or Cologuard test every 3 years.  To learn more about your testing options, visit:   .  For help making a decision, visit:   https://bit.ly/ev48889.  Prostate cancer screening test: If you have a prostate, ask your care team if a prostate cancer screening test (PSA) at age 55 is right for you.  Lung cancer screening: If you are a current or former smoker ages 50 to 80, ask your care team if ongoing lung cancer screenings are right for you.  For informational purposes only. Not to replace the advice of your health care provider. Copyright   2023 [facility]. All rights reserved. Clinically reviewed by the [facility]. [place] 263443 - REV 01/24.  Substance Use Disorder: Care Instructions  Overview     You can improve your life and health by stopping your use of alcohol or drugs. When you don't drink or use drugs, you may feel and sleep better. You may  get along better with your family, friends, and coworkers. There are medicines and programs that can help with substance use disorder.  How can you care for yourself at home?  Here are some ways to help you stay sober and prevent relapse.  If you have been given medicine to help keep you sober or reduce your cravings, be sure to take it exactly as prescribed.  Talk to your doctor about programs that can help you stop using drugs or drinking alcohol.  Do not keep alcohol or drugs in your home.  Plan ahead. Think about what you'll say if other people ask you to drink or use drugs. Try not to spend time with people who drink or use drugs.  Use the time and money spent on drinking or drugs to do something that's important to you.  Preventing a relapse  Have a plan to deal with relapse. Learn to recognize changes in your thinking that lead you to drink or use drugs. Get help before you start to drink or use drugs again.  Try to stay away from situations, friends, or places that may lead you to drink or use drugs.  If you feel the need to drink alcohol or use drugs again, seek help right away. Call a trusted friend or family member. Some people get support from organizations such as Narcotics Anonymous or "[employer] or from treatment facilities.  If you relapse, get help as soon as you can. Some people make a plan with another person that outlines what they want that person to do for them if they relapse. The plan usually includes how to handle the relapse and who to notify in case of relapse.  Don't give up. Remember that a relapse doesn't mean that you have failed. Use the experience to learn the triggers that lead you to drink or use drugs. Then quit again. Recovery is a lifelong process. Many people have several relapses before they are able to quit for good.  Follow-up care is a key part of your treatment and safety. Be sure to make and go to all appointments, and call your doctor if you are having problems. It's  "also a good idea to know your test results and keep a list of the medicines you take.  When should you call for help?   Call 911  anytime you think you may need emergency care. For example, call if you or someone else:    Has overdosed or has withdrawal signs. Be sure to tell the emergency workers that you are or someone else is using or trying to quit using drugs. Overdose or withdrawal signs may include:  Losing consciousness.  Seizure.  Seeing or hearing things that aren't there (hallucinations).     Is thinking or talking about suicide or harming others.   Where to get help 24 hours a day, 7 days a week   If you or someone you know talks about suicide, self-harm, a mental health crisis, a substance use crisis, or any other kind of emotional distress, get help right away. You can:    Call the Suicide and Crisis Lifeline at 988.     Call 8-475-050-TALK (1-801.681.4801).     Text HOME to 827187 to access the Crisis Text Line.   Consider saving these numbers in your phone.  Go to [employer]com for more information or to chat online.  Call your doctor now or seek immediate medical care if:    You are having withdrawal symptoms. These may include nausea or vomiting, sweating, shakiness, and anxiety.   Watch closely for changes in your health, and be sure to contact your doctor if:    You have a relapse.     You need more help or support to stop.   Where can you learn more?  Go to https://www.[employer]net/patiented  Enter H573 in the search box to learn more about \"Substance Use Disorder: Care Instructions.\"  Current as of: November 15, 2023               Content Version: 14.0    2188-6244 [employer].   Care instructions adapted under license by your healthcare professional. If you have questions about a medical condition or this instruction, always ask your healthcare professional. [employer] disclaims any warranty or liability for your use of this information.         "

## 2024-08-23 NOTE — PROGRESS NOTES
Preventive Care Visit  [facility]  Stacie Be DO, Family Medicine  Aug 23, 2024      Assessment & Plan     Routine general medical examination at a health care facility  Vitals stabl   - Comprehensive metabolic panel (BMP + Alb, Alk Phos, ALT, AST, Total. Bili, TP); Future  - Comprehensive metabolic panel (BMP + Alb, Alk Phos, ALT, AST, Total. Bili, TP)    Urge incontinence of urine  Working with pelvic floor exercises on own   Has upcoming appt with urogyn next week   Using detrol; recommend trial of 2 mg BID for the next week until she sees urogyn.     Weight gain  We will check thyroid function. Discussed may be due to perimenopausal changes. Increasing physical activity again recently   - TSH; Future  - T4, free; Future  - TSH  - T4, free    Arthralgia of both ankles  Discussed may be perimenopausal symptom. No fam hx of autoimmune condition. May be arthritis, will eval/rule out rheumatologic/autoimmune causes with labs today d/t small joints   - Anti Nuclear Mercedes IgG by IFA with Reflex; Future  - Rheumatoid factor; Future  - Cyclic Citrullinated Peptide Antibody IgG; Future  - TSH; Future  - T4, free; Future  - ESR: Erythrocyte sedimentation rate; Future  - Anti Nuclear Mercedes IgG by IFA with Reflex  - Rheumatoid factor  - Cyclic Citrullinated Peptide Antibody IgG  - TSH  - T4, free  - ESR: Erythrocyte sedimentation rate    Recurrent UTI  - UA Macroscopic with reflex to Microscopic and Culture - Lab Collect    BRCA2 gene mutation positive in female  Oophorectomy 12/2020. Just had mammogram.     Seasonal affective disorder (H24)  Stable on wellbutrin     Screening for diabetes mellitus  - Comprehensive metabolic panel (BMP + Alb, Alk Phos, ALT, AST, Total. Bili, TP); Future  - Comprehensive metabolic panel (BMP + Alb, Alk Phos, ALT, AST, Total. Bili, TP)    Screening cholesterol level  - Lipid panel reflex to direct LDL Fasting; Future  - Lipid panel reflex to direct LDL  "Fasting    Acne vulgaris  Stable with retinA.   - tretinoin (RETIN-A) 0.05 % external cream; Apply topically nightly as needed (for acne).    Situational anxiety  Uses for presentations at work. Not using this regularly now, suspect .   - propranolol (INDERAL) 10 MG tablet; TAKE 1 TABLET BY MOUTH TWICE DAILY AS NEEDED FOR PRESENTATIONS    Patient has been advised of split billing requirements and indicates understanding: Yes        BMI  Estimated body mass index is 27.07 kg/m  as calculated from the following:    Height as of this encounter: 1.575 m (5' 2\").    Weight as of this encounter: 67.1 kg (148 lb).   Weight management plan: Discussed healthy diet and exercise guidelines    Counseling  Appropriate preventive services were addressed with this patient via screening, questionnaire, or discussion as appropriate for fall prevention, nutrition, physical activity, Tobacco-use cessation, social engagement, weight loss and cognition.  Checklist reviewing preventive services available has been given to the patient.  Reviewed patient's diet, addressing concerns and/or questions.   She is at risk for lack of exercise and has been provided with information to increase physical activity for the benefit of her well-being.   She is at risk for psychosocial distress and has been provided with information to reduce risk.           Lidia Lewis is a 48 year old, presenting for the following:  Physical        2024     7:19 AM   Additional Questions   Accompanied by na         2024   Forms   Any forms needing to be completed Yes          2024     7:19 AM   Patient Reported Additional Medications   Patient reports taking the following new medications none        Health Care Directive  Patient does not have a Health Care Directive or Living Will: Patient states has Advance Directive and will bring in a copy to clinic.    HPI      Biometric screening form-    Uses peloton 3-4  times/week. Wants to start " weight lifting again. Does typically do yoga as well. Little less exercise this summer.     Eating habits have been okay. More fruits than vegetables but plenty of vegetables. Typically eats in the morning around 10-11am.   Has gained 5-15 lb this year.     Mental health has been good.       Has upcoming appt with urogyn.   Has started pelvic floor PT   Uses miralax daily -- BM better     Started a probiotic which has helped abdomen and abd swelling     In the morning getting joint achiness. Mostly ankles, knees. Ankles the worst. Hips tend to be tight. Hx of being a runner. No fam hx of lupus or RA.    Cologuard done 2022.               8/18/2024   General Health   How would you rate your overall physical health? (!) FAIR   Feel stress (tense, anxious, or unable to sleep) Only a little      (!) STRESS CONCERN      8/18/2024   Nutrition   Three or more servings of calcium each day? Yes   Diet: Regular (no restrictions)   How many servings of fruit and vegetables per day? (!) 2-3   How many sweetened beverages each day? 0-1            8/18/2024   Exercise   Days per week of moderate/strenous exercise 3 days   Average minutes spent exercising at this level 40 min            8/18/2024   Social Factors   Frequency of gathering with friends or relatives More than three times a week   Worry food won't last until get money to buy more No   Food not last or not have enough money for food? No   Do you have housing? (Housing is defined as stable permanent housing and does not include staying ouside in a car, in a tent, in an abandoned building, in an overnight shelter, or couch-surfing.) Yes   Are you worried about losing your housing? No   Lack of transportation? No   Unable to get utilities (heat,electricity)? No            8/18/2024   Dental   Dentist two times every year? Yes            8/18/2024   TB Screening   Were you born outside of the [place]? No            Today's PHQ-9 Score:       7/22/2024     2:37 PM   PHQ-9 SCORE    PHQ-9 Total Score MyChart 2 (Minimal depression)   PHQ-9 Total Score 2           8/18/2024   Substance Use   Alcohol more than 3/day or more than 7/wk Not Applicable   Do you use any other substances recreationally? (!) CANNABIS PRODUCTS        Social History     Tobacco Use    Smoking status: Never     Passive exposure: Never    Smokeless tobacco: Never   Vaping Use    Vaping status: Never Used   Substance Use Topics    Alcohol use: Yes     Comment: 2 drink/week    Drug use: No           7/11/2024   LAST FHS-7 RESULTS   1st degree relative breast or ovarian cancer No   Any relative bilateral breast cancer No   Any male have breast cancer No   Any ONE woman have BOTH breast AND ovarian cancer No   Any woman with breast cancer before 50yrs Yes   2 or more relatives with breast AND/OR bowel cancer No           Mammogram Screening - Annual screen due to genetic mutation or genetic mutation in 1st degree family member        8/18/2024   STI Screening   New sexual partner(s) since last STI/HIV test? No        History of abnormal Pap smear: No - age 30- 64 PAP with HPV every 5 years recommended        6/8/2020    11:11 AM   PAP / HPV   PAP-ABSTRACT See Scanned Document           This result is from an external source.     ASCVD Risk   The 10-year ASCVD risk score (Walt DK, et al., 2019) is: 0.5%    Values used to calculate the score:      Age: 48 years      Sex: Female      Is Non- : No      Diabetic: No      Tobacco smoker: No      Systolic Blood Pressure: 118 mmHg      Is BP treated: No      HDL Cholesterol: 75 mg/dL      Total Cholesterol: 177 mg/dL       Reviewed and updated as needed this visit by Provider                    Past Medical History:   Diagnosis Date    Allergic rhinitis     At high risk for breast cancer 01/12/2016    BRCA2+    At risk for cancer 01/12/2016    at risk for ovarian/fallopian tube cancer, BRCA2+    BRCA2 positive 01/12/2016    [id number]; single  site analysis through [employer]    Depression     Depressive disorder     seasonal depression    Mastitis in female      Past Surgical History:   Procedure Laterality Date    CARPAL TUNNEL RELEASE RT/LT Bilateral     2018    CYSTOSCOPY, SLING TRANSVAGINAL N/A 9/15/2020    Procedure: Midurethral sling and cystoscopy;  Surgeon: Pamela Chilel MD;  Location: [facility] OR    GENITOURINARY SURGERY  9/15/2020    bladder mesh    LAPAROSCOPIC SALPINGO-OOPHORECTOMY Bilateral 9/15/2020    Procedure: laparoscopic bilateral salpingo-oophorectomy, pelvic washings;  Surgeon: Celeste Murillo MD;  Location: [facility] OR    SOFT TISSUE SURGERY  2018    bilateral carpal tunnel release    wisdom teeth       OB History    Para Term  AB Living   2 2 0 0 0 0   SAB IAB Ectopic Multiple Live Births   0 0 0 0 0      # Outcome Date GA Lbr Trever/2nd Weight Sex Type Anes PTL Lv   2 Para            1 Para               Obstetric Comments   2      Lab work is in process  Labs reviewed in EPIC  BP Readings from Last 3 Encounters:   24 118/78   24 130/84   24 127/87    Wt Readings from Last 3 Encounters:   24 67.1 kg (148 lb)   24 67.6 kg (149 lb 1.6 oz)   24 66.7 kg (147 lb)                  Patient Active Problem List   Diagnosis    Family history of malignant neoplasm of breast    Allergic rhinitis    Seasonal affective disorder (H24)    Family history of ovarian cancer    Family history of pancreatic cancer    At high risk for breast cancer    At risk for cancer    Current mild episode of major depressive disorder without prior episode (H24)    CTS (carpal tunnel syndrome)    BRCA2 gene mutation positive in female    Acne vulgaris    S/P bilateral oophorectomy    Urge incontinence of urine    Depressive disorder    Vaginal atrophy    Situational anxiety    Insomnia, unspecified type    Recurrent UTI     Past Surgical History:   Procedure Laterality Date    CARPAL TUNNEL RELEASE  RT/LT Bilateral     2018    CYSTOSCOPY, SLING TRANSVAGINAL N/A 9/15/2020    Procedure: Midurethral sling and cystoscopy;  Surgeon: Pamela Chilel MD;  Location: [facility] OR    GENITOURINARY SURGERY  9/15/2020    bladder mesh    LAPAROSCOPIC SALPINGO-OOPHORECTOMY Bilateral 9/15/2020    Procedure: laparoscopic bilateral salpingo-oophorectomy, pelvic washings;  Surgeon: Celeste Murillo MD;  Location: [facility] OR    SOFT TISSUE SURGERY  12/2018    bilateral carpal tunnel release    wisdom teeth         Social History     Tobacco Use    Smoking status: Never     Passive exposure: Never    Smokeless tobacco: Never   Substance Use Topics    Alcohol use: Yes     Comment: 2 drink/week     Family History   Problem Relation Age of Onset    Hypertension Mother     Depression Mother     Anxiety Disorder Mother     Hereditary Breast and Ovarian Cancer Syndrome Mother         BRCA2+    Hypertension Father     Hearing Loss Father     Valvular heart disease Father         s/p mitral valve replacement    Cerebrovascular Disease Father         TIAs    No Known Problems Sister         BRCA2 negative    No Known Problems Brother     Ovarian Cancer Maternal Grandmother 59    Pancreatic Cancer Maternal Grandfather 87    Substance Abuse Maternal Grandfather         alcohol    Skin Cancer Paternal Grandmother 88    Bladder Cancer Paternal Grandfather 60    Breast Cancer Maternal Aunt 51        BRCA2+         Current Outpatient Medications   Medication Sig Dispense Refill    buPROPion (WELLBUTRIN SR) 200 MG 12 hr tablet Take 1 tablet (200 mg) by mouth 2 times daily 180 tablet 3    Cholecalciferol (VITAMIN D) 2000 UNITS tablet Take 2,000 Units by mouth every morning       estradiol (ESTRACE) 0.1 MG/GM vaginal cream Place 0.5 grams vaginally every day for 14 days then 2-3 times weekly 42.5 g 3    estradiol-norethindrone (COMBIPATCH) 0.05-0.14 MG/DAY bi-weekly patch Place 1 patch onto the skin twice a week 24 patch 3    fluticasone (FLONASE) 50  "MCG/ACT nasal spray Spray 1 spray into both nostrils as needed       ibuprofen (ADVIL/MOTRIN) 600 MG tablet Take 1 tablet (600 mg) by mouth every 6 hours as needed for moderate pain      lactobacillus rhamnosus, GG, (CULTURELL) capsule Take 1 capsule by mouth 2 times daily.      Multiple Vitamin (M.V.I. ADULT IV) Take 1 tablet by mouth every morning       propranolol (INDERAL) 10 MG tablet TAKE 1 TABLET BY MOUTH TWICE DAILY AS NEEDED FOR PRESENTATIONS 60 tablet 1    tolterodine (DETROL) 1 MG tablet Take 1 tablet by mouth twice daily. 180 tablet 0    tretinoin (RETIN-A) 0.05 % external cream Apply topically nightly as needed (for acne). 45 g 3     Allergies   Allergen Reactions    Dust Mites Other (See Comments)         Review of Systems  Constitutional, HEENT, cardiovascular, pulmonary, gi and gu systems are negative, except as otherwise noted.     Objective    Exam  /78 (BP Location: Right arm, Cuff Size: Adult Regular)   Pulse 70   Resp 16   Ht 1.575 m (5' 2\")   Wt 67.1 kg (148 lb)   LMP 08/24/2020 (Approximate)   SpO2 97%   BMI 27.07 kg/m     Estimated body mass index is 27.07 kg/m  as calculated from the following:    Height as of this encounter: 1.575 m (5' 2\").    Weight as of this encounter: 67.1 kg (148 lb).    Physical Exam  GENERAL: alert and no distress  EYES: Eyes grossly normal to inspection, PERRL and conjunctivae and sclerae normal  HENT: ear canals and TM's normal, nose and mouth without ulcers or lesions  NECK: no adenopathy, no asymmetry, masses, or scars  RESP: lungs clear to auscultation - no rales, rhonchi or wheezes  CV: regular rate and rhythm, normal S1 S2, no S3 or S4, no murmur, click or rub, no peripheral edema  ABDOMEN: soft, nontender, no hepatosplenomegaly, no masses and bowel sounds normal  BREAST: Declined   MS: no gross musculoskeletal defects noted, no edema  SKIN: no suspicious lesions or rashes  NEURO: Normal strength and tone, mentation intact and speech " normal  PSYCH: mentation appears normal, affect normal/bright        Signed Electronically by: Stacie Be,

## 2024-08-26 LAB
ANA PAT SER IF-IMP: ABNORMAL
ANA SER QL IF: ABNORMAL
ANA TITR SER IF: ABNORMAL {TITER}

## 2024-08-27 ENCOUNTER — TELEPHONE (OUTPATIENT)
Dept: FAMILY MEDICINE | Facility: CLINIC | Age: 49
End: 2024-08-27
Payer: COMMERCIAL

## 2024-08-27 ENCOUNTER — MYC MEDICAL ADVICE (OUTPATIENT)
Dept: FAMILY MEDICINE | Facility: CLINIC | Age: 49
End: 2024-08-27
Payer: COMMERCIAL

## 2024-08-27 DIAGNOSIS — R79.9 ELEVATED BLOOD UREA NITROGEN: Primary | ICD-10-CM

## 2024-08-27 DIAGNOSIS — R76.8 POSITIVE ANA (ANTINUCLEAR ANTIBODY): Primary | ICD-10-CM

## 2024-08-27 LAB — CCP AB SER IA-ACNC: 1.4 U/ML

## 2024-08-28 ENCOUNTER — VIRTUAL VISIT (OUTPATIENT)
Dept: UROLOGY | Facility: CLINIC | Age: 49
End: 2024-08-28
Payer: COMMERCIAL

## 2024-08-28 ENCOUNTER — TELEPHONE (OUTPATIENT)
Dept: UROLOGY | Facility: CLINIC | Age: 49
End: 2024-08-28

## 2024-08-28 VITALS — BODY MASS INDEX: 27.05 KG/M2 | HEIGHT: 62 IN | WEIGHT: 147 LBS

## 2024-08-28 DIAGNOSIS — N39.41 URGE INCONTINENCE: ICD-10-CM

## 2024-08-28 DIAGNOSIS — R39.15 URGENCY OF URINATION: Primary | ICD-10-CM

## 2024-08-28 PROCEDURE — 99203 OFFICE O/P NEW LOW 30 MIN: CPT | Mod: 95 | Performed by: UROLOGY

## 2024-08-28 RX ORDER — MIRABEGRON 25 MG/1
25 TABLET, FILM COATED, EXTENDED RELEASE ORAL DAILY
Qty: 30 TABLET | Refills: 11 | Status: SHIPPED | OUTPATIENT
Start: 2024-08-28 | End: 2024-09-19

## 2024-08-28 ASSESSMENT — PAIN SCALES - GENERAL: PAINLEVEL: NO PAIN (0)

## 2024-08-28 NOTE — PROGRESS NOTES
"HPI:  Debbie Baker is a 48 year old female with recurrent uti's.  She had a cystoscopy which was normal.  She has been doing well from that standpoint now, however more recently she has been having urgency and even urge incontinence.  She has been using estrogen cream since 2023.  Of note she had a sling by me on 9/15/20.  She     Reviewed previous notes from Dr. Sharif from 6/14/24    Exam:  Ht 1.575 m (5' 2\")   Wt 66.7 kg (147 lb)   LMP 08/24/2020 (Approximate)   BMI 26.89 kg/m    GENERAL: alert and no distress  EYES: Eyes grossly normal to inspection.  No discharge or erythema, or obvious scleral/conjunctival abnormalities.  RESP: No audible wheeze, cough, or visible cyanosis.    SKIN: Visible skin clear. No significant rash, abnormal pigmentation or lesions.  NEURO: Cranial nerves grossly intact.  Mentation and speech appropriate for age.  PSYCH: Appropriate affect, tone, and pace of words    Review of Imaging:  The following imaging exams were reviewed by me:  5/17/23 CT abd/pelvis  IMPRESSION: No acute intra-abdominal pathology.       Review of Labs:  The following labs were reviewed by me and discussed with the patient:  Lab Results   Component Value Date    WBC 7.2 05/09/2023    WBC 6.5 09/08/2020     Lab Results   Component Value Date    RBC 4.91 05/09/2023    RBC 4.71 09/08/2020     Lab Results   Component Value Date    HGB 15.2 05/09/2023    HGB 14.9 09/08/2020     Lab Results   Component Value Date    HCT 43.4 05/09/2023    HCT 44.0 09/08/2020     Lab Results   Component Value Date    MCV 88 05/09/2023    MCV 93 09/08/2020     Lab Results   Component Value Date    MCH 31.0 05/09/2023    MCH 31.6 09/08/2020     Lab Results   Component Value Date    MCHC 35.0 05/09/2023    MCHC 33.9 09/08/2020     Lab Results   Component Value Date    RDW 12.0 05/09/2023    RDW 12.1 09/08/2020     Lab Results   Component Value Date     05/09/2023     09/08/2020        Last Comprehensive Metabolic " Panel:  Sodium   Date Value Ref Range Status   08/23/2024 137 135 - 145 mmol/L Final   09/08/2020 138 133 - 144 mmol/L Final     Potassium   Date Value Ref Range Status   08/23/2024 5.2 3.4 - 5.3 mmol/L Final   09/08/2020 4.2 3.4 - 5.3 mmol/L Final     Chloride   Date Value Ref Range Status   08/23/2024 106 98 - 107 mmol/L Final   09/08/2020 107 94 - 109 mmol/L Final     Carbon Dioxide   Date Value Ref Range Status   09/08/2020 26 20 - 32 mmol/L Final     Carbon Dioxide (CO2)   Date Value Ref Range Status   08/23/2024 24 22 - 29 mmol/L Final     Anion Gap   Date Value Ref Range Status   08/23/2024 7 7 - 15 mmol/L Final   09/08/2020 6 3 - 14 mmol/L Final     Glucose   Date Value Ref Range Status   08/23/2024 93 70 - 99 mg/dL Final   09/08/2020 89 70 - 99 mg/dL Final     Urea Nitrogen   Date Value Ref Range Status   08/23/2024 29.7 (H) 6.0 - 20.0 mg/dL Final   09/08/2020 12 7 - 30 mg/dL Final     Creatinine   Date Value Ref Range Status   08/23/2024 0.87 0.51 - 0.95 mg/dL Final   09/08/2020 0.85 0.52 - 1.04 mg/dL Final     GFR Estimate   Date Value Ref Range Status   08/23/2024 81 >60 mL/min/1.73m2 Final     Comment:     eGFR calculated using 2021 CKD-EPI equation.   09/08/2020 82 >60 mL/min/[1.73_m2] Final     Comment:     Non  GFR Calc  Starting 12/18/2018, serum creatinine based estimated GFR (eGFR) will be   calculated using the Chronic Kidney Disease Epidemiology Collaboration   (CKD-EPI) equation.       Calcium   Date Value Ref Range Status   08/23/2024 9.4 8.8 - 10.4 mg/dL Final     Comment:     Reference intervals for this test were updated on 7/16/2024 to reflect our healthy population more accurately. There may be differences in the flagging of prior results with similar values performed with this method. Those prior results can be interpreted in the context of the updated reference intervals.   09/08/2020 9.4 8.5 - 10.1 mg/dL Final     Bilirubin Total   Date Value Ref Range Status    08/23/2024 0.3 <=1.2 mg/dL Final     Alkaline Phosphatase   Date Value Ref Range Status   08/23/2024 51 40 - 150 U/L Final     ALT   Date Value Ref Range Status   08/23/2024 16 0 - 50 U/L Final     AST   Date Value Ref Range Status   08/23/2024 24 0 - 45 U/L Final                Color Urine (no units)   Date Value   08/23/2024 Yellow   01/05/2021 Yellow     Appearance Urine (no units)   Date Value   08/23/2024 Clear   01/05/2021 Clear     Glucose Urine (mg/dL)   Date Value   08/23/2024 Negative   01/05/2021 Negative     Bilirubin Urine (no units)   Date Value   08/23/2024 Negative   01/05/2021 Negative     Ketones Urine (mg/dL)   Date Value   08/23/2024 Negative   01/05/2021 Negative     Specific Gravity Urine (no units)   Date Value   08/23/2024 1.020   01/05/2021 1.020     pH Urine   Date Value   08/23/2024 6.5   01/05/2021 7.0 pH     Protein Albumin Urine (mg/dL)   Date Value   08/23/2024 Negative   01/05/2021 Negative     Urobilinogen Urine   Date Value   08/23/2024 0.2 E.U./dL   01/05/2021 0.2 EU/dL     Nitrite Urine (no units)   Date Value   08/23/2024 Negative   01/05/2021 Negative     Leukocyte Esterase Urine (no units)   Date Value   08/23/2024 Negative   01/05/2021 Large (A)          Assessment & Plan   47 y/o female with hx of midurethral sling for stress urinary incontinence as well as GSM currently on estrogen as well as urinary urgency and some urge incontinence.  We discussed options of PT, medication, PTNS, and SNS.  She would like to try the medication and PT  -referral to PT  -myrbetriq 25 mg daily.  -f/u in 2-3 months for symptoms    Pamela Chilel MD  [facility] [place]

## 2024-08-28 NOTE — PROGRESS NOTES
Virtual Visit Details    Type of service:  Video Visit     Originating Location (pt. Location): Home    Distant Location (provider location):  Off-site  Platform used for Video Visit: Dominic

## 2024-08-28 NOTE — NURSING NOTE
Current patient location: Patient declined to provide     Is the patient currently in the state of [place]? YES    Visit mode:VIDEO    If the visit is dropped, the patient can be reconnected by: VIDEO VISIT: Text to cell phone:   Telephone Information:   Mobile [phone]       Will anyone else be joining the visit? NO  (If patient encounters technical issues they should call [phone])    How would you like to obtain your AVS? MyChart    Are changes needed to the allergy or medication list? No    Are refills needed on medications prescribed by this physician? NO    Rooming Documentation:  Questionnaire(s) completed      Reason for visit: RECHECK    Rosemarie DIALF

## 2024-08-28 NOTE — LETTER
"8/28/2024       RE: Debbie Baker  [address]  [facility] [address]     Dear Colleague,    Thank you for referring your patient, Debbie Baker, to the [facility] [place] at [facility]. Please see a copy of my visit note below.    Virtual Visit Details    Type of service:  Video Visit     Originating Location (pt. Location): Home    Distant Location (provider location):  Off-site  Platform used for Video Visit: [place]    HPI:  Debbie Baker is a 48 year old female with recurrent uti's.  She had a cystoscopy which was normal.  She has been doing well from that standpoint now, however more recently she has been having urgency and even urge incontinence.  She has been using estrogen cream since 2023.  Of note she had a sling by me on 9/15/20.  She     Reviewed previous notes from Dr. Sharif from 6/14/24    Exam:  Ht 1.575 m (5' 2\")   Wt 66.7 kg (147 lb)   LMP 08/24/2020 (Approximate)   BMI 26.89 kg/m    GENERAL: alert and no distress  EYES: Eyes grossly normal to inspection.  No discharge or erythema, or obvious scleral/conjunctival abnormalities.  RESP: No audible wheeze, cough, or visible cyanosis.    SKIN: Visible skin clear. No significant rash, abnormal pigmentation or lesions.  NEURO: Cranial nerves grossly intact.  Mentation and speech appropriate for age.  PSYCH: Appropriate affect, tone, and pace of words    Review of Imaging:  The following imaging exams were reviewed by me:  5/17/23 CT abd/pelvis  IMPRESSION: No acute intra-abdominal pathology.       Review of Labs:  The following labs were reviewed by me and discussed with the patient:  Lab Results   Component Value Date    WBC 7.2 05/09/2023    WBC 6.5 09/08/2020     Lab Results   Component Value Date    RBC 4.91 05/09/2023    RBC 4.71 09/08/2020     Lab Results   Component Value Date    HGB 15.2 05/09/2023    HGB 14.9 09/08/2020     Lab Results   Component Value Date    HCT 43.4 " 05/09/2023    HCT 44.0 09/08/2020     Lab Results   Component Value Date    MCV 88 05/09/2023    MCV 93 09/08/2020     Lab Results   Component Value Date    MCH 31.0 05/09/2023    MCH 31.6 09/08/2020     Lab Results   Component Value Date    MCHC 35.0 05/09/2023    MCHC 33.9 09/08/2020     Lab Results   Component Value Date    RDW 12.0 05/09/2023    RDW 12.1 09/08/2020     Lab Results   Component Value Date     05/09/2023     09/08/2020        Last Comprehensive Metabolic Panel:  Sodium   Date Value Ref Range Status   08/23/2024 137 135 - 145 mmol/L Final   09/08/2020 138 133 - 144 mmol/L Final     Potassium   Date Value Ref Range Status   08/23/2024 5.2 3.4 - 5.3 mmol/L Final   09/08/2020 4.2 3.4 - 5.3 mmol/L Final     Chloride   Date Value Ref Range Status   08/23/2024 106 98 - 107 mmol/L Final   09/08/2020 107 94 - 109 mmol/L Final     Carbon Dioxide   Date Value Ref Range Status   09/08/2020 26 20 - 32 mmol/L Final     Carbon Dioxide (CO2)   Date Value Ref Range Status   08/23/2024 24 22 - 29 mmol/L Final     Anion Gap   Date Value Ref Range Status   08/23/2024 7 7 - 15 mmol/L Final   09/08/2020 6 3 - 14 mmol/L Final     Glucose   Date Value Ref Range Status   08/23/2024 93 70 - 99 mg/dL Final   09/08/2020 89 70 - 99 mg/dL Final     Urea Nitrogen   Date Value Ref Range Status   08/23/2024 29.7 (H) 6.0 - 20.0 mg/dL Final   09/08/2020 12 7 - 30 mg/dL Final     Creatinine   Date Value Ref Range Status   08/23/2024 0.87 0.51 - 0.95 mg/dL Final   09/08/2020 0.85 0.52 - 1.04 mg/dL Final     GFR Estimate   Date Value Ref Range Status   08/23/2024 81 >60 mL/min/1.73m2 Final     Comment:     eGFR calculated using 2021 CKD-EPI equation.   09/08/2020 82 >60 mL/min/[1.73_m2] Final     Comment:     Non  GFR Calc  Starting 12/18/2018, serum creatinine based estimated GFR (eGFR) will be   calculated using the Chronic Kidney Disease Epidemiology Collaboration   (CKD-EPI) equation.       Calcium    Date Value Ref Range Status   08/23/2024 9.4 8.8 - 10.4 mg/dL Final     Comment:     Reference intervals for this test were updated on 7/16/2024 to reflect our healthy population more accurately. There may be differences in the flagging of prior results with similar values performed with this method. Those prior results can be interpreted in the context of the updated reference intervals.   09/08/2020 9.4 8.5 - 10.1 mg/dL Final     Bilirubin Total   Date Value Ref Range Status   08/23/2024 0.3 <=1.2 mg/dL Final     Alkaline Phosphatase   Date Value Ref Range Status   08/23/2024 51 40 - 150 U/L Final     ALT   Date Value Ref Range Status   08/23/2024 16 0 - 50 U/L Final     AST   Date Value Ref Range Status   08/23/2024 24 0 - 45 U/L Final                Color Urine (no units)   Date Value   08/23/2024 Yellow   01/05/2021 Yellow     Appearance Urine (no units)   Date Value   08/23/2024 Clear   01/05/2021 Clear     Glucose Urine (mg/dL)   Date Value   08/23/2024 Negative   01/05/2021 Negative     Bilirubin Urine (no units)   Date Value   08/23/2024 Negative   01/05/2021 Negative     Ketones Urine (mg/dL)   Date Value   08/23/2024 Negative   01/05/2021 Negative     Specific Gravity Urine (no units)   Date Value   08/23/2024 1.020   01/05/2021 1.020     pH Urine   Date Value   08/23/2024 6.5   01/05/2021 7.0 pH     Protein Albumin Urine (mg/dL)   Date Value   08/23/2024 Negative   01/05/2021 Negative     Urobilinogen Urine   Date Value   08/23/2024 0.2 E.U./dL   01/05/2021 0.2 EU/dL     Nitrite Urine (no units)   Date Value   08/23/2024 Negative   01/05/2021 Negative     Leukocyte Esterase Urine (no units)   Date Value   08/23/2024 Negative   01/05/2021 Large (A)          Assessment & Plan  49 y/o female with hx of midurethral sling for stress urinary incontinence as well as GSM currently on estrogen as well as urinary urgency and some urge incontinence.  We discussed options of PT, medication, PTNS, and SNS.  She  would like to try the medication and PT  -referral to PT  -myrbetriq 25 mg daily.  -f/u in 2-3 months for symptoms    Pamela Chilel MD  [facility]OLOG[facility] [place]      Again, thank you for allowing me to participate in the care of your patient.      Sincerely,    Pamela Chilel MD

## 2024-08-28 NOTE — TELEPHONE ENCOUNTER
Patient confirmed scheduled appointment:  Date: Nov 18th   Time: 1pm  Visit type: Return   Provider: Dr. Chilel   Location: [facility]  Additional notes: -f/u in 2-3 months for symptoms - per check out

## 2024-09-19 RX ORDER — MIRABEGRON 25 MG/1
25 TABLET, FILM COATED, EXTENDED RELEASE ORAL DAILY
Qty: 90 TABLET | Refills: 3 | Status: SHIPPED | OUTPATIENT
Start: 2024-09-19

## 2024-09-30 ENCOUNTER — THERAPY VISIT (OUTPATIENT)
Dept: PHYSICAL THERAPY | Facility: CLINIC | Age: 49
End: 2024-09-30
Payer: COMMERCIAL

## 2024-09-30 DIAGNOSIS — R39.15 URGENCY OF URINATION: ICD-10-CM

## 2024-09-30 DIAGNOSIS — N39.41 URGE INCONTINENCE: ICD-10-CM

## 2024-09-30 PROCEDURE — 97110 THERAPEUTIC EXERCISES: CPT | Mod: GP | Performed by: PHYSICAL THERAPIST

## 2024-09-30 PROCEDURE — 97161 PT EVAL LOW COMPLEX 20 MIN: CPT | Mod: GP | Performed by: PHYSICAL THERAPIST

## 2024-09-30 PROCEDURE — 97530 THERAPEUTIC ACTIVITIES: CPT | Mod: GP | Performed by: PHYSICAL THERAPIST

## 2024-09-30 NOTE — PROGRESS NOTES
PHYSICAL THERAPY EVALUATION  Type of Visit: Evaluation  Debbie presents today with frequent UTIs, where she had them 1x a month for the first part of the year. She was tested every time for a bacterial infection and it was positive. During this time she has gained more weight. She had an oophorectomy 4 years ago as well as a sling placed.  Prior to her surgery she was having stress incontinence that is no longer present. The closer she gets to the toilet the stronger her urge to urinate gets. She will have these sudden strong urges to urinate 5x a day.               Subjective       Presenting condition or subjective complaint: urgency and urge incontinence  Date of onset: 08/28/24    Relevant medical history: Depression; Incontinence; Menopause; Overweight; Sleep disorder like apnea   Dates & types of surgery: Oopherectomy (Sept 2020)    Prior diagnostic imaging/testing results:       Prior therapy history for the same diagnosis, illness or injury: No      Living Environment  Social support: With a significant other or spouse   Type of home: House   Stairs to enter the home: Yes 3 Is there a railing: Yes     Ramp: No   Stairs inside the home: Yes 16 Is there a railing: Yes     Help at home: None  Equipment owned:       Employment: Yes   Hobbies/Interests: Watching soccer and hockey games, walking my dogs, spending time with friends and family, photography, volunteering    Patient goals for therapy: I would like to avoid the urgent need to urinate and leaking.       Objective      PELVIC EVALUATION  ADDITIONAL HISTORY:  Sex assigned at birth: Female  Gender identity: Female    Pronouns: She/Her Hers      Bladder History:  Feels bladder filling: Yes  Triggers for feeling of inability to wait to go to the bathroom: Yes The closer I get to the toilet, the higher the urgency.  How long can you wait to urinate: 10 min  Gets up at night to urinate: Yes 2-3- she is waking up at night with the  "urge  Can stop the flow of urine when urinating: Yes  Volume of urine usually released: Medium   Other issues:    Number of bladder infections in last 12 months:    Fluid intake per day: 64 oz 12 oz I don't drink alcohol daily.  Medications taken for bladder: Yes Mirabegron 25 mg   Activities causing urine leak: Hurrying to the bathroom due to a strong urge to urinate (pee)    Amount of urine typically leaked: depends  Pads used to help with leaking: Yes I use this many pads per day: 2-3   I use this type/brand: Poise Ultra Thin Postpartum Incontinence Pads for Women - Moderate Absorbency  Frequency of voids during the day: every 1-2 hours  Anytime she is getting close to a toilet it can increase her urge to void.   She denies any vaginal pressure/heaviness    Bowel History:  Frequency of bowel movement: 1x/day  Consistency of stool: Soft   bristol stool scale: type 4, a little skinny  Ignores the urge to defecate: No  Other bowel issues:    Length of time spent trying to have a bowel movement:      Sexual Function History:  Sexual orientation: Straight    Sexually active: Yes  Lubrication used: Yes Yes  Pelvic pain: Initial penetration (rectal or vaginal); Deep penetration (rectal or vaginal)  - she has to focus on relaxing, she does feel that her estradiol helps with her skin and frequent UTIs  Pain or difficulty with orgasms/erection/ejaculation: Yes I cannot orgasm with my partner.  State of menopause: During menopause (I am in menopause now)  Hormone medications: Yes Estradiol & Combipatch  She feels as though her right hip is sometimes \"off\". Her chiropractor does help. She was seeing a  to strengthen her right glute. She notes sitting for too long will irritate her right hip.     Are you currently pregnant: No  Number of previous pregnancies: 2  Number of deliveries: 2  If you have delivered before, did you have any of these issues during delivery: Tearing; Vaginal delivery  Do you get regular " exercise: Yes  I do this type of exercise: yoga, walking, cycling  Have you tried pelvic floor strengthening exercises for 4 weeks: No  Do you have any history of trauma that is relevant to your care that you d like to share: No      Discussed reason for referral regarding pelvic health needs and external/internal pelvic floor muscle examination with patient/guardian.  Opportunity provided to ask questions and verbal consent for assessment and intervention was given.    POSTURE: WFL  HIP SCREEN:  Strength:   Pain: - none + mild ++ moderate +++ severe  Strength Scale: 0-5/5 Left Right   Hip Flexion 5 5   Hip Extension 4 4-   Hip Abduction 4 4-   Hip Internal Rotation 5 5   Hip External Rotation 5 5   Knee Flexion 5 5-      Functional Strength Testing: Double Leg Squat: Improper use of glutes/hips and slight weight shift to L LE on from bottom to top of squat  Single Leg Squat: Anterior knee translation, Knee valgus, Hip internal rotation, and Improper use of glutes/hips  Flexibility: tightness hip flexor on right side  Ely's test: L: -, R: +  Trendelenburg: L: -, R: -    PELVIC EXAM  External Visual Inspection:  At rest: Normal  With voluntary pelvic floor contraction: Perineal elevation  Relaxation of PFM: Yes  With intra-abdominal pressure: not tested    Integumentary:   Introitus: Unremarkable    External Digital Palpation per Perineum:   Ischiocavernosis: Tightness  Bulbo cavernosis: Tightness  Transverse perineal: Unremarkable  Levator ani: Unremarkable  Tightness of ischiocavernosus and bulbo cavernosis L>R    Internal Digital Palpation:  Per Vagina:  Myofascial Resistance to Palpation: Firm, Soft  Digital Muscle Performance: P (Power): 4/5  E (Endurance): 10 seconds, difficulty relaxing afterwards  F (Fast Twitch): 10/10  Compensations: Adductors, Gluts  Relaxation Post-Contraction: Normal, Partial/delayed relaxation    Assessment & Plan   CLINICAL IMPRESSIONS  Medical Diagnosis: urge incontinence    Treatment  Diagnosis: pelvic floor dysfunction   Impression/Assessment: Patient is a 49 year old female with urge urinary incontinence complaints.  The following significant findings have been identified: Decreased ROM/flexibility, Decreased strength, Impaired muscle performance, and Decreased activity tolerance. These impairments interfere with their ability to perform self care tasks, work tasks, recreational activities, household chores, driving , household mobility, and community mobility as compared to previous level of function.     Clinical Decision Making (Complexity):  Clinical Presentation: Stable/Uncomplicated  Clinical Presentation Rationale: based on medical and personal factors listed in PT evaluation  Clinical Decision Making (Complexity): Low complexity    PLAN OF CARE  Treatment Interventions:  Modalities: Biofeedback, Cryotherapy, Dry Needling, E-stim, Hot Pack  Interventions: Gait Training, Manual Therapy, Neuromuscular Re-education, Therapeutic Activity, Therapeutic Exercise, Self-Care/Home Management    Long Term Goals     PT Goal 1  Goal Description: Pt will have no more than 1 urge urinary incontinence episode in 1 month.  Rationale: to maximize safety and independence with performance of ADLs and functional tasks  Target Date: 12/23/24      Frequency of Treatment: every 1-2 weeks  Duration of Treatment: 12 weeks    Education Assessment:   Learner/Method: Patient;No Barriers to Learning    Risks and benefits of evaluation/treatment have been explained.   Patient/Family/caregiver agrees with Plan of Care.     Evaluation Time:     PT Eval, Low Complexity Minutes (85148): 35     Signing Clinician: Devika Mahmood PT

## 2024-10-16 ENCOUNTER — TELEPHONE (OUTPATIENT)
Dept: OBGYN | Facility: CLINIC | Age: 49
End: 2024-10-16
Payer: COMMERCIAL

## 2024-10-16 DIAGNOSIS — N95.1 SYMPTOMATIC MENOPAUSAL OR FEMALE CLIMACTERIC STATES: ICD-10-CM

## 2024-10-16 NOTE — TELEPHONE ENCOUNTER
Reason for call:  Medication   If this is a refill request, has the caller requested the refill from the pharmacy already? N/A  Will the patient be using a [facility]? No  Name of the pharmacy and phone number for the current request: [facility] [phone]    Name of the medication requested: estradiol-norethindrone (combipatch) until annual, AO had to reschedule    Other request: N/A    Phone number to reach patient:  Home number on file [phone] (home)    Best Time:  Anytime    Can we leave a detailed message on this number?  YES    Travel screening: Not Applicable

## 2024-10-25 ENCOUNTER — THERAPY VISIT (OUTPATIENT)
Dept: PHYSICAL THERAPY | Facility: CLINIC | Age: 49
End: 2024-10-25
Payer: COMMERCIAL

## 2024-10-25 DIAGNOSIS — N39.41 URGE INCONTINENCE OF URINE: Primary | ICD-10-CM

## 2024-10-25 PROCEDURE — 97110 THERAPEUTIC EXERCISES: CPT | Mod: GP | Performed by: PHYSICAL THERAPIST

## 2024-10-25 PROCEDURE — 97530 THERAPEUTIC ACTIVITIES: CPT | Mod: GP | Performed by: PHYSICAL THERAPIST

## 2024-11-04 ENCOUNTER — VIRTUAL VISIT (OUTPATIENT)
Dept: UROLOGY | Facility: CLINIC | Age: 49
End: 2024-11-04
Payer: COMMERCIAL

## 2024-11-04 DIAGNOSIS — N95.8 GENITOURINARY SYNDROME OF MENOPAUSE: ICD-10-CM

## 2024-11-04 DIAGNOSIS — R39.15 URGENCY OF URINATION: Primary | ICD-10-CM

## 2024-11-04 DIAGNOSIS — R35.0 FREQUENCY OF URINATION: ICD-10-CM

## 2024-11-04 PROCEDURE — 99213 OFFICE O/P EST LOW 20 MIN: CPT | Mod: 95 | Performed by: UROLOGY

## 2024-11-04 NOTE — NURSING NOTE
Current patient location: [address] [place] [id number]    Is the patient currently in the state of [place]? YES    Visit mode:VIDEO    If the visit is dropped, the patient can be reconnected by: VIDEO VISIT: Text to cell phone:   Telephone Information:   Mobile [phone]       Will anyone else be joining the visit? NO  (If patient encounters technical issues they should call [phone]    Are changes needed to the allergy or medication list? No and Pt stated no med changes    Are refills needed on medications prescribed by this physician? NO    Rooming Documentation:  Not applicable    Reason for visit: RECHCHELSY AVILES

## 2024-11-04 NOTE — PROGRESS NOTES
Reason for Visit:  f/u on pelvic floor PT and myrbetriq    Clinical Data:  Debbie Baker is a 49year old female with recurrent uti's.  She had a cystoscopy which was normal.  She has been doing well from that standpoint now, however more recently she has been having urgency and even urge incontinence.  She has been using estrogen cream since 2023.  Of note she had a sling by me on 9/15/20.  She has been taking myrberiq and this helps, as does the pelvice floor PT.  She feels like she has a little more frequency but her urgency is much improved and not having any leakage.    5/17/23 CT abd/pelvis  IMPRESSION: No acute intra-abdominal pathology.       Assessment & Plan   48 y/o female with hx of midurethral sling for stress urinary incontinence as well as GSM currently on estrogen as well as urinary urgency and some urge incontinence significantly improved with PFPT.  She is pleased with the way things are going and would like to come off the medications eventually.  We discussed continuing it for now and if she is doing well with the PT then she can potentially stop the medication to see if that is possible.  -continue estrogen cream  -continue PFPT  -continue myrbetriq 25 mg daily.  -f/u next September for symptoms    Pamela Chilel MD  [facility] [place]

## 2024-11-08 ENCOUNTER — OFFICE VISIT (OUTPATIENT)
Dept: OBGYN | Facility: CLINIC | Age: 49
End: 2024-11-08
Payer: COMMERCIAL

## 2024-11-08 VITALS
HEART RATE: 67 BPM | OXYGEN SATURATION: 100 % | TEMPERATURE: 97.9 F | RESPIRATION RATE: 14 BRPM | SYSTOLIC BLOOD PRESSURE: 121 MMHG | DIASTOLIC BLOOD PRESSURE: 81 MMHG | HEIGHT: 62 IN | WEIGHT: 152.1 LBS | BODY MASS INDEX: 27.99 KG/M2

## 2024-11-08 DIAGNOSIS — G47.00 INSOMNIA, UNSPECIFIED TYPE: ICD-10-CM

## 2024-11-08 DIAGNOSIS — N95.8 GENITOURINARY SYNDROME OF MENOPAUSE: ICD-10-CM

## 2024-11-08 DIAGNOSIS — N95.1 SYMPTOMATIC MENOPAUSAL OR FEMALE CLIMACTERIC STATES: Primary | ICD-10-CM

## 2024-11-08 PROCEDURE — 99214 OFFICE O/P EST MOD 30 MIN: CPT | Performed by: OBSTETRICS & GYNECOLOGY

## 2024-11-08 NOTE — PROGRESS NOTES
Assessment & Plan     Symptomatic menopausal or female climacteric states  Has seen improvement.   Discussed different estrogen/progestin formulations  Sent information about options - considering IUD and ring.    Genitourinary syndrome of menopause  Currently doing well. Continue estrace  Gave Uberlube samples.     Insomnia, unspecified type  Discussed CBT-I and apps.     Plan a change in formulation, either soon or after new insurance at New Year.   Then likely RTC 3 months            No follow-ups on file.    Lidia Lewis is a 49 year old, presenting for the following health issues:  Follow Up    HPI   Presents for follow-up of MHT.   Last seen 6/4/24  Started on Combipatch. Sleep isn't awesome.   Wakes up at 3-4.   Has had substantial improvements in hot flashes and overall wellbeing. Has been much less emotional, but also got daughter moved off to college and that was an emotional times. Still feeling cognitive changes, still somewhat affecting work.   Wondering if she should increase estrogen dose  Wondering if there are longer acting options.   Genitourinary symptoms are well managed.   Libido is low - ok with that right now.     Past Medical History:   Diagnosis Date    Allergic rhinitis     At high risk for breast cancer 01/12/2016    BRCA2+    At risk for cancer 01/12/2016    at risk for ovarian/fallopian tube cancer, BRCA2+    BRCA2 positive 01/12/2016    [id number]; single site analysis through [employer]    Depression     Depressive disorder 2011    seasonal depression    Mastitis in female 2010       Past Surgical History:   Procedure Laterality Date    CARPAL TUNNEL RELEASE RT/LT Bilateral     2018    CYSTOSCOPY, SLING TRANSVAGINAL N/A 9/15/2020    Procedure: Midurethral sling and cystoscopy;  Surgeon: Pamela Chilel MD;  Location: [facility]    GENITOURINARY SURGERY  9/15/2020    bladder mesh    LAPAROSCOPIC SALPINGO-OOPHORECTOMY Bilateral 9/15/2020    Procedure: laparoscopic  bilateral salpingo-oophorectomy, pelvic washings;  Surgeon: Celeste Murillo MD;  Location: [facility] OR    SOFT TISSUE SURGERY  12/2018    bilateral carpal tunnel release    wisdom teeth         Family History   Problem Relation Age of Onset    Hypertension Mother     Depression Mother     Anxiety Disorder Mother     Hereditary Breast and Ovarian Cancer Syndrome Mother         BRCA2+    Hypertension Father     Hearing Loss Father     Valvular heart disease Father         s/p mitral valve replacement    Cerebrovascular Disease Father         TIAs    No Known Problems Sister         BRCA2 negative    No Known Problems Brother     Ovarian Cancer Maternal Grandmother 59    Pancreatic Cancer Maternal Grandfather 87    Substance Abuse Maternal Grandfather         alcohol    Skin Cancer Paternal Grandmother 88    Bladder Cancer Paternal Grandfather 60    Breast Cancer Maternal Aunt 51        BRCA2+       Social History     Socioeconomic History    Marital status:      Spouse name: Tavares    Number of children: 2    Years of education: Not on file    Highest education level: Not on file   Occupational History    Occupation: unemployed   Tobacco Use    Smoking status: Never     Passive exposure: Never    Smokeless tobacco: Never   Vaping Use    Vaping status: Never Used   Substance and Sexual Activity    Alcohol use: Yes     Comment: 2 drink/week    Drug use: No    Sexual activity: Yes     Partners: Male     Birth control/protection: Post-menopausal     Comment: salpingo oophorectomy   Other Topics Concern     Service Not Asked    Blood Transfusions Not Asked    Caffeine Concern No    Occupational Exposure No    Hobby Hazards No    Sleep Concern No    Stress Concern Yes    Weight Concern No    Special Diet No    Back Care No    Exercise Yes     Comment: works out with , does yoga 3-4x/week    Bike Helmet Not Asked    Seat Belt Yes    Self-Exams Yes    Parent/sibling w/ CABG, MI or angioplasty  before 65F 55M? No   Social History Narrative    Not on file     Social Drivers of Health     Financial Resource Strain: Low Risk  (8/18/2024)    Financial Resource Strain     Within the past 12 months, have you or your family members you live with been unable to get utilities (heat, electricity) when it was really needed?: No   Food Insecurity: Low Risk  (8/18/2024)    Food Insecurity     Within the past 12 months, did you worry that your food would run out before you got money to buy more?: No     Within the past 12 months, did the food you bought just not last and you didn t have money to get more?: No   Transportation Needs: Low Risk  (8/18/2024)    Transportation Needs     Within the past 12 months, has lack of transportation kept you from medical appointments, getting your medicines, non-medical meetings or appointments, work, or from getting things that you need?: No   Physical Activity: Insufficiently Active (8/18/2024)    Exercise Vital Sign     Days of Exercise per Week: 3 days     Minutes of Exercise per Session: 40 min   Stress: No Stress Concern Present (8/18/2024)    [employer] [place] [employer] - Occupational Stress Questionnaire     Feeling of Stress : Only a little   Social Connections: Unknown (8/18/2024)    Social Connection and Isolation Panel [NHANES]     Frequency of Communication with Friends and Family: Not on file     Frequency of Social Gatherings with Friends and Family: More than three times a week     Attends Scientology Services: Not on file     Active Member of Clubs or Organizations: Not on file     Attends Club or Organization Meetings: Not on file     Marital Status: Not on file   Interpersonal Safety: Unknown (8/23/2024)    Interpersonal Safety     Do you feel physically and emotionally safe where you currently live?: Patient declined     Within the past 12 months, have you been hit, slapped, kicked or otherwise physically hurt by someone?: Patient declined     Within the  past 12 months, have you been humiliated or emotionally abused in other ways by your partner or ex-partner?: Patient declined   Housing Stability: Low Risk  (8/18/2024)    Housing Stability     Do you have housing? : Yes     Are you worried about losing your housing?: No       Current Outpatient Medications   Medication Sig Dispense Refill    buPROPion (WELLBUTRIN SR) 200 MG 12 hr tablet Take 1 tablet (200 mg) by mouth 2 times daily 180 tablet 3    Cholecalciferol (VITAMIN D) 2000 UNITS tablet Take 2,000 Units by mouth every morning       estradiol (ESTRACE) 0.1 MG/GM vaginal cream Place 0.5 grams vaginally every day for 14 days then 2-3 times weekly 42.5 g 3    estradiol-norethindrone (COMBIPATCH) 0.05-0.14 MG/DAY bi-weekly patch Place 1 patch onto the skin twice a week. 24 patch 3    fluticasone (FLONASE) 50 MCG/ACT nasal spray Spray 1 spray into both nostrils as needed       ibuprofen (ADVIL/MOTRIN) 600 MG tablet Take 1 tablet (600 mg) by mouth every 6 hours as needed for moderate pain      mirabegron (MYRBETRIQ) 25 MG 24 hr tablet Take 1 tablet (25 mg) by mouth daily. 90 tablet 3    Multiple Vitamin (M.V.I. ADULT IV) Take 1 tablet by mouth every morning       propranolol (INDERAL) 10 MG tablet TAKE 1 TABLET BY MOUTH TWICE DAILY AS NEEDED FOR PRESENTATIONS 60 tablet 1    tretinoin (RETIN-A) 0.05 % external cream Apply topically nightly as needed (for acne). 45 g 3    lactobacillus rhamnosus, GG, (CULTURELL) capsule Take 1 capsule by mouth 2 times daily. (Patient not taking: Reported on 11/8/2024)       Current Facility-Administered Medications   Medication Dose Route Frequency Provider Last Rate Last Admin    lidocaine (XYLOCAINE) 2 % external gel   Urethral Once Irina Sharif MD        lidocaine (XYLOCAINE) 2 % external gel   Urethral Once Irina Sharif MD              Allergies   Allergen Reactions    Dust Mites Other (See Comments)           Review of Systems  Constitutional, HEENT, cardiovascular,  "pulmonary, gi and gu systems are negative, except as otherwise noted.      Objective    /81 (BP Location: Left arm, Patient Position: Sitting, Cuff Size: Adult Regular)   Pulse 67   Temp 97.9  F (36.6  C) (Oral)   Resp 14   Ht 1.575 m (5' 2\")   Wt 69 kg (152 lb 1.6 oz)   LMP 08/24/2020 (Approximate)   SpO2 100%   BMI 27.82 kg/m    Body mass index is 27.82 kg/m .  Physical Exam   GENERAL: alert and no distress  MS: no gross musculoskeletal defects noted, no edema  PSYCH: mentation appears normal, affect normal/bright            Signed Electronically by: Any Mckinney MD    "

## 2024-11-11 ENCOUNTER — THERAPY VISIT (OUTPATIENT)
Dept: PHYSICAL THERAPY | Facility: CLINIC | Age: 49
End: 2024-11-11
Attending: UROLOGY
Payer: COMMERCIAL

## 2024-11-11 DIAGNOSIS — N39.41 URGE INCONTINENCE OF URINE: Primary | ICD-10-CM

## 2024-11-11 PROCEDURE — 97530 THERAPEUTIC ACTIVITIES: CPT | Mod: GP | Performed by: PHYSICAL THERAPIST

## 2024-11-11 PROCEDURE — 97110 THERAPEUTIC EXERCISES: CPT | Mod: GP | Performed by: PHYSICAL THERAPIST

## 2024-11-27 ENCOUNTER — THERAPY VISIT (OUTPATIENT)
Dept: PHYSICAL THERAPY | Facility: CLINIC | Age: 49
End: 2024-11-27
Attending: UROLOGY
Payer: COMMERCIAL

## 2024-11-27 DIAGNOSIS — N39.41 URGE INCONTINENCE OF URINE: Primary | ICD-10-CM

## 2024-11-27 PROCEDURE — 97530 THERAPEUTIC ACTIVITIES: CPT | Mod: GP | Performed by: PHYSICAL THERAPIST

## 2024-11-27 PROCEDURE — 97110 THERAPEUTIC EXERCISES: CPT | Mod: GP | Performed by: PHYSICAL THERAPIST

## 2024-12-05 ENCOUNTER — THERAPY VISIT (OUTPATIENT)
Dept: PHYSICAL THERAPY | Facility: CLINIC | Age: 49
End: 2024-12-05
Payer: COMMERCIAL

## 2024-12-05 DIAGNOSIS — N39.41 URGE INCONTINENCE OF URINE: Primary | ICD-10-CM

## 2024-12-23 ENCOUNTER — THERAPY VISIT (OUTPATIENT)
Dept: PHYSICAL THERAPY | Facility: CLINIC | Age: 49
End: 2024-12-23
Payer: COMMERCIAL

## 2024-12-23 DIAGNOSIS — N39.41 URGE INCONTINENCE OF URINE: Primary | ICD-10-CM

## 2024-12-23 PROCEDURE — 97530 THERAPEUTIC ACTIVITIES: CPT | Mod: GP | Performed by: PHYSICAL THERAPIST

## 2024-12-23 PROCEDURE — 97110 THERAPEUTIC EXERCISES: CPT | Mod: GP | Performed by: PHYSICAL THERAPIST

## 2025-01-14 ENCOUNTER — THERAPY VISIT (OUTPATIENT)
Dept: PHYSICAL THERAPY | Facility: CLINIC | Age: 50
End: 2025-01-14
Payer: COMMERCIAL

## 2025-01-14 DIAGNOSIS — N39.41 URGE INCONTINENCE OF URINE: Primary | ICD-10-CM

## 2025-01-14 PROCEDURE — 97110 THERAPEUTIC EXERCISES: CPT | Mod: GP | Performed by: PHYSICAL THERAPIST

## 2025-01-14 PROCEDURE — 97530 THERAPEUTIC ACTIVITIES: CPT | Mod: GP | Performed by: PHYSICAL THERAPIST

## 2025-03-12 ENCOUNTER — TELEPHONE (OUTPATIENT)
Dept: UROLOGY | Facility: CLINIC | Age: 50
End: 2025-03-12
Payer: COMMERCIAL

## 2025-03-14 ENCOUNTER — TELEPHONE (OUTPATIENT)
Dept: FAMILY MEDICINE | Facility: CLINIC | Age: 50
End: 2025-03-14
Payer: COMMERCIAL

## 2025-03-14 DIAGNOSIS — Z71.85 IMMUNIZATION COUNSELING: Primary | ICD-10-CM

## 2025-03-14 NOTE — TELEPHONE ENCOUNTER
Patient called asking if Stacie Page would recommend MMR vaccine and if so, please send order to [facility] pharmacy [address] so that they can administer.     [facility] pharmacy might need an order to administer due to MMR not currently listed as due.  Our records and [facility] do not show a history of MMR vaccine.  Patient stated that she will contact her childhood clinic to see if they have any records but would still like the vaccine order placed if PCP thinks she should get it      Ok to leave detailed VM    Chung Patrick RN  [facility]

## 2025-03-15 ENCOUNTER — MYC MEDICAL ADVICE (OUTPATIENT)
Dept: FAMILY MEDICINE | Facility: CLINIC | Age: 50
End: 2025-03-15
Payer: COMMERCIAL

## 2025-03-16 NOTE — TELEPHONE ENCOUNTER
Typically if born before 1969 then recommend getting an MMR vaccine booster. If unknown previous vaccine hx then ok to get an MMR booster - will send an order to her pharmacy if she is unable to find the vaccine hx    Thank you,     Stacie Be D.O.

## 2025-03-17 ENCOUNTER — LAB (OUTPATIENT)
Dept: LAB | Facility: CLINIC | Age: 50
End: 2025-03-17
Payer: COMMERCIAL

## 2025-03-17 ENCOUNTER — E-VISIT (OUTPATIENT)
Dept: OBGYN | Facility: CLINIC | Age: 50
End: 2025-03-17
Payer: COMMERCIAL

## 2025-03-17 DIAGNOSIS — R35.0 URINARY FREQUENCY: ICD-10-CM

## 2025-03-17 DIAGNOSIS — N30.00 ACUTE CYSTITIS WITHOUT HEMATURIA: ICD-10-CM

## 2025-03-17 DIAGNOSIS — R35.0 URINARY FREQUENCY: Primary | ICD-10-CM

## 2025-03-17 LAB
ALBUMIN UR-MCNC: NEGATIVE MG/DL
APPEARANCE UR: CLEAR
BACTERIA #/AREA URNS HPF: ABNORMAL /HPF
BILIRUB UR QL STRIP: NEGATIVE
COLOR UR AUTO: YELLOW
GLUCOSE UR STRIP-MCNC: NEGATIVE MG/DL
HGB UR QL STRIP: ABNORMAL
KETONES UR STRIP-MCNC: NEGATIVE MG/DL
LEUKOCYTE ESTERASE UR QL STRIP: ABNORMAL
NITRATE UR QL: POSITIVE
PH UR STRIP: 5.5 [PH] (ref 5–7)
RBC #/AREA URNS AUTO: ABNORMAL /HPF
SP GR UR STRIP: 1.02 (ref 1–1.03)
SQUAMOUS #/AREA URNS AUTO: ABNORMAL /LPF
UROBILINOGEN UR STRIP-ACNC: 0.2 E.U./DL
WBC #/AREA URNS AUTO: ABNORMAL /HPF

## 2025-03-17 PROCEDURE — 81001 URINALYSIS AUTO W/SCOPE: CPT

## 2025-03-18 RX ORDER — NITROFURANTOIN 25; 75 MG/1; MG/1
100 CAPSULE ORAL 2 TIMES DAILY
Qty: 10 CAPSULE | Refills: 0 | Status: SHIPPED | OUTPATIENT
Start: 2025-03-18 | End: 2025-03-23

## 2025-03-18 RX ORDER — SULFAMETHOXAZOLE AND TRIMETHOPRIM 800; 160 MG/1; MG/1
1 TABLET ORAL 2 TIMES DAILY
Qty: 6 TABLET | Refills: 0 | Status: SHIPPED | OUTPATIENT
Start: 2025-03-18 | End: 2025-03-18

## 2025-03-19 LAB
BACTERIA UR CULT: ABNORMAL
BACTERIA UR CULT: ABNORMAL

## 2025-03-20 ENCOUNTER — LAB (OUTPATIENT)
Dept: LAB | Facility: CLINIC | Age: 50
End: 2025-03-20
Payer: COMMERCIAL

## 2025-03-20 ENCOUNTER — E-VISIT (OUTPATIENT)
Dept: OBGYN | Facility: CLINIC | Age: 50
End: 2025-03-20
Payer: COMMERCIAL

## 2025-03-20 DIAGNOSIS — J02.9 SORE THROAT: Primary | ICD-10-CM

## 2025-03-20 DIAGNOSIS — R05.1 ACUTE COUGH: ICD-10-CM

## 2025-03-20 DIAGNOSIS — J02.9 SORE THROAT: ICD-10-CM

## 2025-03-20 LAB
DEPRECATED S PYO AG THROAT QL EIA: NEGATIVE
FLUAV AG SPEC QL IA: NEGATIVE
FLUBV AG SPEC QL IA: NEGATIVE
S PYO DNA THROAT QL NAA+PROBE: NOT DETECTED

## 2025-05-10 DIAGNOSIS — F32.A DEPRESSIVE DISORDER: ICD-10-CM

## 2025-05-10 DIAGNOSIS — F33.8 SEASONAL AFFECTIVE DISORDER: ICD-10-CM

## 2025-05-12 RX ORDER — BUPROPION HYDROCHLORIDE 200 MG/1
200 TABLET, EXTENDED RELEASE ORAL 2 TIMES DAILY
Qty: 180 TABLET | Refills: 0 | Status: SHIPPED | OUTPATIENT
Start: 2025-05-12

## 2025-05-22 ENCOUNTER — E-VISIT (OUTPATIENT)
Dept: OBGYN | Facility: CLINIC | Age: 50
End: 2025-05-22
Payer: COMMERCIAL

## 2025-05-22 DIAGNOSIS — R30.0 DYSURIA: Primary | ICD-10-CM

## 2025-05-22 RX ORDER — SULFAMETHOXAZOLE AND TRIMETHOPRIM 800; 160 MG/1; MG/1
1 TABLET ORAL 2 TIMES DAILY
Qty: 6 TABLET | Refills: 0 | Status: SHIPPED | OUTPATIENT
Start: 2025-05-22 | End: 2025-05-25

## 2025-05-23 ENCOUNTER — RESULTS FOLLOW-UP (OUTPATIENT)
Dept: OBGYN | Facility: CLINIC | Age: 50
End: 2025-05-23

## 2025-05-23 ENCOUNTER — LAB (OUTPATIENT)
Dept: LAB | Facility: CLINIC | Age: 50
End: 2025-05-23
Payer: COMMERCIAL

## 2025-05-23 DIAGNOSIS — R30.0 DYSURIA: ICD-10-CM

## 2025-05-23 LAB
ALBUMIN UR-MCNC: NEGATIVE MG/DL
APPEARANCE UR: CLEAR
BACTERIA #/AREA URNS HPF: ABNORMAL /HPF
BILIRUB UR QL STRIP: NEGATIVE
COLOR UR AUTO: YELLOW
GLUCOSE UR STRIP-MCNC: NEGATIVE MG/DL
HGB UR QL STRIP: ABNORMAL
KETONES UR STRIP-MCNC: NEGATIVE MG/DL
LEUKOCYTE ESTERASE UR QL STRIP: NEGATIVE
NITRATE UR QL: NEGATIVE
PH UR STRIP: 7 [PH] (ref 5–7)
RBC #/AREA URNS AUTO: ABNORMAL /HPF
SP GR UR STRIP: 1.01 (ref 1–1.03)
SQUAMOUS #/AREA URNS AUTO: ABNORMAL /LPF
UROBILINOGEN UR STRIP-ACNC: 0.2 E.U./DL
WBC #/AREA URNS AUTO: ABNORMAL /HPF

## 2025-05-23 PROCEDURE — 81001 URINALYSIS AUTO W/SCOPE: CPT

## 2025-06-18 ENCOUNTER — PATIENT OUTREACH (OUTPATIENT)
Dept: FAMILY MEDICINE | Facility: CLINIC | Age: 50
End: 2025-06-18
Payer: COMMERCIAL

## 2025-06-18 NOTE — TELEPHONE ENCOUNTER
Patient Quality Outreach    Patient is due for the following:   Depression  -  Depression follow-up visit    Action(s) Taken:   Schedule a office visit for MH    Type of outreach:    Chart review performed, no outreach needed.  Has appointment coming up    Questions for provider review:    None         Vish Paz, [facility]  Chart routed to None.

## 2025-07-14 ENCOUNTER — ANCILLARY PROCEDURE (OUTPATIENT)
Dept: GENERAL RADIOLOGY | Facility: CLINIC | Age: 50
End: 2025-07-14
Attending: FAMILY MEDICINE
Payer: COMMERCIAL

## 2025-07-14 ENCOUNTER — OFFICE VISIT (OUTPATIENT)
Dept: ORTHOPEDICS | Facility: CLINIC | Age: 50
End: 2025-07-14
Payer: COMMERCIAL

## 2025-07-14 DIAGNOSIS — M25.561 POSTERIOR RIGHT KNEE PAIN: Primary | ICD-10-CM

## 2025-07-14 DIAGNOSIS — M25.561 RIGHT KNEE PAIN, UNSPECIFIED CHRONICITY: ICD-10-CM

## 2025-07-14 DIAGNOSIS — M25.561 RIGHT KNEE PAIN, UNSPECIFIED CHRONICITY: Primary | ICD-10-CM

## 2025-07-14 PROCEDURE — 73562 X-RAY EXAM OF KNEE 3: CPT | Mod: RT | Performed by: RADIOLOGY

## 2025-07-14 PROCEDURE — 99203 OFFICE O/P NEW LOW 30 MIN: CPT | Mod: GC | Performed by: FAMILY MEDICINE

## 2025-07-14 RX ORDER — NAPROXEN 500 MG/1
500 TABLET ORAL 2 TIMES DAILY WITH MEALS
Qty: 28 TABLET | Refills: 2 | Status: SHIPPED | OUTPATIENT
Start: 2025-07-14

## 2025-07-14 NOTE — PROGRESS NOTES
ASSESSMENT/PLAN:    (M25.561) Posterior right knee pain  (primary encounter diagnosis)  Comment: exam w/ mild popliteal swelling and posterior pain - likely baker's cyst from underlying patellofemoral OA vs degenerative meniscal tear; will trial PT and nsaids; f/up in 6-8 weeks; if no better, consider further imaging   Plan: Physical Therapy  Referral, naproxen (NAPROSYN) 500 MG tablet          Attestation:  This patient has been seen and evaluated by me, John Zhou MD with the resident, Dr Zhang and the care team. I agree with the findings and plan of care as documented in this note.    John Zhou MD  July 14, 2025  2:29 PM          Pt is a 49 year old female here today for evaluation of right knee pain:   Patient reports she noticed right knee pain about 1 month ago after a yoga sculpt episode. Pain is intermittent, worse when sitting in some position for extended period of time. Increased pain going up stairs. Pain actually improved after riding peloton 2 weeks ago. Patient is very active at baseline. Reports occasional clicking noise. No hx of knee injuries. Pain improved with ibuprofen, ice and elevation. Reports hx of tight IT band on right side. No hx of knee injuries.     HPI:   Right Knee pain : Pain located deep in the knee, posterior   Duration? 1 month   Injury/ Inciting activity? Patient reports her pain started when she was getting back into weightlifting    Pop? No    Swelling? No    Limited motion? No    Locking/ Catching? Clicking - maybe painful   Giving way/ instability? No    Imaging? XR on 7/14/25   Treatment? Icing and elevation    Pain with internal and external rotation, pain with single leg weightbearing     Past Medical History:   Diagnosis Date    Allergic rhinitis     At high risk for breast cancer 01/12/2016    BRCA2+    At risk for cancer 01/12/2016    at risk for ovarian/fallopian tube cancer, BRCA2+    BRCA2 positive 01/12/2016    [id number]; single site analysis  through [employer]    Depression     Depressive disorder 2011    seasonal depression    Mastitis in female 2010      Past Surgical History:   Procedure Laterality Date    CARPAL TUNNEL RELEASE RT/LT Bilateral     2018    CYSTOSCOPY, SLING TRANSVAGINAL N/A 9/15/2020    Procedure: Midurethral sling and cystoscopy;  Surgeon: Pamela Chilel MD;  Location: [facility] OR    GENITOURINARY SURGERY  9/15/2020    bladder mesh    LAPAROSCOPIC SALPINGO-OOPHORECTOMY Bilateral 9/15/2020    Procedure: laparoscopic bilateral salpingo-oophorectomy, pelvic washings;  Surgeon: Celeste Murillo MD;  Location: [facility] OR    SOFT TISSUE SURGERY  12/2018    bilateral carpal tunnel release    wisdom teeth        Current Outpatient Medications   Medication Sig Dispense Refill    buPROPion (WELLBUTRIN SR) 200 MG 12 hr tablet Take 1 tablet by mouth twice daily. 180 tablet 0    Cholecalciferol (VITAMIN D) 2000 UNITS tablet Take 2,000 Units by mouth every morning       estradiol (ESTRACE) 0.1 MG/GM vaginal cream Insert 0.5 grams vaginally daily for 14 days, then 2 to 3 times weekly. 42.5 g 2    estradiol-norethindrone (COMBIPATCH) 0.05-0.14 MG/DAY bi-weekly patch Place 1 patch onto the skin twice a week. 24 patch 3    fluticasone (FLONASE) 50 MCG/ACT nasal spray Spray 1 spray into both nostrils as needed       ibuprofen (ADVIL/MOTRIN) 600 MG tablet Take 1 tablet (600 mg) by mouth every 6 hours as needed for moderate pain      lactobacillus rhamnosus, GG, (CULTURELL) capsule Take 1 capsule by mouth 2 times daily. (Patient not taking: Reported on 11/8/2024)      mirabegron (MYRBETRIQ) 25 MG 24 hr tablet Take 1 tablet (25 mg) by mouth daily. 90 tablet 2    mirabegron (MYRBETRIQ) 25 MG 24 hr tablet Take 1 tablet (25 mg) by mouth daily. 90 tablet 3    Multiple Vitamin (M.V.I. ADULT IV) Take 1 tablet by mouth every morning       propranolol (INDERAL) 10 MG tablet TAKE 1 TABLET BY MOUTH TWICE DAILY AS NEEDED FOR PRESENTATIONS 60 tablet 1    solifenacin  (VESICARE) 10 MG tablet Take 1 tablet (10 mg) by mouth daily. 30 tablet 11    tretinoin (RETIN-A) 0.05 % external cream Apply topically nightly as needed (for acne). 45 g 3      Allergies   Allergen Reactions    Dust Mites Other (See Comments)      ROS:   Gen- no fevers/chills   Rheum - no morning stiffness   Derm - no rash/ redness   Neuro - no numbness, no tingling   Remainder of ROS negative.     Exam:   LMP 08/24/2020 (Approximate)        R Knee:   ROM: 0-130; Crepitus: no   Effusion: no ; Swelling: YES - popliteal fossa  Strength: Full in flexion/ extension   Tenderness: Patella - no Medial joint line - no; Lateral joint line - no; Quad tendon - no; Patellar tendon- no; Hamstring - no.   Cruciates: anterior drawer - neg/posterior drawer -neg. Lachman - neg   Collaterals: varus -neg/valgus -neg.   Patella: patellar compression - neg; single leg bend- reproduces posterior pain   Meniscus: Claudia - neg; Thessaly - neg       Xray of R knee on July 14, 2025 at [facility] location - films personally reviewed with patient at time of visit     My impression: + patellofemoral spurring

## 2025-07-14 NOTE — LETTER
7/14/2025      RE: Debbie Baker  [address]   [place] [address]     Dear Colleague,    Thank you for referring your patient, Debbie Baker, to the [facility] [place]. Please see a copy of my visit note below.    ASSESSMENT/PLAN:    (M25.561) Posterior right knee pain  (primary encounter diagnosis)  Comment: exam w/ mild popliteal swelling and posterior pain - likely baker's cyst from underlying patellofemoral OA vs degenerative meniscal tear; will trial PT and nsaids; f/up in 6-8 weeks; if no better, consider further imaging   Plan: Physical Therapy  Referral, naproxen (NAPROSYN) 500 MG tablet          Attestation:  This patient has been seen and evaluated by me, John Zhou MD with the resident, Dr Zhang and the care team. I agree with the findings and plan of care as documented in this note.    John Zhou MD  July 14, 2025  2:29 PM          Pt is a 49 year old female here today for evaluation of right knee pain:   Patient reports she noticed right knee pain about 1 month ago after a yoga sculpt episode. Pain is intermittent, worse when sitting in some position for extended period of time. Increased pain going up stairs. Pain actually improved after riding peloton 2 weeks ago. Patient is very active at baseline. Reports occasional clicking noise. No hx of knee injuries. Pain improved with ibuprofen, ice and elevation. Reports hx of tight IT band on right side. No hx of knee injuries.     HPI:   Right Knee pain : Pain located deep in the knee, posterior   Duration? 1 month   Injury/ Inciting activity? Patient reports her pain started when she was getting back into weightlifting    Pop? No    Swelling? No    Limited motion? No    Locking/ Catching? Clicking - maybe painful   Giving way/ instability? No    Imaging? XR on 7/14/25   Treatment? Icing and elevation    Pain with internal and external rotation, pain with single leg weightbearing     Past Medical History:    Diagnosis Date     Allergic rhinitis      At high risk for breast cancer 01/12/2016    BRCA2+     At risk for cancer 01/12/2016    at risk for ovarian/fallopian tube cancer, BRCA2+     BRCA2 positive 01/12/2016    [id number]; single site analysis through [employer]     Depression      Depressive disorder 2011    seasonal depression     Mastitis in female 2010      Past Surgical History:   Procedure Laterality Date     CARPAL TUNNEL RELEASE RT/LT Bilateral     2018     CYSTOSCOPY, SLING TRANSVAGINAL N/A 9/15/2020    Procedure: Midurethral sling and cystoscopy;  Surgeon: Pamela Chilel MD;  [facility]: [facility]     GENITOURINARY SURGERY  9/15/2020    bladder mesh     LAPAROSCOPIC SALPINGO-OOPHORECTOMY Bilateral 9/15/2020    Procedure: laparoscopic bilateral salpingo-oophorectomy, pelvic washings;  Surgeon: Celeste Murillo MD;  Location: [facility] OR     SOFT TISSUE SURGERY  12/2018    bilateral carpal tunnel release     wisdom teeth        Current Outpatient Medications   Medication Sig Dispense Refill     buPROPion (WELLBUTRIN SR) 200 MG 12 hr tablet Take 1 tablet by mouth twice daily. 180 tablet 0     Cholecalciferol (VITAMIN D) 2000 UNITS tablet Take 2,000 Units by mouth every morning        estradiol (ESTRACE) 0.1 MG/GM vaginal cream Insert 0.5 grams vaginally daily for 14 days, then 2 to 3 times weekly. 42.5 g 2     estradiol-norethindrone (COMBIPATCH) 0.05-0.14 MG/DAY bi-weekly patch Place 1 patch onto the skin twice a week. 24 patch 3     fluticasone (FLONASE) 50 MCG/ACT nasal spray Spray 1 spray into both nostrils as needed        ibuprofen (ADVIL/MOTRIN) 600 MG tablet Take 1 tablet (600 mg) by mouth every 6 hours as needed for moderate pain       lactobacillus rhamnosus, GG, (CULTURELL) capsule Take 1 capsule by mouth 2 times daily. (Patient not taking: Reported on 11/8/2024)       mirabegron (MYRBETRIQ) 25 MG 24 hr tablet Take 1 tablet (25 mg) by mouth daily. 90 tablet 2     mirabegron (MYRBETRIQ)  25 MG 24 hr tablet Take 1 tablet (25 mg) by mouth daily. 90 tablet 3     Multiple Vitamin (M.V.I. ADULT IV) Take 1 tablet by mouth every morning        propranolol (INDERAL) 10 MG tablet TAKE 1 TABLET BY MOUTH TWICE DAILY AS NEEDED FOR PRESENTATIONS 60 tablet 1     solifenacin (VESICARE) 10 MG tablet Take 1 tablet (10 mg) by mouth daily. 30 tablet 11     tretinoin (RETIN-A) 0.05 % external cream Apply topically nightly as needed (for acne). 45 g 3      Allergies   Allergen Reactions     Dust Mites Other (See Comments)      ROS:   Gen- no fevers/chills   Rheum - no morning stiffness   Derm - no rash/ redness   Neuro - no numbness, no tingling   Remainder of ROS negative.     Exam:   LMP 08/24/2020 (Approximate)        R Knee:   ROM: 0-130; Crepitus: no   Effusion: no ; Swelling: YES - popliteal fossa  Strength: Full in flexion/ extension   Tenderness: Patella - no Medial joint line - no; Lateral joint line - no; Quad tendon - no; Patellar tendon- no; Hamstring - no.   Cruciates: anterior drawer - neg/posterior drawer -neg. Lachman - neg   Collaterals: varus -neg/valgus -neg.   Patella: patellar compression - neg; single leg bend- reproduces posterior pain   Meniscus: Claudia - neg; Thessaly - neg       Xray of R knee on July 14, 2025 at [facility] location - films personally reviewed with patient at time of visit     My impression: + patellofemoral spurring       Again, thank you for allowing me to participate in the care of your patient.      Sincerely,    John Zhou MD

## 2025-07-14 NOTE — PATIENT INSTRUCTIONS
[facility] Outpatient Physical Therapy Locations    To schedule an appointment please call our scheduling department at [phone]    To fax a referral to be scheduled fax to [phone]    [place]: [address], [facility]: [address] [facility]: [address], [facility]: [address], [facility]: [address], [facility]: [address] [facility]: [address] [place], [place]  [place]: [address] [facility] , [address], [facility]: [address] [facility] , [address], [facility]: [address] [facility]: [address], [facility]: [address], [facility]: [address], [facility]: [address], [facility]: [address] [facility]: [address] [facility] [address], [facility]: [facility]: [address] [facility], [address], [facility]: [address], [facility]: [address], [place], [place]  [place]: [address] [facility]: [address] [place] [address]: [address], [facility]: [address] [facility] [place], [place]  [place]: [address], [facility]: [address], [facility]: [address] [facility] [place], [place]  [facility]: [address] [place], [place]  [facility]: [address] [place] [address]  [place]: [address] [facility] [address] [facility], [address], [facility] [address] [facility],  [facility]: [address] [facility] [place], [place]

## 2025-07-18 PROBLEM — M25.561 ACUTE PAIN OF RIGHT KNEE: Status: ACTIVE | Noted: 2025-07-18

## 2025-07-22 ENCOUNTER — TELEPHONE (OUTPATIENT)
Dept: FAMILY MEDICINE | Facility: CLINIC | Age: 50
End: 2025-07-22
Payer: COMMERCIAL

## 2025-07-22 NOTE — TELEPHONE ENCOUNTER
Reason for Call:  Appointment Request    Patient requesting this type of appt:  Preventive     Requested provider: Stacie Be    Reason patient unable to be scheduled: Not within requested timeframe    When does patient want to be seen/preferred time: Near to original appointment of 8/25/25 which needed to be rescheduled due to provider being out.     Comments: as above    Could we send this information to you in "[employer] or would you prefer to receive a phone call?:   Patient would prefer a phone call   Okay to leave a detailed message?: Yes at Cell number on file:    Telephone Information:   Mobile [phone]       Call taken on 7/22/2025 at 3:10 PM by Martell Iqbal

## 2025-08-01 ENCOUNTER — ANCILLARY PROCEDURE (OUTPATIENT)
Dept: MAMMOGRAPHY | Facility: CLINIC | Age: 50
End: 2025-08-01
Payer: COMMERCIAL

## 2025-08-01 DIAGNOSIS — R92.30 DENSE BREAST: ICD-10-CM

## 2025-08-01 DIAGNOSIS — Z15.01 BRCA2 GENE MUTATION POSITIVE IN FEMALE: ICD-10-CM

## 2025-08-01 DIAGNOSIS — Z15.02 BRCA2 GENE MUTATION POSITIVE IN FEMALE: ICD-10-CM

## 2025-08-01 DIAGNOSIS — Z80.3 FAMILY HISTORY OF MALIGNANT NEOPLASM OF BREAST: ICD-10-CM

## 2025-08-01 DIAGNOSIS — Z15.09 BRCA2 GENE MUTATION POSITIVE IN FEMALE: ICD-10-CM

## 2025-08-01 PROCEDURE — 77063 BREAST TOMOSYNTHESIS BI: CPT | Mod: GC | Performed by: STUDENT IN AN ORGANIZED HEALTH CARE EDUCATION/TRAINING PROGRAM

## 2025-08-01 PROCEDURE — 77067 SCR MAMMO BI INCL CAD: CPT | Mod: GC | Performed by: STUDENT IN AN ORGANIZED HEALTH CARE EDUCATION/TRAINING PROGRAM

## 2025-08-06 ENCOUNTER — THERAPY VISIT (OUTPATIENT)
Age: 50
End: 2025-08-06
Attending: FAMILY MEDICINE
Payer: COMMERCIAL

## 2025-08-06 DIAGNOSIS — M25.561 ACUTE PAIN OF RIGHT KNEE: Primary | ICD-10-CM

## 2025-08-06 PROCEDURE — 97530 THERAPEUTIC ACTIVITIES: CPT | Mod: GP | Performed by: PHYSICAL THERAPIST

## 2025-08-06 PROCEDURE — 97140 MANUAL THERAPY 1/> REGIONS: CPT | Mod: GP | Performed by: PHYSICAL THERAPIST

## 2025-08-06 PROCEDURE — 97110 THERAPEUTIC EXERCISES: CPT | Mod: GP | Performed by: PHYSICAL THERAPIST

## 2025-08-16 ENCOUNTER — E-VISIT (OUTPATIENT)
Dept: URGENT CARE | Facility: CLINIC | Age: 50
End: 2025-08-16
Payer: COMMERCIAL

## 2025-08-16 DIAGNOSIS — R30.0 DIFFICULT OR PAINFUL URINATION: Primary | ICD-10-CM

## 2025-08-17 ENCOUNTER — LAB (OUTPATIENT)
Dept: LAB | Facility: CLINIC | Age: 50
End: 2025-08-17
Payer: COMMERCIAL

## 2025-08-17 DIAGNOSIS — R30.0 DIFFICULT OR PAINFUL URINATION: ICD-10-CM

## 2025-08-17 LAB
ALBUMIN UR-MCNC: NEGATIVE MG/DL
APPEARANCE UR: CLEAR
BACTERIA #/AREA URNS HPF: ABNORMAL /HPF
BILIRUB UR QL STRIP: NEGATIVE
COLOR UR AUTO: YELLOW
GLUCOSE UR STRIP-MCNC: NEGATIVE MG/DL
HGB UR QL STRIP: NEGATIVE
KETONES UR STRIP-MCNC: NEGATIVE MG/DL
LEUKOCYTE ESTERASE UR QL STRIP: ABNORMAL
NITRATE UR QL: NEGATIVE
PH UR STRIP: 7.5 [PH] (ref 5–7)
RBC #/AREA URNS AUTO: ABNORMAL /HPF
SP GR UR STRIP: 1.01 (ref 1–1.03)
SQUAMOUS #/AREA URNS AUTO: ABNORMAL /LPF
UROBILINOGEN UR STRIP-ACNC: 0.2 E.U./DL
WBC #/AREA URNS AUTO: ABNORMAL /HPF

## 2025-08-17 PROCEDURE — 81001 URINALYSIS AUTO W/SCOPE: CPT

## 2025-08-17 PROCEDURE — 87186 SC STD MICRODIL/AGAR DIL: CPT

## 2025-08-17 PROCEDURE — 87086 URINE CULTURE/COLONY COUNT: CPT

## 2025-08-18 ENCOUNTER — PATIENT OUTREACH (OUTPATIENT)
Dept: CARE COORDINATION | Facility: CLINIC | Age: 50
End: 2025-08-18

## 2025-08-18 LAB — BACTERIA UR CULT: ABNORMAL

## (undated) DEVICE — GLOVE PROTEXIS MICRO 7.0  2D73PM70

## (undated) DEVICE — TUBING IRR TUR Y TYPE 2C4041

## (undated) DEVICE — PROTECTOR ARM ONE-STEP TRENDELENBURG 40418

## (undated) DEVICE — ESU LIGASURE SEALER/DIVIDER RETRACT L-HOOK 37CM LF5637

## (undated) DEVICE — TUBING SUCTION 10'X3/16" N510

## (undated) DEVICE — SUCTION MANIFOLD DORNOCH ULTRA CART UL-CL500

## (undated) DEVICE — PREP CHLORAPREP 26ML TINTED ORANGE  260815

## (undated) DEVICE — SPONGE PACK VAGINAL 2"X9

## (undated) DEVICE — CONNECTOR WATER VALVE PERFUSION PACK STR 020272801

## (undated) DEVICE — BLADE KNIFE SURG 11 371111

## (undated) DEVICE — ESU LIGASURE MARYLAND VESSEL LAP 44CM XLONG LF1944

## (undated) DEVICE — BLADE KNIFE SURG 15 371115

## (undated) DEVICE — GLOVE ESTEEM BLUE W/NEU-THERA 7.5  2D73PB75

## (undated) DEVICE — GLOVE PROTEXIS BLUE W/NEU-THERA 7.0  2D73EB70

## (undated) DEVICE — ENDO TROCAR SLEEVE KII ADV FIXATION 05X100MM CFS02

## (undated) DEVICE — SUCTION IRR STRYKERFLOW II W/TIP 250-070-520

## (undated) DEVICE — LINEN TOWEL PACK X30 5481

## (undated) DEVICE — SU VICRYL 2-0 SH 27" UND J417H

## (undated) DEVICE — Device

## (undated) DEVICE — SU VICRYL 4-0 PS-2 18" UND J496H

## (undated) DEVICE — ADH SKIN CLOSURE PREMIERPRO EXOFIN 1.0ML 3470

## (undated) DEVICE — ENDO SCOPE WARMER SEAL  C3101

## (undated) DEVICE — SYR 10ML LL W/O NDL 302995

## (undated) DEVICE — LINEN TOWEL PACK X6 WHITE 5487

## (undated) DEVICE — DRAPE SHEET REV FOLD 3/4 9349

## (undated) DEVICE — GLOVE PROTEXIS W/NEU-THERA 6.5  2D73TE65

## (undated) DEVICE — PAD CHUX UNDERPAD 23X24" 7136

## (undated) DEVICE — LINEN TOWEL PACK X5 5464

## (undated) DEVICE — ENDO TROCAR FIRST ENTRY KII FIOS ADV FIX 05X100MM CFF03

## (undated) DEVICE — SOL WATER IRRIG 1000ML BOTTLE 2F7114

## (undated) DEVICE — SU VICRYL 1 CT-1 27" UND J261H

## (undated) DEVICE — SPECIMEN TRAP MUCOUS 40ML LUKI C30200A

## (undated) DEVICE — RETR ELASTIC STAYS LONE STAR SHARP 5MM 8/PACK 3311-8G

## (undated) DEVICE — NDL INSUFFLATION 13GA 120MM C2201

## (undated) DEVICE — ESU GROUND PAD ADULT W/CORD E7507

## (undated) DEVICE — WIPES FOLEY CARE SURESTEP PROVON DFC100

## (undated) DEVICE — SU VICRYL 0 UR-6 27" J603H

## (undated) DEVICE — JELLY LUBRICATING SURGILUBE 2OZ TUBE

## (undated) DEVICE — ENDO TROCAR FIRST ENTRY KII FIOS ADV FIX 12X100MM CFF73

## (undated) DEVICE — PREP POVIDONE IODINE SOLUTION 10% 4OZ

## (undated) DEVICE — SOL NACL 0.9% INJ 1000ML BAG 2B1324X

## (undated) DEVICE — SOL ADH LIQUID BENZOIN SWAB 0.6ML C1544

## (undated) DEVICE — DRSG PRIMAPORE 02X3" 7133

## (undated) DEVICE — KIT PATIENT POSITIONING PIGAZZI LATEX FREE 40580

## (undated) DEVICE — SOL NACL 0.9% IRRIG 1000ML BOTTLE 2F7124

## (undated) DEVICE — PAD PERI INDIV WRAP 11" 2022A

## (undated) DEVICE — DRAPE SHEET MED 44X70" 9355

## (undated) DEVICE — RETR RING LONE STAR 28.3X18.3CM W/CATH CLIPSX2 3308G

## (undated) DEVICE — ENDO POUCH UNIV RETRIEVAL SYSTEM INZII 10MM CD001

## (undated) DEVICE — LABEL MEDICATION SYSTEM 3303-P

## (undated) RX ORDER — OXYCODONE HYDROCHLORIDE 5 MG/1
TABLET ORAL
Status: DISPENSED
Start: 2020-09-15

## (undated) RX ORDER — FENTANYL CITRATE 50 UG/ML
INJECTION, SOLUTION INTRAMUSCULAR; INTRAVENOUS
Status: DISPENSED
Start: 2020-09-15

## (undated) RX ORDER — ACETAMINOPHEN 325 MG/1
TABLET ORAL
Status: DISPENSED
Start: 2020-09-15

## (undated) RX ORDER — CEFAZOLIN SODIUM 2 G/100ML
INJECTION, SOLUTION INTRAVENOUS
Status: DISPENSED
Start: 2020-09-15

## (undated) RX ORDER — BUPIVACAINE HYDROCHLORIDE AND EPINEPHRINE 2.5; 5 MG/ML; UG/ML
INJECTION, SOLUTION EPIDURAL; INFILTRATION; INTRACAUDAL; PERINEURAL
Status: DISPENSED
Start: 2020-09-15

## (undated) RX ORDER — LIDOCAINE HYDROCHLORIDE 20 MG/ML
JELLY TOPICAL
Status: DISPENSED
Start: 2024-06-14